# Patient Record
Sex: FEMALE | Race: BLACK OR AFRICAN AMERICAN | NOT HISPANIC OR LATINO | Employment: PART TIME | URBAN - METROPOLITAN AREA
[De-identification: names, ages, dates, MRNs, and addresses within clinical notes are randomized per-mention and may not be internally consistent; named-entity substitution may affect disease eponyms.]

---

## 2019-07-19 ENCOUNTER — TELEPHONE (OUTPATIENT)
Dept: TRANSPLANT | Facility: CLINIC | Age: 59
End: 2019-07-19

## 2019-07-22 ENCOUNTER — TELEPHONE (OUTPATIENT)
Dept: TRANSPLANT | Facility: CLINIC | Age: 59
End: 2019-07-22

## 2019-07-22 DIAGNOSIS — J84.9 ILD (INTERSTITIAL LUNG DISEASE): Primary | ICD-10-CM

## 2019-07-22 DIAGNOSIS — Z76.82 LUNG TRANSPLANT CANDIDATE: ICD-10-CM

## 2019-07-22 NOTE — TELEPHONE ENCOUNTER
----- Message from Byron Wolf MD sent at 7/22/2019  8:45 AM CDT -----  Consult  ----- Message -----  From: Nancy Walls RN  Sent: 7/19/2019  11:59 AM  To: Byron Wolf MD    Referral from Dr. Mewada at [facility].  58yo female with Lupus ILD, BMI 37.     PFT 11/2018:   FVC     1.56  FEV1    1.29  DLCO   14.38    6MWT 6/2019:  227m    Consult or defer due to weight?

## 2019-07-22 NOTE — TELEPHONE ENCOUNTER
Called patient to schedule the lung transplant consult appointments.  Received no answer but left a voice message asking her to call our office at her earliest convenience.

## 2019-07-23 NOTE — TELEPHONE ENCOUNTER
Returned call to patient but received no answer.  Left a message stating she can ask the  to put her through to me directly when she calls back so we can discuss her lung transplant consult appointment.

## 2019-07-23 NOTE — TELEPHONE ENCOUNTER
----- Message from Jonathan Bustillos sent at 7/23/2019 11:10 AM CDT -----  Contact: patient   Please call pt at [phone]    Patient would like to the status of her future lung appts    Thank you

## 2019-07-23 NOTE — TELEPHONE ENCOUNTER
Patient returned my call.  When asked, patient verbalized her awareness that Dr. Mewada referred her to [facility] for a lung transplant consult.  Explained that this initial visit with a transplant pulmonologist will be the time during which a provider determines if lung transplantation is an appropriate treatment option for her and gives her some education about the transplant process.  Added that PFTs and a 6 MWT will be done prior to the clinic visit with the provider.  Discussed available dates for these appointments and the patient asked to be scheduled on Tuesday, July 30, 2019 in the morning.  Informed patient that our  will call her back later today to update her about the exact times for her appointments on July 30.  Asked patient to call our office should she need to change these appointments.  She denied having any questions and verbalized her understanding of all information discussed.

## 2019-07-30 ENCOUNTER — HOSPITAL ENCOUNTER (OUTPATIENT)
Dept: PULMONOLOGY | Facility: CLINIC | Age: 59
Discharge: HOME | End: 2019-07-30
Payer: MEDICARE

## 2019-07-30 ENCOUNTER — INITIAL CONSULT (OUTPATIENT)
Dept: TRANSPLANT | Facility: CLINIC | Age: 59
End: 2019-07-30
Payer: MEDICARE

## 2019-07-30 VITALS
HEART RATE: 71 BPM | SYSTOLIC BLOOD PRESSURE: 136 MMHG | OXYGEN SATURATION: 98 % | DIASTOLIC BLOOD PRESSURE: 76 MMHG | HEIGHT: 69 IN | WEIGHT: 258.81 LBS | RESPIRATION RATE: 14 BRPM | BODY MASS INDEX: 38.33 KG/M2 | TEMPERATURE: 98 F

## 2019-07-30 VITALS — WEIGHT: 258.81 LBS | BODY MASS INDEX: 38.33 KG/M2 | HEIGHT: 69 IN

## 2019-07-30 DIAGNOSIS — Z76.82 LUNG TRANSPLANT CANDIDATE: ICD-10-CM

## 2019-07-30 DIAGNOSIS — J84.9 ILD (INTERSTITIAL LUNG DISEASE): ICD-10-CM

## 2019-07-30 DIAGNOSIS — M32.13 SYSTEMIC LUPUS ERYTHEMATOSUS WITH LUNG INVOLVEMENT, UNSPECIFIED SLE TYPE: ICD-10-CM

## 2019-07-30 DIAGNOSIS — Z76.82 LUNG TRANSPLANT CANDIDATE: Primary | ICD-10-CM

## 2019-07-30 DIAGNOSIS — E66.9 OBESITY (BMI 30-39.9): ICD-10-CM

## 2019-07-30 LAB
PRE FEV1 FVC: 78
PRE FEV1: 1.24
PRE FVC: 1.6
PREDICTED FEV1 FVC: 78
PREDICTED FEV1: 2.85
PREDICTED FVC: 3.6

## 2019-07-30 PROCEDURE — 99204 PR OFFICE/OUTPT VISIT, NEW, LEVL IV, 45-59 MIN: ICD-10-PCS | Mod: S$PBB,TXP,, | Performed by: INTERNAL MEDICINE

## 2019-07-30 PROCEDURE — 94618 PULMONARY STRESS TESTING: CPT | Mod: 26,S$PBB,TXP, | Performed by: INTERNAL MEDICINE

## 2019-07-30 PROCEDURE — 94010 BREATHING CAPACITY TEST: ICD-10-PCS | Mod: 26,S$PBB,TXP, | Performed by: INTERNAL MEDICINE

## 2019-07-30 PROCEDURE — 94010 BREATHING CAPACITY TEST: CPT | Mod: 26,S$PBB,TXP, | Performed by: INTERNAL MEDICINE

## 2019-07-30 PROCEDURE — 94727 PR PULM FUNCTION TEST BY GAS: ICD-10-PCS | Mod: 26,S$PBB,TXP, | Performed by: INTERNAL MEDICINE

## 2019-07-30 PROCEDURE — 94729 DIFFUSING CAPACITY: CPT | Mod: 26,S$PBB,TXP, | Performed by: INTERNAL MEDICINE

## 2019-07-30 PROCEDURE — 94010 BREATHING CAPACITY TEST: CPT | Mod: PBBFAC,TXP | Performed by: INTERNAL MEDICINE

## 2019-07-30 PROCEDURE — 94729 DIFFUSING CAPACITY: CPT | Mod: PBBFAC,TXP | Performed by: INTERNAL MEDICINE

## 2019-07-30 PROCEDURE — 94618 PULMONARY STRESS TESTING: ICD-10-PCS | Mod: 26,S$PBB,TXP, | Performed by: INTERNAL MEDICINE

## 2019-07-30 PROCEDURE — 99999 PR PBB SHADOW E&M-EST. PATIENT-LVL III: CPT | Mod: PBBFAC,TXP,, | Performed by: INTERNAL MEDICINE

## 2019-07-30 PROCEDURE — 99204 OFFICE O/P NEW MOD 45 MIN: CPT | Mod: S$PBB,TXP,, | Performed by: INTERNAL MEDICINE

## 2019-07-30 PROCEDURE — 94727 GAS DIL/WSHOT DETER LNG VOL: CPT | Mod: PBBFAC,TXP | Performed by: INTERNAL MEDICINE

## 2019-07-30 PROCEDURE — 99999 PR PBB SHADOW E&M-EST. PATIENT-LVL III: ICD-10-PCS | Mod: PBBFAC,TXP,, | Performed by: INTERNAL MEDICINE

## 2019-07-30 PROCEDURE — 94618 PULMONARY STRESS TESTING: CPT | Mod: PBBFAC,TXP | Performed by: INTERNAL MEDICINE

## 2019-07-30 PROCEDURE — 99213 OFFICE O/P EST LOW 20 MIN: CPT | Mod: PBBFAC,TXP,25 | Performed by: INTERNAL MEDICINE

## 2019-07-30 PROCEDURE — 94729 PR C02/MEMBANE DIFFUSE CAPACITY: ICD-10-PCS | Mod: 26,S$PBB,TXP, | Performed by: INTERNAL MEDICINE

## 2019-07-30 PROCEDURE — 94727 GAS DIL/WSHOT DETER LNG VOL: CPT | Mod: 26,S$PBB,TXP, | Performed by: INTERNAL MEDICINE

## 2019-07-30 RX ORDER — HYDROXYCHLOROQUINE SULFATE 200 MG/1
200 TABLET, FILM COATED ORAL 2 TIMES DAILY
COMMUNITY
Start: 2019-06-13

## 2019-07-30 RX ORDER — LISINOPRIL 20 MG/1
20 TABLET ORAL DAILY
COMMUNITY
End: 2023-07-07

## 2019-07-30 RX ORDER — MYCOPHENOLATE MOFETIL 500 MG/1
1500 TABLET ORAL 2 TIMES DAILY
COMMUNITY
Start: 2019-06-13 | End: 2023-07-07

## 2019-07-30 RX ORDER — GABAPENTIN 300 MG/1
600 CAPSULE ORAL 2 TIMES DAILY
COMMUNITY
Start: 2019-06-13

## 2019-07-30 RX ORDER — DICYCLOMINE HYDROCHLORIDE 20 MG/1
20 TABLET ORAL EVERY 6 HOURS PRN
COMMUNITY
Start: 2019-07-22 | End: 2019-08-21

## 2019-07-30 NOTE — PROCEDURES
Katy Vega is a 59 y.o.  female patient, who presents for a 6 minute walk test ordered by Braden Wolf MD.  The diagnosis is Pre Lung Transplant Evaluation; Interstitial Lung Disease.  The patient's BMI is 38.3 kg/m2.  Predicted distance (lower limit of normal) is 300.87 meters.      Test Results:    The test was completed without stopping.  The total time walked was 360 seconds.  During walking, the patient reported:  Lightheadedness, Dyspnea(back pain).  The patient used no assistive devices during testing.     07/30/2019---------Distance: 389.23 meters (1277 feet)     O2 Sat % Supplemental Oxygen Heart Rate Blood Pressure Tee Scale   Pre-exercise  (Resting) 99 % Room Air 81 bpm 144/67 mmHg 4   During Exercise 90 % Room Air 79 bpm 161/64 mmHg 7-8   Post-exercise  (Recovery) 98 % Room Air  68 bpm 150/67 mmHg      Recovery Time:  137 seconds    Performing nurse/tech:  John GUEVARA      PREVIOUS STUDY:   The patient has not had a previous study.      CLINICAL INTERPRETATION:  Six minute walk distance is 389.23 meters (1277 feet) with very heavy dyspnea.  During exercise, there was significant desaturation while breathing room air.  Both blood pressure and heart rate remained stable with walking.  The patient reported non-pulmonary symptoms during exercise.  No previous study performed.  Based upon age and body mass index, exercise capacity is normal.

## 2019-07-30 NOTE — PROGRESS NOTES
LUNG TRANSPLANT INITIAL EVALUATION                                                                                                                                           Reason for Visit:  Evaluation for lung transplant    Referring Physician: Mewada, Nishith M., MD    History of Present Illness: Katy Vega is a 59 y.o. female who is on 0L of oxygen.  She is on no assisted ventilation.  Her New York Heart Association Class is III and a Karnofsky score of 70% - Cares for self: Unable to carry on normal activity or active work. She is not diabetic.  She presents today for initial lung transplant evaluation for a diagnosis of ILD.    She states that she has noticed LEPE for years starting in 1602-7854.  At that time, she also noticed increasing fatigue with daily activities.  She was first evaluated at [facility] in 2006 when she evacuated after [place].  She states at that time she was diagnosed with COPD and ILD.  Further work-up showed positive autoimmune serologies and she was diagnosed with Lupus.  She was started on Plaquenil and Prednisone.  She then had intermittent follow-up until she moved back to [place] in 2012.  At that time, she established with Rheumatology at  and was again started on Plaquenil and Prednisone which she remained on until a few months ago.  She then began following with [facility] at [facility].  CT chest showed evidence of GGOs and fibrosis which have been stable on imaging.  They noticed a decrease in her FVC and referred her to [facility] Rheumatology who have since stopped the Prednisone, continued her on Plaquenil, and added MMF.  She has been unable to tolerate more than 1500mg per day due to GI side effects.  Since starting Prednisone years ago, she noticed an increased weight gain of approximately 80-90 lbs.  She has lost 15lbs since stopping.  Remains with LEPE but attempts to go to the gym and rides the stationary bike.  She does not require supplemental oxygen  and does not have evidence of PH.  She currently takes Symbicort and prn Albuterol for her COPD.  Does not have exacerbations or require outpatient steroids/abx.    Other medical history is significant for a ruptured aneurysm leading to a subdural hematoma and need for neurosurgical intervention.  Due to that, she feels like she has some difficulty with memory and balance.  She developed seizures following the CVA and takes Dilantin for seizure control.  She has not had a seizure in over 3 years.  She denies any known cardiac disease.  Has never had trauma to her chest, pneumothoraces, or chest surgeries.      She is currently on workers comp following a fall at work which led to a back injury requiring surgery and pain management.  She previously worked as a .  She is  with her  dying from lung cancer in .  Her 2 brothers and father also  of lung cancer and she has had 2 sisters die of breast cancer.  She is UTD on mammograms and had a right axillary LN biopsy last year which was negative.  She is a never smoker but has extensive second hand exposure.  She does not drink or use illicits.      Past Medical History:   Diagnosis Date    Aneurysm     Arthritis     COPD (chronic obstructive pulmonary disease)     Hypertension     Lupus     Raynaud disease     Seizures     Stroke 2012       Past Surgical History:   Procedure Laterality Date    NERVE SURGERY      PARTIAL HYSTERECTOMY         Allergies: Patient has no known allergies.    Current Outpatient Medications   Medication Sig    ALBUTEROL SULFATE (PROVENTIL HFA INHL) Inhale into the lungs.    amlodipine (NORVASC) 10 MG tablet Take 10 mg by mouth once daily.    budesonide-formoterol 160-4.5 mcg (SYMBICORT) 160-4.5 mcg/actuation HFAA Inhale 2 puffs into the lungs every 12 (twelve) hours.    dicyclomine (BENTYL) 20 mg tablet Take 20 mg by mouth every 6 (six) hours as needed.    duloxetine (CYMBALTA) 60 MG capsule  Take 60 mg by mouth every 7 days.     gabapentin (NEURONTIN) 300 MG capsule Take 600 mg by mouth 2 (two) times daily.    hydrocodone-acetaminophen 10-325mg (NORCO)  mg Tab Take by mouth.    hydroxychloroquine (PLAQUENIL) 200 mg tablet Take 200 mg by mouth 2 (two) times daily.    lisinopril (PRINIVIL,ZESTRIL) 20 MG tablet Take 20 mg by mouth once daily.    mometasone (NASONEX) 50 mcg/actuation nasal spray 2 sprays by Nasal route once daily.    mycophenolate (CELLCEPT) 500 mg Tab Take 1,500 mg by mouth 2 (two) times daily.    naproxen (NAPROSYN) 500 MG tablet Take 500 mg by mouth 2 (two) times daily.    phenytoin (DILANTIN) 100 MG ER capsule Take by mouth 3 (three) times daily.     No current facility-administered medications for this visit.          There is no immunization history on file for this patient.  Family History:    Family History   Problem Relation Age of Onset    Lupus Sister     Lung cancer Brother     Breast cancer Sister     Breast cancer Sister     Lung cancer Brother      Social History     Substance and Sexual Activity   Alcohol Use Yes    Comment: very occasional      Social History     Substance and Sexual Activity   Drug Use Never      Social History     Socioeconomic History    Marital status:      Spouse name: Not on file    Number of children: Not on file    Years of education: Not on file    Highest education level: Not on file   Occupational History    Not on file   Social Needs    Financial resource strain: Not on file    Food insecurity:     Worry: Not on file     Inability: Not on file    Transportation needs:     Medical: Not on file     Non-medical: Not on file   Tobacco Use    Smoking status: Never Smoker    Smokeless tobacco: Never Used   Substance and Sexual Activity    Alcohol use: Yes     Comment: very occasional    Drug use: Never    Sexual activity: Not on file   Lifestyle    Physical activity:     Days per week: Not on file     Minutes  per session: Not on file    Stress: Not on file   Relationships    Social connections:     Talks on phone: Not on file     Gets together: Not on file     Attends [employer] service: Not on file     Active member of club or organization: Not on file     Attends meetings of clubs or organizations: Not on file     Relationship status: Not on file   Other Topics Concern    Not on file   Social History Narrative    Not on file     Review of Systems   Constitutional: Positive for malaise/fatigue. Negative for chills, diaphoresis, fever and weight loss.   HENT: Negative for congestion, ear discharge, ear pain, hearing loss, nosebleeds, sinus pain, sore throat and tinnitus.    Eyes: Negative for blurred vision, double vision, photophobia, pain, discharge and redness.   Respiratory: Positive for shortness of breath. Negative for cough, hemoptysis, sputum production, wheezing and stridor.    Cardiovascular: Positive for leg swelling. Negative for chest pain, palpitations, orthopnea, claudication and PND.   Gastrointestinal: Negative for abdominal pain, blood in stool, constipation, diarrhea, heartburn, melena, nausea and vomiting.   Genitourinary: Negative for dysuria, flank pain, frequency, hematuria and urgency.   Musculoskeletal: Positive for back pain. Negative for falls, joint pain, myalgias and neck pain.   Skin: Negative for itching and rash.   Neurological: Positive for seizures. Negative for dizziness, tingling, tremors, sensory change, speech change, focal weakness, loss of consciousness, weakness and headaches.   Endo/Heme/Allergies: Negative for environmental allergies and polydipsia. Does not bruise/bleed easily.   Psychiatric/Behavioral: Positive for memory loss. Negative for depression, hallucinations, substance abuse and suicidal ideas. The patient is not nervous/anxious and does not have insomnia.      Vitals  /76 (BP Location: Right arm, Patient Position: Sitting, BP Method: Large (Automatic))    "Pulse 71   Temp 97.7 °F (36.5 °C) (Oral)   Resp 14   Ht 5' 9" (1.753 m)   Wt 117.4 kg (258 lb 13.1 oz)   LMP  (LMP Unknown)   SpO2 98% Comment: room air  BMI 38.22 kg/m²   Physical Exam   Constitutional: She is oriented to person, place, and time and well-developed, well-nourished, and in no distress. No distress.   HENT:   Head: Normocephalic and atraumatic.   Nose: Nose normal.   Mouth/Throat: Oropharynx is clear and moist. No oropharyngeal exudate.   Eyes: Pupils are equal, round, and reactive to light. Conjunctivae and EOM are normal. Right eye exhibits no discharge. Left eye exhibits no discharge. No scleral icterus.   Neck: Normal range of motion. Neck supple. No JVD present. No tracheal deviation present. No thyromegaly present.   Cardiovascular: Normal rate, regular rhythm, normal heart sounds and intact distal pulses. Exam reveals no gallop and no friction rub.   No murmur heard.  Pulmonary/Chest: Effort normal and breath sounds normal. No stridor. No respiratory distress. She has no wheezes. She has no rales. She exhibits no tenderness.   Abdominal: Bowel sounds are normal. She exhibits no distension. There is no tenderness. There is no guarding.   Musculoskeletal: Normal range of motion. She exhibits no edema, tenderness or deformity.   Lymphadenopathy:     She has no cervical adenopathy.   Neurological: She is oriented to person, place, and time. No cranial nerve deficit. Gait normal. Coordination normal. GCS score is 15.   Skin: Skin is dry. No rash noted. She is not diaphoretic. No erythema. No pallor.   Psychiatric: Mood and affect normal.       Labs:  Lab Visit on 07/30/2019   Component Date Value    Alcohol, Urine 07/30/2019 <10     Benzodiazepines 07/30/2019 Negative     Methadone metabolites 07/30/2019 Negative     Cocaine (Metab.) 07/30/2019 Negative     Opiate Scrn, Ur 07/30/2019 Negative     Barbiturate Screen, Ur 07/30/2019 Negative     Amphetamine Screen, Ur 07/30/2019 Negative  "    THC 07/30/2019 Negative     Phencyclidine 07/30/2019 Negative     Creatinine, Random Ur 07/30/2019 27.0     Toxicology Information 07/30/2019 SEE COMMENT        Pulmonary Function Tests 7/30/2019   FVC 1.6   FEV1 1.24   TLC (liters) 2.54   DLCO (ml/mmHg sec) 14.6   FVC% 45   FEV1% 44   TLC% 43   RV 1.03   RV% 46   DLCO% 71     6MW 7/30/2019   6MWT Status completed without stopping   Patient Reported Lightheadedness;Dyspnea   Was O2 used? No   6MW Distance walked (feet) 1277   Distance walked (meters) 389.23   Did patient stop? No   Oxygen Saturation 99   Supplemental Oxygen Room Air   Heart Rate 81   Blood Pressure 144/67   Tee Dyspnea Rating  somewhat heavy   Oxygen Saturation 90   Supplemental Oxygen Room Air   Heart Rate 79   Blood Pressure 161/64   Tee Dyspnea Rating  very heavy   Recovery Time (seconds) 137   Oxygen Saturation 98   Supplemental Oxygen Room Air   Heart Rate 68     Cardiodiagnostics:  TTE 3/26/2019 from [facility]: normal LVEF and RVEF.  ePAP normal.    Assessment:  1. Lung transplant candidate    2. ILD (interstitial lung disease)    3. Systemic lupus erythematosus with lung involvement, unspecified SLE type    4. Obesity (BMI 30-39.9)      Plan:   1. Currently followed by [facility] at [facility] for ILD and possible COPD.  No imaging available to review.  PFTs today show severe restriction with mild decrease in DLCO.  While radiology reports clearly document ILD from CT chest, her degree of restriction seems out of proportion to the decrease in DLCO suggesting that her obesity is playing a role.  Agree with referral to rheumatology and their current therapy with Plaquenil and MMF.  Trends in her FVC and DLCO will need to be followed closely at 6 month intervals and therapy escalated for continued decline.  Can continue with bronchodilators for reported history of COPD.  Strongly recommend referral to pulmonary rehab for strength and conditioning.      2. Currently followed by [facility] Rheumatology  for SLE.  Symptoms appear controlled.  Continue with Plaquenil and MMF.    3. Had a long discussion regarding her weight and importance of diet and exercise.  Recommend nutrition consult if available at [facility] and pulmonary rehab.  Patient very receptive and eager to start.      4. With regards to her lung transplant candidacy for a diagnosis of ILD, she currently does not meet disease specific indications for lung transplant but thank your for the early referral given the nature of her disease.  I had a long discussion with her about lung transplant and decisions about timing of work-up/listing.  Explained that ILD related to SLE can be very responsive to immunosuppression and progression can be slow with aggressive therapy.  Explained the importance of continued exercise for strength and conditioning.  I also suspect that she will have improvement in her symptoms with weight loss alone and she is very motivated to lose a significant amount of weight.  We discussed that should she need a transplant in the future, her current barrier is her obesity.  She will need to lose approximately 40lbs to drop her BMI below 34 before being considered.  She verbalized understanding and all questions were answered.      5. Follow-up in 1 year.    Thank you for the consult and allowing me to participate in the care of this patient.  Please feel free to contact me directly with any questions.        Rosa Maria Li D.O.  Lung Transplant  Pulmonary/Critical Care Medicine

## 2019-07-30 NOTE — LETTER
July 30, 2019        Nishith M. Mewada  [address]  [facility] [address]  Phone: [phone]  Fax: [phone]             [facility] - Lung Transplant  [address]  [facility] [address]  Phone: [phone]   Patient: Katy Vega   MR Number: [id number]   YOB: 1960   Date of Visit: 7/30/2019       Dear Dr. Nishith M. Mewada    Thank you for referring Katy Vega to me for evaluation. Attached you will find relevant portions of my assessment and plan of care.    If you have questions, please do not hesitate to call me. I look forward to following Katy Vega along with you.    Sincerely,    Rosa Maria Li, DO    Enclosure    If you would like to receive this communication electronically, please contact [email or web address] or [phone] to request [employer] access.    [employer] is a tool which provides read-only access to select patient information with whom you have a relationship. Its easy to use and provides real time access to review your patients record including encounter summaries, notes, results, and demographic information.    If you feel you have received this communication in error or would no longer like to receive these types of communications, please e-mail [email or web address]

## 2020-04-23 ENCOUNTER — OFFICE VISIT (OUTPATIENT)
Dept: URGENT CARE | Facility: CLINIC | Age: 60
End: 2020-04-23
Payer: MEDICARE

## 2020-04-23 VITALS
WEIGHT: 258 LBS | BODY MASS INDEX: 38.21 KG/M2 | HEART RATE: 68 BPM | TEMPERATURE: 97 F | OXYGEN SATURATION: 96 % | HEIGHT: 69 IN | RESPIRATION RATE: 16 BRPM

## 2020-04-23 DIAGNOSIS — M54.30 BACK PAIN WITH SCIATICA: ICD-10-CM

## 2020-04-23 DIAGNOSIS — M54.9 BACK PAIN, UNSPECIFIED BACK LOCATION, UNSPECIFIED BACK PAIN LATERALITY, UNSPECIFIED CHRONICITY: Primary | ICD-10-CM

## 2020-04-23 DIAGNOSIS — M54.9 BACK PAIN WITH SCIATICA: ICD-10-CM

## 2020-04-23 PROCEDURE — 72100 X-RAY EXAM L-S SPINE 2/3 VWS: CPT | Mod: FY,S$GLB,TXP, | Performed by: RADIOLOGY

## 2020-04-23 PROCEDURE — 72100 XR LUMBAR SPINE 2 OR 3 VIEWS: ICD-10-PCS | Mod: FY,S$GLB,TXP, | Performed by: RADIOLOGY

## 2020-04-23 PROCEDURE — 96372 THER/PROPH/DIAG INJ SC/IM: CPT | Mod: S$GLB,TXP,, | Performed by: INTERNAL MEDICINE

## 2020-04-23 PROCEDURE — 99214 OFFICE O/P EST MOD 30 MIN: CPT | Mod: S$GLB,TXP,, | Performed by: INTERNAL MEDICINE

## 2020-04-23 PROCEDURE — 96372 PR INJECTION,THERAP/PROPH/DIAG2ST, IM OR SUBCUT: ICD-10-PCS | Mod: S$GLB,TXP,, | Performed by: INTERNAL MEDICINE

## 2020-04-23 PROCEDURE — 99214 PR OFFICE/OUTPT VISIT, EST, LEVL IV, 30-39 MIN: ICD-10-PCS | Mod: S$GLB,TXP,, | Performed by: INTERNAL MEDICINE

## 2020-04-23 RX ORDER — KETOROLAC TROMETHAMINE 30 MG/ML
30 INJECTION, SOLUTION INTRAMUSCULAR; INTRAVENOUS
Status: DISCONTINUED | OUTPATIENT
Start: 2020-04-23 | End: 2020-04-23

## 2020-04-23 RX ORDER — KETOROLAC TROMETHAMINE 30 MG/ML
30 INJECTION, SOLUTION INTRAMUSCULAR; INTRAVENOUS
Status: COMPLETED | OUTPATIENT
Start: 2020-04-23 | End: 2020-04-23

## 2020-04-23 RX ADMIN — KETOROLAC TROMETHAMINE 30 MG: 30 INJECTION, SOLUTION INTRAMUSCULAR; INTRAVENOUS at 07:04

## 2020-04-23 NOTE — PATIENT INSTRUCTIONS
Sciatica    Sciatica is a condition that causes pain in the lower back that spreads down into the buttock, hip, and leg. Sometimes the leg pain can happen without any back pain. Sciatica happens when a spinal nerve is irritated or has pressure put on it as comes out of the spinal canal in the lower back. This most often happens when a bulge or rupture of a nearby spinal disk presses on the nerve. Sciatica can also be caused by a narrowing of the spinal canal (spinal stenosis) or spasm of the muscle in the buttocks that the sciatic nerve passes through (pyriform muscle). Sciatica is also called lumbar radiculopathy.  Sciatica may begin after a sudden twisting or bending force, such as in a car accident. Or it can happen after a simple awkward movement. In either case, muscle spasm often also happens. Muscle spasm makes the pain worse.  A healthcare provider makes a diagnosis of sciatica from your symptoms and a physical exam. Unless you had an injury from a car accident or fall, you usually wont have X-rays taken at this time. This is because the nerves and disks in your back cant be seen on an X-ray. If the provider sees signs of a compressed nerve, you will need to schedule an MRI scan as an outpatient. Signs of a compressed nerve include loss of strength in a leg.  Most sciatica gets better with medicine, exercise, and physical therapy. If your symptoms continue after at least 3 months of medical treatment, you may need surgery or injections to your lower back.  Home care  Follow these tips when caring for yourself at home:  · You may need to stay in bed the first few days. But as soon as possible, begin sitting up or walking. This will help you avoid problems that come from staying in bed for long periods.  · When in bed, try to find a position that is comfortable. A firm mattress is best. Try lying flat on your back with pillows under your knees. You can also try lying on your side with your knees bent up  toward your chest and a pillow between your knees.  · Avoid sitting for long periods. This puts more stress on your lower back than standing or walking.  · Use heat from a hot shower, hot bath, or heating pad to help ease pain. Massage can also help. You can also try using an ice pack. You can make your own ice pack by putting ice cubes in a plastic bag. Wrap the bag in a thin towel. Try both heat and cold to see which works best. Use the method that feels best for 20 minutes several times a day.  · You may use acetaminophen to ease pain, unless another pain medicine was prescribed. Note: If you have chronic liver or kidney disease, talk with your healthcare provider before taking these medicines. Also talk with your provider if youve had a stomach ulcer or gastrointestinal bleeding.  · Use safe lifting methods. Dont lift anything heavier than 15 pounds until all of the pain is gone.  Follow-up care  Follow up with your healthcare provider, or as advised. You may need physical therapy or additional tests.  If X-rays were taken, a radiologist will look at them. You will be told of any new findings that may affect your care.  When to seek medical advice  Call your healthcare provider right away if any of these occur:  · Pain gets worse even after taking prescribed medicine  · Weakness or numbness in 1 or both legs or hips  · Numbness in your groin or genital area  · You cant control your bowel or bladder  · Fever  · Redness or swelling over your back or spine   Date Last Reviewed: 8/1/2016  © 4764-7601 [employer]. [address] [place] [address], [place] [address]. All rights reserved. This information is not intended as a substitute for professional medical care. Always follow your healthcare professional's instructions.        Understanding Lumbar Radiculopathy    Lumbar radiculopathy is irritation or inflammation of a nerve root in the low back. It causes symptoms that spread out from the back down one or  both legs. To understand this condition, it helps to understand the parts of the spine:  · Vertebrae. These are bones that stack to form the spine. The lumbar spine contains the 5 bottom vertebrae.  · Disks. These are soft pads of tissue between the vertebrae. They act as shock absorbers for the spine.  · Spinal canal. This is a tunnel formed within the stacked vertebrae. In the lumbar spine, nerves run through this canal.  · Nerves. These branch off and leave the spinal canal, traveling out to parts of the body. As they leave the spinal canal, nerves pass through openings between the vertebrae. The nerve root is the part of the nerve that is closest to the spinal canal.  · Sciatic nerve. This is a large nerve formed from several nerve roots in the low back. This nerve extends down the back of the leg to the foot.  With lumbar radiculopathy, nerve roots in the low back become irritated. This leads to pain and symptoms. The sciatic nerve is commonly involved, so the condition is often called sciatica.  What causes lumbar radiculopathy?  Aging, injury, poor posture, extra body weight, and other issues can lead to problems in the low back. These problems may then irritate nerve roots. They include:  · Damage to a disk in the lumbar spine. The damaged disk may then press on nearby nerve roots.  · Degeneration from wear and tear, and aging. This can lead to narrowing (stenosis) of the openings between the vertebrae. The narrowed openings press on nerve roots as they leave the spinal canal.  · Unstable spine. This is when a vertebra slips forward. It can then press on a nerve root.  Other, less common things can put pressure on nerves in the low back. These include diabetes, infection, or a tumor.  Symptoms of lumbar radiculopathy  These include:  · Pain in the low back  · Pain, numbness, tingling, or weakness that travels into the buttocks, hip, groin, or leg  · Muscle spasms  Treatment for lumbar radiculopathy  In most  cases, your healthcare provider will first try treatments that help relieve symptoms. These may include:  · Prescription and over-the-counter pain medicines. These help relieve pain, swelling, and irritation.  · Limits on positions and activities that increase pain. But lying in bed or avoiding all movement is only recommended for a short period of time.  · Physical therapy, including exercises and stretches. This helps decrease pain and increase movement and function.  · Steroid shots into the lower back. This may help relieve symptoms for a time.  · Weight-loss program. If you are overweight, losing extra pounds may help relieve symptoms.  In some cases, you may need surgery to fix the underlying problem. This depends on the cause, the symptoms, and how long the pain has lasted.  Possible complications  Over time, an irritated and inflamed nerve may become damaged. This may lead to long-lasting (permanent) numbness or weakness in your legs and feet. If symptoms change suddenly or get worse, be sure to let your healthcare provider know.  When to call your healthcare provider  Call your healthcare provider right away if you have any of these:  · New pain or pain that gets worse  · New or increasing weakness, tingling, or numbness in your leg or foot  · Problems controlling your bladder or bowel   Date Last Reviewed: 3/10/2016  © 8022-6423 [employer]. [address] [place] [address], [place] [address]. All rights reserved. This information is not intended as a substitute for professional medical care. Always follow your healthcare professional's instructions.

## 2020-04-23 NOTE — PROGRESS NOTES
"Subjective:       Patient ID: Katy Vega is a 60 y.o. female.    Vitals:  height is 5' 9" (1.753 m) and weight is 117 kg (258 lb). Her tympanic temperature is 97.2 °F (36.2 °C). Her pulse is 68. Her respiration is 16 and oxygen saturation is 96%.     Chief Complaint: Leg Pain    Leg Pain    The incident occurred more than 1 week ago (x1 week). The incident occurred at home. There was no injury mechanism. The pain is present in the right leg. The quality of the pain is described as aching (weakness). The pain is at a severity of 9/10. The pain is moderate. The pain has been constant since onset. Associated symptoms include muscle weakness. She reports no foreign bodies present. The symptoms are aggravated by movement. Treatments tried: Rx pain meds on list. The treatment provided no relief.       Constitution: Negative for chills, fatigue and fever.   HENT: Negative for congestion and sore throat.    Neck: Negative for painful lymph nodes.   Cardiovascular: Negative for chest pain and leg swelling.   Eyes: Negative for double vision and blurred vision.   Respiratory: Negative for cough and shortness of breath.    Gastrointestinal: Negative for nausea, vomiting and diarrhea.   Genitourinary: Negative for dysuria, frequency, urgency and history of kidney stones.   Musculoskeletal: Positive for pain. Negative for joint pain, joint swelling, muscle cramps and muscle ache.        R LEG PAIN/WEAKNESS   Skin: Negative for color change, pale, rash and bruising.   Allergic/Immunologic: Negative for seasonal allergies.   Neurological: Negative for dizziness, history of vertigo, light-headedness, passing out and headaches.   Hematologic/Lymphatic: Negative for swollen lymph nodes.   Psychiatric/Behavioral: Negative for nervous/anxious, sleep disturbance and depression. The patient is not nervous/anxious.        Objective:      Physical Exam   Constitutional: She is oriented to person, place, and time. She appears " well-developed and well-nourished.   HENT:   Head: Normocephalic and atraumatic.   Eyes: Pupils are equal, round, and reactive to light. EOM are normal.   Cardiovascular: Normal rate and regular rhythm.   Pulmonary/Chest: Effort normal and breath sounds normal. No respiratory distress.   Musculoskeletal: She exhibits no edema.   Calf circumference 17cm bilaterally. No edema.    Neurological: She is alert and oriented to person, place, and time. She exhibits normal muscle tone. Coordination normal.   Sensation intact in bilateral lower extremities. Dorsiflexion of ankle intact bilaterally. Inversion and eversion of ankle normal. Gait with minor drop of right foot. Straight leg test of right reproduced pain in buttock with shooting pain down calf.    Skin: Capillary refill takes less than 2 seconds.         Assessment:       1. Back pain, unspecified back location, unspecified back pain laterality, unspecified chronicity    2. Back pain with sciatica        Plan:         Back pain, unspecified back location, unspecified back pain laterality, unspecified chronicity  -     X-Ray Lumbar Spine 2 Or 3 Views; Future; Expected date: 04/23/2020    Back pain with sciatica    Exam consistent with Lumbago with sciatica. Considered DVT in my differential given calf pain with active cancer, but leg circumference 17 cm bilaterally with no edema. Also no SOB or chest pain. Straight leg test positive. Slight foot drop noted with observing gait. Remainder of neuro exam reassuring. X ray with degenerative changes but no herniation. Conservative management with Toradol injection and continued use of gabapentin. Will message PCP to inform them of this visit and to take over mgmt including possible neurosurgery referral.

## 2020-04-24 ENCOUNTER — TELEPHONE (OUTPATIENT)
Dept: URGENT CARE | Facility: CLINIC | Age: 60
End: 2020-04-24

## 2020-04-24 NOTE — TELEPHONE ENCOUNTER
"Attempted to call and check in with patient. Phone rang once then stated "person unavailable". Unable to leave voicemail.   "

## 2020-04-30 ENCOUNTER — LAB VISIT (OUTPATIENT)
Dept: PRIMARY CARE CLINIC | Facility: CLINIC | Age: 60
End: 2020-04-30
Payer: MEDICARE

## 2020-04-30 DIAGNOSIS — R05.9 COUGH: Primary | ICD-10-CM

## 2020-04-30 PROCEDURE — U0002 COVID-19 LAB TEST NON-CDC: HCPCS | Mod: TXP

## 2020-05-01 ENCOUNTER — TELEPHONE (OUTPATIENT)
Dept: INTERNAL MEDICINE | Facility: CLINIC | Age: 60
End: 2020-05-01

## 2020-05-01 LAB — SARS-COV-2 RNA RESP QL NAA+PROBE: NOT DETECTED

## 2020-07-16 ENCOUNTER — TELEPHONE (OUTPATIENT)
Dept: TRANSPLANT | Facility: CLINIC | Age: 60
End: 2020-07-16

## 2020-07-16 DIAGNOSIS — Z01.812 PRE-PROCEDURE LAB EXAM: ICD-10-CM

## 2020-07-16 DIAGNOSIS — Z76.82 LUNG TRANSPLANT CANDIDATE: Primary | ICD-10-CM

## 2020-07-16 DIAGNOSIS — M32.13 SYSTEMIC LUPUS ERYTHEMATOSUS WITH LUNG INVOLVEMENT, UNSPECIFIED SLE TYPE: ICD-10-CM

## 2020-07-16 DIAGNOSIS — J84.9 ILD (INTERSTITIAL LUNG DISEASE): ICD-10-CM

## 2020-07-23 NOTE — TELEPHONE ENCOUNTER
Spoke with patient, verified she is undergoing treatment for breast cancer (will complete radiation post lumpectomy about 8/25). Patient is requesting to continue follow up with Dr. Li for pulmonary care. She is requesting to know how long will she need to wait to be considered for lung transplant. Patient is aware time varies depending on type of cancer and staging. Patient is requesting to be scheduled with Dr. Li once she completes radiation; she is requesting COVID testing at [facility]. Patient is requesting referral to bariatrics, she reports her weight is a problem and she wants to know if there is someone she can see. She is aware will ask Dr. Li if this is appropriate per transplant team.

## 2020-07-24 ENCOUNTER — TELEPHONE (OUTPATIENT)
Dept: TRANSPLANT | Facility: CLINIC | Age: 60
End: 2020-07-24

## 2020-07-24 DIAGNOSIS — E66.9 OBESITY (BMI 30-39.9): Primary | ICD-10-CM

## 2020-07-24 DIAGNOSIS — M32.13 SYSTEMIC LUPUS ERYTHEMATOSUS WITH LUNG INVOLVEMENT, UNSPECIFIED SLE TYPE: ICD-10-CM

## 2020-07-24 DIAGNOSIS — Z76.82 LUNG TRANSPLANT CANDIDATE: ICD-10-CM

## 2020-07-24 NOTE — TELEPHONE ENCOUNTER
----- Message from Rosa Maria Li DO sent at 7/24/2020  1:45 PM CDT -----  Regarding: RE: bariatric consult?  We can refer to bariatric medicine no problem.  Jbks  ----- Message -----  From: Bev Cedeño  Sent: 7/23/2020   3:45 PM CDT  To: Rosa Maria Li,   Subject: bariatric consult?                               This patient is requesting to continue follow up with you (as primary pulm) while she is unable to qualify for lung transplant. (Due to breast cancer / radiation - she is aware she will require oncology clearance and there will be an exclusion period).  She states she knows her weight is an issue as well, and that she would like to have a bariatrics consult if possible. Would you refer her or should I advise her to request this from another provider?     Thank you.

## 2020-07-30 ENCOUNTER — TELEPHONE (OUTPATIENT)
Dept: TRANSPLANT | Facility: CLINIC | Age: 60
End: 2020-07-30

## 2020-07-30 NOTE — TELEPHONE ENCOUNTER
Contacted patient about obtaining outside imaging from 2018- present (CT chest scans). She reports she will come by to sign a release of information next week when she comes to bariatric appt. Will leave at reception desk of transplant clinic. Patient verbalized understanding.

## 2020-08-07 ENCOUNTER — TELEPHONE (OUTPATIENT)
Dept: BARIATRICS | Facility: CLINIC | Age: 60
End: 2020-08-07

## 2020-08-07 ENCOUNTER — INITIAL CONSULT (OUTPATIENT)
Dept: BARIATRICS | Facility: CLINIC | Age: 60
End: 2020-08-07
Payer: MEDICARE

## 2020-08-07 VITALS
SYSTOLIC BLOOD PRESSURE: 122 MMHG | HEIGHT: 70 IN | HEART RATE: 85 BPM | DIASTOLIC BLOOD PRESSURE: 89 MMHG | OXYGEN SATURATION: 98 % | BODY MASS INDEX: 36.34 KG/M2 | WEIGHT: 253.81 LBS

## 2020-08-07 DIAGNOSIS — I10 HYPERTENSION, UNSPECIFIED TYPE: ICD-10-CM

## 2020-08-07 DIAGNOSIS — M32.13 SYSTEMIC LUPUS ERYTHEMATOSUS WITH LUNG INVOLVEMENT, UNSPECIFIED SLE TYPE: ICD-10-CM

## 2020-08-07 DIAGNOSIS — E66.01 CLASS 2 SEVERE OBESITY DUE TO EXCESS CALORIES WITH SERIOUS COMORBIDITY AND BODY MASS INDEX (BMI) OF 36.0 TO 36.9 IN ADULT: Primary | ICD-10-CM

## 2020-08-07 PROBLEM — E66.812 CLASS 2 SEVERE OBESITY DUE TO EXCESS CALORIES WITH SERIOUS COMORBIDITY AND BODY MASS INDEX (BMI) OF 36.0 TO 36.9 IN ADULT: Status: ACTIVE | Noted: 2020-08-07

## 2020-08-07 PROCEDURE — 99215 PR OFFICE/OUTPT VISIT, EST, LEVL V, 40-54 MIN: ICD-10-PCS | Mod: S$PBB,TXP,, | Performed by: STUDENT IN AN ORGANIZED HEALTH CARE EDUCATION/TRAINING PROGRAM

## 2020-08-07 PROCEDURE — 99999 PR PBB SHADOW E&M-EST. PATIENT-LVL V: ICD-10-PCS | Mod: PBBFAC,TXP,, | Performed by: STUDENT IN AN ORGANIZED HEALTH CARE EDUCATION/TRAINING PROGRAM

## 2020-08-07 PROCEDURE — 99215 OFFICE O/P EST HI 40 MIN: CPT | Mod: PBBFAC,NTX | Performed by: STUDENT IN AN ORGANIZED HEALTH CARE EDUCATION/TRAINING PROGRAM

## 2020-08-07 PROCEDURE — 99999 PR PBB SHADOW E&M-EST. PATIENT-LVL V: CPT | Mod: PBBFAC,TXP,, | Performed by: STUDENT IN AN ORGANIZED HEALTH CARE EDUCATION/TRAINING PROGRAM

## 2020-08-07 PROCEDURE — 99215 OFFICE O/P EST HI 40 MIN: CPT | Mod: S$PBB,TXP,, | Performed by: STUDENT IN AN ORGANIZED HEALTH CARE EDUCATION/TRAINING PROGRAM

## 2020-08-07 RX ORDER — SEMAGLUTIDE 1.34 MG/ML
0.25 INJECTION, SOLUTION SUBCUTANEOUS
Qty: 1.5 ML | Refills: 0 | Status: SHIPPED | OUTPATIENT
Start: 2020-08-07 | End: 2020-08-29

## 2020-08-07 NOTE — PATIENT INSTRUCTIONS
Ozempic 0.25 mg weekly injection.  Decrease portions as soon as you start Ozempic. Some nausea in the first 2 weeks is not unusual.   If you get pain across the upper abdomen and around to your back, please call the office.     Www.[email or web address] for savings card.    Obesity is a chronic disease that requires lifelong management.  While there are treatments for obesity, such as surgery, medication, and lifestyle changes, there is no cure for obesity.        Benefits seen with 5-10% weight loss:  - Reduction in risk of type 2 diabetes  - Reduction in cardiovascular mortality risk  - Improvements in cholesterol levels  - Improvements in blood pressure  - Improvements in severity of sleep apnea  - Improvemnts in quality of life      Weekly Weight:  Weigh yourself at least once a week to help keep track of your progress between visits. Tracking your weight will provide you with important feedback about the changes you are making. To measure your weight as accurately as possible, weigh yourself at the same time of day, wearing the same clothes, and using the same scale.       Food and Beverage Log:  Keep a log of all food and beverages that you consume.  Keeping track of calories will help you lose weight, because monitoring will help you become more aware of what you are eating and recognize your eating patterns.  Be mindful of your hunger level before eating and your satiety level after eating.  Just keeping records will help you make healthy changes in your diet and eat fewer calories.    Tips for keeping a calorie count:     ? Each day, aim for the daily calorie goal of 1200 calories.     ? Record your food intake immediately after eating. If possible, look up calories before or immediately after eating.     ? Download an justin like [employer] or Lose It! To keep track of your calories.    ? Measuring foods (using measuring cups or spoons) will help you be more accurate with counting calories.     ? Keep a running  calorie count throughout the day.     ? Count daily calories toward a weekly calorie total. If you eat too many calories one day, cut back slightly over the next few days to meet your weekly goal.      Meal Planning & Grocery Shopping    Meal planning builds the foundation for healthy eating. When you have structured ideas for healthy meals and foods available at home to prepare those meals, weight control becomes easier.  If only healthy foods are available at home, then you will be much more likely to eat healthy foods. And you will be less likely to go to a restaurant or  a fast food meal, which tend to be unhealthy and higher in calories than meals prepared at home.      Take 5-10 minutes each week to plan meals for the next 7 days.  Make a grocery list based on the meal plan.    Grocery Shopping Tips:  ? Shop on a full stomach.  ? Schedule your shopping for times when you are most motivated and able to be disciplined about your purchases. For example, after a stressful day at work it may be difficult to make the healthiest choices. Shopping at other times, such as early in the morning or after dinner, may be easier.  ? Focus your shopping on the outside aisles of the store, which tend to contain more fresh foods and lower calorie foods. The inside aisles tend to have more processed foods.  ? Stick to your list. Avoid buying unhealthy items just because they are on sale.   ? Compare nutrition labels to check the number of calories and percentage of fat.      What to buy:    Vegetables  - Fresh vegetables  - Frozen vegetables with no sauce or added salt  - Canned vegetables with no sauce or added salt    Protein  - Lean meats, such as chicken and turkey  - Limit red meats, such as beef to no more than 1x/week  - Limit processed meats, such as cold cuts, zavala, sausage, and hot dogs. Look for brands that have no nitrites and are minimally processed. Consider turkey sausage or turkey zavala.  - Fish and  Shellfish  - Eggs  - Dried beans  - Canned beans (reduced sodium)    Fat  - Use healthy oils, such as olive oil or canola oil, for cooking, salad dressings, etc.  - Unflavored nuts and seeds  - Nut butters (no added sugar)    Dairy  - Yogurt (no sugar added)  - Cheese  - Low-fat milk  - Unsweetened nondairy milks (almond milk, soy milk, etc)    Fruit  - Fresh Fruit  - Frozen fruit with no added sugar  - Canned fruit with no added sugar  - Dried fruit with no added sugar  - 100% fruit juice    Whole Grains  - Single ingredient grains, such as oats, quinoa, brown rice  - Whole-wheat pasta  - Sprouted whole-grain bread    What to avoid:  - Avoid fried foods  - Avoid foods with added sugar  - Avoid sugar-sweetened beverages  - Avoid ultra-processed foods          Copyright © 2011, [facility]. For more information about The Healthy Eating Plate, please see The Nutrition Source, Department of Nutrition, [employer], www.thenutritionsource.org, and [employer], www.health.[employer] [place].edu.      SEATED RESISTANCE BAND EXERCISES    If you do not have a resistance band, or do not feel comfortable using a resistance band, these exercises can also be done holding a light hand weight or water bottle.  If you are just starting to exercise, you may want to go through the motions without any weights or resistance till you become comfortable with the movements.    Do each of the movements shown 10 times (10 repetitions). You can repeat the exercises a second or  third time as well for greater benefit. The amount of tension on the resistance bands should be adjusted so  you can complete one set of 10 repetitions with effort. Increase the tension every few weeks. Do these  exercises 2 or 3 times a week.    * If an exercise hurts your back or joints, stop doing that particular exercise, but keep doing all the others *      CHEST EXERCISE        Start Position:   Sit tall, with feet  shoulder width apart and feet in front of knees.   Belly pulled in.   Place the band around your upper back, grasp band in each hand, knuckles (rings) facing front. Arms  should be bent so that knuckles are in front of elbows   Slide shoulder blades down your back and slightly together (as if making a V).   Relax your neck.    To Perform This Exercise:   Press hands forward to lengthen arms using chest muscles (not arms!).   Dont arch your back!)   Return to start position and repeat 10 times.   Breathe!        BACK EXERCISE        Start Position:   Sit tall, with feet shoulder width apart and feet in front of knees.   Belly pulled in.   Grasp band in each hand and raise overhead. Arms should be slightly wider than shoulder width and no  slack in the band.   Slide shoulder blades down your back and slightly together (as if making a V).   Relax your neck.    To Perform This Exercise:   Open arms pulling down towards chest using upper back muscles (not arms!).   Squeeze through shoulder blades at the bottom of the movement (dont arch your back!).   Return to start position and repeat 10 times.   Breathe!        SHOULDER EXERCISE        Start Position:   Sit tall, with feet shoulder width apart and feet in front of knees.   Belly pulled in.   Sit on band so that you can grasp band in one hand with tension on the band, but not so much tension that  you cannot straighten the arm. It may take a few tries to find the right amount of tension.   Slide shoulder blades down your back and slightly together (as if making a V).   Relax your neck.    To Perform This Exercise:   Press fist to the ceiling, slightly in front of the body.   SLOWLY return to start position and repeat 10 times.   Switch sides and repeat on the other side.   Breathe!        TRICEPS EXERCISE        Start Position:   Sit tall, with feet shoulder width apart and feet in front of knees.   Belly pulled in.   Sit on one end  of the band. Grasp other end of band in one hand.   Point elbow directly toward the ceiling (if this is difficult, you may support the upper arm with the opposite  hand)   Be sure there is no slack in the band in the starting position.   Slide shoulder blades down your back and slightly together (as if making a V).   Relax your neck.    To Perform This Exercise:   Extend your arm up to the ceiling, as shown.   Squeeze through shoulder blades at the bottom of the movement (dont arch your back!).   SLOWLY return to start position and repeat 10 times.   Repeat on the other side.   Breathe!        BICEP EXERCISE        Start Position:   Sit tall, with feet shoulder width apart and feet in front of knees.   Belly pulled in.   Place center of exercise band under one foot and step the end of the band under the other foot.   Grasp each end of the band with one hand. Make sure there is no slack between your foot and the hand  that holds the band.   Hold your elbows to your sides.   Pull abdominals in, lift chest, press shoulders down and back.    To Perform This Exercise:   As you curl up, keep your wrist from changing position in relation to your forearm and your arm stable  from the shoulder to the elbow.   Bend and straighten your elbow in a slow and controlled movement. Repeat this motion 10 times.   Repeat on the other side.   Breathe!

## 2020-08-07 NOTE — PROGRESS NOTES
Subjective:       Patient ID: Katy Vega is a 60 y.o. female.    Chief Complaint: weight gain    Patient presents for treatment of obesity.     Retired three years ago, and weight has increased since.    Co-morbidities:   HTN  ILD  COPD  Lupus  Seizure  Stroke    Weight History  Lowest adult weight: 180 lbs  Highest adult weight: 253.9 lbs     History of Weight Loss Efforts  No prior medication use for weight loss    Current Physical Activity  Walks about a block, limited due to equilibrium    Current Eating Habits  Breakfast - tea or coffee; cereal and milk; omelet; eggs, grits, and sausage; pancakes  Lunch - salad, tuna, hot dogs  Dinner - shrimp, jamabalaya  Snacks - cookies  Beverages - water, juice, flavored tea, lemonade    Alcohol: crown or wine about once a week    Tobacco: no    Sleep: 3-4 hours/night        Review of Systems   Constitutional: Negative for chills and fever.   HENT: Negative for sore throat and trouble swallowing.    Eyes: Negative for visual disturbance.        Last eye exam about 1 year ago; negative for glaucoma   Respiratory: Positive for shortness of breath. Negative for cough.    Cardiovascular: Negative for chest pain, palpitations and leg swelling.   Gastrointestinal: Negative for abdominal pain, constipation, diarrhea, nausea, vomiting and reflux.   Endocrine:        Negative for thyroid disease/cancer   Genitourinary: Negative for difficulty urinating.        Negative for kidney stones   Musculoskeletal: Positive for arthralgias and back pain.   Integumentary:  Negative for rash.   Allergic/Immunologic: Negative for immunocompromised state.   Neurological: Positive for seizures. Negative for dizziness, light-headedness and headaches.   Hematological: Does not bruise/bleed easily.   Psychiatric/Behavioral: Positive for sleep disturbance. The patient is not nervous/anxious.          Objective:        Ref. Range 6/8/2020 09:30   Cholesterol Latest Ref Range: <200 mg/dL 136  ((NONE))   HDL Latest Ref Range: 40 - 59 mg/dL 41 ((NONE))   Hdl/Cholesterol Ratio Latest Ref Range: 0.00 - 4.40  3.32 ((NONE))   LDL Calculated Latest Ref Range: <130 mg/dL 83 ((NONE))   Non-HDL Cholesterol Latest Ref Range: <160  95 ((NONE))   Triglycerides Latest Ref Range: <150 mg/dL 58 ((NONE))   Hemoglobin A1C External Latest Ref Range: 4.7 - 5.6 % 5.1 ((NONE))   Estimated Avg Glucose Latest Ref Range: <115 mg/dL 100 ((NONE))       Weight: 253.9 lbs  BMI 36.94  BFP 48%  SMM 73.6  BMR 1665 kcal    Vitals:    08/07/20 0902   BP: 122/89   Pulse: 85         Physical Exam  Vitals signs reviewed.   Constitutional:       General: She is not in acute distress.     Appearance: Normal appearance. She is obese. She is not ill-appearing, toxic-appearing or diaphoretic.   HENT:      Head: Normocephalic and atraumatic.   Eyes:      Extraocular Movements: Extraocular movements intact.   Neck:      Musculoskeletal: Normal range of motion and neck supple.   Cardiovascular:      Rate and Rhythm: Normal rate and regular rhythm.   Pulmonary:      Effort: Pulmonary effort is normal. No respiratory distress.      Breath sounds: Normal breath sounds.   Abdominal:      Palpations: Abdomen is soft.      Tenderness: There is no abdominal tenderness.   Musculoskeletal: Normal range of motion.      Right lower leg: No edema.      Left lower leg: No edema.   Skin:     General: Skin is warm and dry.   Neurological:      General: No focal deficit present.      Mental Status: She is alert and oriented to person, place, and time.      Gait: Gait normal.   Psychiatric:         Mood and Affect: Mood normal.         Behavior: Behavior normal.         Thought Content: Thought content normal.         Judgment: Judgment normal.         Assessment:       1. Class 2 severe obesity due to excess calories with serious comorbidity and body mass index (BMI) of 36.0 to 36.9 in adult    2. Systemic lupus erythematosus with lung involvement, unspecified SLE  type    3. Hypertension, unspecified type        Plan:   - Ozempic 0.25 mg weekly injection    - Log all food and beverage intake with a daily calorie goal of 1200 calories    - Given healthy eating plate, grocery shopping tips and meal plans in AVS    - Physical Activity: Given seated resistance band exercises to do 3x/week    - Return to clinic in 4 weeks

## 2020-08-07 NOTE — TELEPHONE ENCOUNTER
Called patient and advised her that Ozempic comes with needles, and a separate prescription is not necessary. Patient understands.

## 2020-08-07 NOTE — TELEPHONE ENCOUNTER
----- Message from Pipo Schmitz sent at 8/7/2020 10:49 AM CDT -----  Contact: pt @ [phone]  Pt asking the doctor send needle script, pt states it was not sent with the semaglutide (OZEMPIC) 0.25 mg or 0.5 mg(2 mg/1.5 mL) PnIj script and the box of needles are $45     [>50% of Time Spent on Counseling for ____] : Greater than 50% of the encounter time was spent on counseling for [unfilled] [Time Spent: ___ minutes] : I have spent [unfilled] minutes of face to face time with the patient

## 2020-08-28 ENCOUNTER — TELEPHONE (OUTPATIENT)
Dept: TRANSPLANT | Facility: CLINIC | Age: 60
End: 2020-08-28

## 2020-08-28 DIAGNOSIS — J84.9 ILD (INTERSTITIAL LUNG DISEASE): ICD-10-CM

## 2020-08-28 DIAGNOSIS — Z76.82 LUNG TRANSPLANT CANDIDATE: Primary | ICD-10-CM

## 2020-08-28 DIAGNOSIS — M32.13 SYSTEMIC LUPUS ERYTHEMATOSUS WITH LUNG INVOLVEMENT, UNSPECIFIED SLE TYPE: ICD-10-CM

## 2020-08-28 DIAGNOSIS — Z01.812 PRE-PROCEDURE LAB EXAM: ICD-10-CM

## 2020-08-28 NOTE — PROGRESS NOTES
Provider schedule change. Orders entered at provider's (I. Agnieszka's) request TORB, PFTs, 6MWT. Covid testing per protocol.

## 2020-09-08 ENCOUNTER — OFFICE VISIT (OUTPATIENT)
Dept: BARIATRICS | Facility: CLINIC | Age: 60
End: 2020-09-08
Payer: MEDICARE

## 2020-09-08 ENCOUNTER — TELEPHONE (OUTPATIENT)
Dept: BARIATRICS | Facility: CLINIC | Age: 60
End: 2020-09-08

## 2020-09-08 VITALS
BODY MASS INDEX: 35.17 KG/M2 | HEIGHT: 70 IN | SYSTOLIC BLOOD PRESSURE: 110 MMHG | WEIGHT: 245.63 LBS | DIASTOLIC BLOOD PRESSURE: 60 MMHG | HEART RATE: 64 BPM

## 2020-09-08 DIAGNOSIS — E66.01 CLASS 2 SEVERE OBESITY DUE TO EXCESS CALORIES WITH SERIOUS COMORBIDITY AND BODY MASS INDEX (BMI) OF 35.0 TO 35.9 IN ADULT: Primary | ICD-10-CM

## 2020-09-08 PROCEDURE — 99213 OFFICE O/P EST LOW 20 MIN: CPT | Mod: S$PBB,TXP,, | Performed by: STUDENT IN AN ORGANIZED HEALTH CARE EDUCATION/TRAINING PROGRAM

## 2020-09-08 PROCEDURE — 99999 PR PBB SHADOW E&M-EST. PATIENT-LVL IV: CPT | Mod: PBBFAC,TXP,, | Performed by: STUDENT IN AN ORGANIZED HEALTH CARE EDUCATION/TRAINING PROGRAM

## 2020-09-08 PROCEDURE — 99214 OFFICE O/P EST MOD 30 MIN: CPT | Mod: PBBFAC,TXP | Performed by: STUDENT IN AN ORGANIZED HEALTH CARE EDUCATION/TRAINING PROGRAM

## 2020-09-08 PROCEDURE — 99999 PR PBB SHADOW E&M-EST. PATIENT-LVL IV: ICD-10-PCS | Mod: PBBFAC,TXP,, | Performed by: STUDENT IN AN ORGANIZED HEALTH CARE EDUCATION/TRAINING PROGRAM

## 2020-09-08 PROCEDURE — 99213 PR OFFICE/OUTPT VISIT, EST, LEVL III, 20-29 MIN: ICD-10-PCS | Mod: S$PBB,TXP,, | Performed by: STUDENT IN AN ORGANIZED HEALTH CARE EDUCATION/TRAINING PROGRAM

## 2020-09-08 RX ORDER — SEMAGLUTIDE 1.34 MG/ML
0.5 INJECTION, SOLUTION SUBCUTANEOUS
Qty: 1.5 ML | Refills: 0 | Status: SHIPPED | OUTPATIENT
Start: 2020-09-08 | End: 2020-09-30

## 2020-09-08 RX ORDER — SILVER SULFADIAZINE 10 G/1000G
CREAM TOPICAL
COMMUNITY
Start: 2020-08-06 | End: 2021-08-06

## 2020-09-08 RX ORDER — LEVETIRACETAM 250 MG/1
250 TABLET ORAL DAILY
COMMUNITY
Start: 2020-06-08 | End: 2023-07-07

## 2020-09-08 RX ORDER — TRIAMCINOLONE ACETONIDE 1 MG/G
CREAM TOPICAL
COMMUNITY
Start: 2020-07-28 | End: 2023-07-07

## 2020-09-08 RX ORDER — SILVER SULFADIAZINE 10 G/1000G
CREAM TOPICAL
COMMUNITY
Start: 2020-08-06 | End: 2020-09-08 | Stop reason: ALTCHOICE

## 2020-09-08 NOTE — PROGRESS NOTES
Subjective:       Patient ID: Katy Vega is a 60 y.o. female.    Chief Complaint: weight gain, follow-up    Patient presents for follow-up. She was started on Ozempic last month. She denies any adverse side effects.    Diet Recall: salads, beans, BBQ shrimp, pasta, ramen noodles with mixed veegtables, jambalaya, grapes, bananas    Physical Activity: Walking around the park    Co-morbidities:   HTN  ILD  COPD  Lupus  Seizure  Stroke    Medical Weight Loss History  8/7/2020: 253.9 lbs, BMI 36.94, BFP 48%, SMM 73.6 lbs; Ozempic 0.25 mg weekly injections  9/8/2020: 245.6 lbs, BMI 35.8, BFP 47.4, SMM 72.1 lbs; Ozempic 0.5 mg weekly injections    Review of Systems   Constitutional: Negative for chills and fever.   HENT: Negative for sore throat and trouble swallowing.    Eyes: Negative for visual disturbance.   Respiratory: Negative for shortness of breath.    Cardiovascular: Negative for chest pain and palpitations.   Gastrointestinal: Negative for abdominal pain, nausea and vomiting.   Integumentary:  Negative for rash.   Neurological: Negative for dizziness, vertigo and light-headedness.   Psychiatric/Behavioral: The patient is not nervous/anxious.          Objective:        Ref. Range 6/8/2020 09:30   Hemoglobin A1C External Latest Ref Range: 4.7 - 5.6 % 5.1 ((NONE))   Estimated Avg Glucose Latest Ref Range: <115 mg/dL 100 ((NONE))     Weight: 245.6 lbs  BMI 35.75  BFP 47.4%  SMM 72.1 lbs  BMR 1635 kcal    Vitals:    09/08/20 1316   BP: 110/60   Pulse: 64       Physical Exam  Vitals signs reviewed.   Constitutional:       General: She is not in acute distress.     Appearance: Normal appearance. She is obese. She is not ill-appearing, toxic-appearing or diaphoretic.   HENT:      Head: Normocephalic and atraumatic.   Eyes:      Extraocular Movements: Extraocular movements intact.   Neck:      Musculoskeletal: Normal range of motion.   Cardiovascular:      Rate and Rhythm: Normal rate.   Pulmonary:      Effort:  Pulmonary effort is normal. No respiratory distress.   Musculoskeletal: Normal range of motion.      Right lower leg: No edema.      Left lower leg: No edema.   Skin:     General: Skin is warm and dry.   Neurological:      General: No focal deficit present.      Mental Status: She is alert and oriented to person, place, and time.      Gait: Gait normal.   Psychiatric:         Mood and Affect: Mood normal.         Behavior: Behavior normal.         Thought Content: Thought content normal.         Judgment: Judgment normal.         Assessment:       1. Class 2 severe obesity due to excess calories with serious comorbidity and body mass index (BMI) of 35.0 to 35.9 in adult        Plan:   - Ozempic 0.5 mg weekly injections    - Log all food and beverage intake with a daily calorie goal of 1200 calories    - Moderate intensity aerobic exercise for 15-30 minutes 3-4x/week    - Return to clinic in 4 weeks

## 2020-09-08 NOTE — PATIENT INSTRUCTIONS
Food and Beverage Log:  Keep a log of all food and beverages that you consume.  Keeping track of calories will help you lose weight, because monitoring will help you become more aware of what you are eating and recognize your eating patterns.  Be mindful of your hunger level before eating and your satiety level after eating.  Just keeping records will help you make healthy changes in your diet and eat fewer calories.    Tips for keeping a calorie count:     ? Each day, aim for the daily calorie goal.     ? Record your food intake immediately after eating. If possible, look up calories before or immediately after eating.     ? Download an justin like My Fitness Pal, Lose It!, or [employer] (Code: 43986) To keep track of your calories.    ? Measuring foods (using measuring cups or spoons) will help you be more accurate with counting calories.     ? Keep a running calorie count throughout the day.     ? Count daily calories toward a weekly calorie total. If you eat too many calories one day, cut back slightly over the next few days to meet your weekly goal.       Meal Planning & Grocery Shopping    Meal planning builds the foundation for healthy eating. When you have structured ideas for healthy meals and foods available at home to prepare those meals, weight control becomes easier.  If only healthy foods are available at home, then you will be much more likely to eat healthy foods. And you will be less likely to go to a restaurant or  a fast food meal, which tend to be unhealthy and higher in calories than meals prepared at home.      Take 5-10 minutes each week to plan meals for the next 7 days.  Make a grocery list based on the meal plan.    Grocery Shopping Tips:  ? Shop on a full stomach.  ? Schedule your shopping for times when you are most motivated and able to be disciplined about your purchases. For example, after a stressful day at work it may be difficult to make the healthiest choices. Shopping at other  times, such as early in the morning or after dinner, may be easier.  ? Focus your shopping on the outside aisles of the store, which tend to contain more fresh foods and lower calorie foods. The inside aisles tend to have more processed foods.  ? Stick to your list. Avoid buying unhealthy items just because they are on sale.   ? Compare nutrition labels to check the number of calories and percentage of fat.      What to buy:    Vegetables  - Fresh vegetables  - Frozen vegetables with no sauce or added salt  - Canned vegetables with no sauce or added salt    Protein  - Lean meats, such as chicken and turkey  - Limit red meats, such as beef to no more than 1x/week  - Limit processed meats, such as cold cuts, zavala, sausage, and hot dogs. Look for brands that have no nitrites and are minimally processed. Consider turkey sausage or turkey zavala.  - Fish and Shellfish  - Eggs  - Dried beans  - Canned beans (reduced sodium)    Fat  - Use healthy oils, such as olive oil or canola oil, for cooking, salad dressings, etc.  - Unflavored nuts and seeds  - Nut butters (no added sugar)    Dairy  - Yogurt (no sugar added)  - Cheese  - Low-fat milk  - Unsweetened nondairy milks (almond milk, soy milk, etc)    Fruit  - Fresh Fruit  - Frozen fruit with no added sugar  - Canned fruit with no added sugar  - Dried fruit with no added sugar  - 100% fruit juice    Whole Grains  - Single ingredient grains, such as oats, quinoa, brown rice  - Whole-wheat pasta  - Sprouted whole-grain bread    What to avoid:  - Avoid fried foods  - Avoid foods with added sugar  - Avoid sugar-sweetened beverages  - Avoid ultra-processed foods    Sample meal plan  80-120g protein; 6523-2345 calories  Protein drinks and bars: 0-4 grams sugar  Drink lots of water throughout the day and exercise!  MENU # 1  Breakfast: 2 eggs, 1 turkey sausage antonio  Lunch: 2-3 roll-ups (sliced turkey, low-fat slice cheese), baby carrots dipped in 1 Tbsp natural peanut  butter  Mid-Day Snack: ¼ cup unsalted almonds, ½ cup fruit  Supper: 1 chicken thigh simmered in tomato sauce + 2 Tbsp mozzarella cheese, ½ cup black beans, 1/2 cup steamed veggies  Evening Snack: 1/2 cup grapes with 4 cubes low-fat cheddar cheese     MENU # 2  Breakfast: protein drink  Mid-morning snack : ¼ cup unsalted nuts  Lunch: 1 cup tuna or chicken salad made with light hernandez, over salad.   Supper: hamburger antonio, slice of cheese, 1 cup steamed veggies.   Snack: light yogurt    Menu #3  Breakfast: 6oz plain Greek yogurt + fruit (½ banana, ½ cup fruit - pineapple, blueberries, strawberries, peach), may add Splenda to sabas.  Lunch: ½  chicken breast w/ slice pepper abner cheese, 1/2 cup steamed veggies and small salad with Salad Spritzer.   Mid-Day snack: protein drink   Supper: 4oz Grilled fish, grilled veggie kabob ( mushrooms, onion, bell pepper, yellow squash, zucchini, cherry tomatoes)  Evening Snack: fudgsicle no-sugar-added    Menu # 4  Breakfast: vanilla iced coffee: 1 scoop vanilla protein powder + 4oz skim milk + 4oz coffee   Snack: protein bar  Lunch: 2 Lettuce tacos: ¼ cup seasoned ground turkey wrapped in a Fernando lettuce leaf with 1 Tbsp shredded cheese and dollop low-fat sour cream  Dinner: Shrimp omelet: 2 eggs, ½ cup shrimp, green onions, and shredded cheese    Menu #5  Breakfast: 1 cup low-fat cottage cheese, ½ cup peaches (no sugar added)  Lunch: 2 oz baked pork chop, 1 cup okra and tomato stew  Snack: 1 cup black beans + salsa + dollop sour cream  Dinner: Caprese chicken salad: 2 oz chicken, 1oz fresh mozzarella, sliced tomato, 1 Tbsp olive oil, basil  Snack: sugar-free pudding cup    Menu #6  Breakfast: ½ cup part-skim ricotta cheese, 2 Tbsp sugar-free strawberry preserves, ¼ cup slivered almonds  Lunch: 2 oz canned chicken, 1oz shredded cheddar cheese, ½ cup black beans, 2 Tbsp salsa  Snack: Protein drink  Dinner: 4 oz grilled salmon steak, over mixed green salad with light  "dressing      Exercises for Diastasis Recti     1. Belly Breathing  Lie flat on your back on a mat. Take a deep inhale, allowing your belly and then your chest to fully inflate. Then, slowly and forcefully exhale, drawing your abdominal wall in as if you had a corset on as you do so.     2. Drawing In Exercise  Drawing in is NOT sucking in (which uses the outer muscles). Drawing in is done by allowing those inner muscles to pull your abdomen deep inside toward your spine. Draw in from the lower region of your abs. Drawing in is something you can do in sets for as long as you can hold (work up to 60 seconds at a time), or its something you can simply do as you walk around your house. Believe it or not, your posture will improve big time with this! This can be done standing, sitting, lying downyou name it.      3. "Vacuuming"  Stand up straight, inhale deeply, followed by an exhale. Once all of the air is deflated from your lungs, draw your belly button in and up toward your spine. HOLD (as tough as it is) for as long as you can. I aim for 30 seconds, but if holding your breath that long isnt doable, simply keep the clock running and keep your belly button in and tight as you slowly breathe for the remainder of the 30 seconds. Try for 5-10 reps a day.     4. "Candles"  Can be done seated, standing, lying or even on hands and knees. Breathe in and on the exhale blow or hiss all the air out like you are blowing out 90 candles on a cake.  Keep the hissing/blowing slow and steady. Dont cheat by forcing all the air out in the exhale at the beginning. Feel your abs coming in and engaging as you exhale all of the way to the end. Each time you inhale, gently release the ab contraction. The abs should not pull in hard or forcefully, they should react / respond to the slow, long exhale by moving in and tightening together with the exhale.     5. Tabletop/reverse marching  Begin by lying on your back with knees bent and feet " flat on the floor. Bolster your head if necessary to keep your ribcage down. Likewise, make sure your pelvis is neutral - the common cheat here is to tuck the pelvis under and round the lower back to the floor. Inhale, then on your next exhale, blow as if youre blowing the seeds off a dandelion or blowing out candles. As a result of the exhale, you will feel your deep core engage and move in. While you continue exhaling, slowly bring one knee in toward your body. Keep the knee bent. Your thigh will be perpendicular to the floor at the top of the move, as in tabletop. On your next exhale, bring the other leg up parallel to the first leg.     6. Heel Slide  Lie on your back with both knees bent, feet flat. Exhale as you engage your core and pelvic floor, and slide one heel slowly along the ground until your leg is straight. Inhale and relax, then engage your core, exhale, and draw that heel back. Do 5 reps on each leg.     7. Quadruped Abdominal  Get on all fours (hands under your shoulders and knees under hips) and pull your shoulders wide and away from your ears to form a flat back. From here, take a slow, deep inhale, allowing your abdominal wall to relax and expand toward the floor. Then exhale, drawing the muscles up and in while maintaining a flat back.

## 2020-09-11 ENCOUNTER — TELEPHONE (OUTPATIENT)
Dept: TRANSPLANT | Facility: CLINIC | Age: 60
End: 2020-09-11

## 2020-09-14 ENCOUNTER — LAB VISIT (OUTPATIENT)
Dept: PRIMARY CARE CLINIC | Facility: CLINIC | Age: 60
End: 2020-09-14
Payer: MEDICARE

## 2020-09-14 DIAGNOSIS — Z76.82 LUNG TRANSPLANT CANDIDATE: ICD-10-CM

## 2020-09-14 DIAGNOSIS — Z01.812 PRE-PROCEDURE LAB EXAM: ICD-10-CM

## 2020-09-14 LAB — SARS-COV-2 RNA RESP QL NAA+PROBE: NOT DETECTED

## 2020-09-14 PROCEDURE — U0003 INFECTIOUS AGENT DETECTION BY NUCLEIC ACID (DNA OR RNA); SEVERE ACUTE RESPIRATORY SYNDROME CORONAVIRUS 2 (SARS-COV-2) (CORONAVIRUS DISEASE [COVID-19]), AMPLIFIED PROBE TECHNIQUE, MAKING USE OF HIGH THROUGHPUT TECHNOLOGIES AS DESCRIBED BY CMS-2020-01-R: HCPCS | Mod: TXP

## 2020-09-17 ENCOUNTER — HOSPITAL ENCOUNTER (OUTPATIENT)
Dept: PULMONOLOGY | Facility: CLINIC | Age: 60
Discharge: HOME | End: 2020-09-17
Payer: MEDICARE

## 2020-09-17 ENCOUNTER — OFFICE VISIT (OUTPATIENT)
Dept: TRANSPLANT | Facility: CLINIC | Age: 60
End: 2020-09-17
Payer: MEDICARE

## 2020-09-17 VITALS
HEIGHT: 69 IN | DIASTOLIC BLOOD PRESSURE: 60 MMHG | TEMPERATURE: 98 F | HEART RATE: 72 BPM | RESPIRATION RATE: 18 BRPM | BODY MASS INDEX: 36.14 KG/M2 | SYSTOLIC BLOOD PRESSURE: 129 MMHG | WEIGHT: 244 LBS

## 2020-09-17 VITALS — WEIGHT: 246 LBS | HEIGHT: 69 IN | BODY MASS INDEX: 36.43 KG/M2

## 2020-09-17 DIAGNOSIS — Z76.82 LUNG TRANSPLANT CANDIDATE: ICD-10-CM

## 2020-09-17 DIAGNOSIS — Z76.82 LUNG TRANSPLANT CANDIDATE: Primary | ICD-10-CM

## 2020-09-17 DIAGNOSIS — J84.9 ILD (INTERSTITIAL LUNG DISEASE): ICD-10-CM

## 2020-09-17 DIAGNOSIS — M32.13 SYSTEMIC LUPUS ERYTHEMATOSUS WITH LUNG INVOLVEMENT, UNSPECIFIED SLE TYPE: ICD-10-CM

## 2020-09-17 DIAGNOSIS — E66.9 OBESITY (BMI 30-39.9): ICD-10-CM

## 2020-09-17 DIAGNOSIS — C50.919 MALIGNANT NEOPLASM OF FEMALE BREAST, UNSPECIFIED ESTROGEN RECEPTOR STATUS, UNSPECIFIED LATERALITY, UNSPECIFIED SITE OF BREAST: ICD-10-CM

## 2020-09-17 LAB
DLCO ADJ PRE: 18.77 ML/(MIN*MMHG) (ref 20.2–31.66)
DLCO SINGLE BREATH LLN: 20.2
DLCO SINGLE BREATH PRE REF: 72.4 %
DLCO SINGLE BREATH REF: 25.93
DLCOC SBVA LLN: 3.24
DLCOC SBVA PRE REF: 122.3 %
DLCOC SBVA REF: 4.5
DLCOC SINGLE BREATH LLN: 20.2
DLCOC SINGLE BREATH PRE REF: 72.4 %
DLCOC SINGLE BREATH REF: 25.93
DLCOCSBVAULN: 5.76
DLCOCSINGLEBREATHULN: 31.66
DLCOSINGLEBREATHULN: 31.66
DLCOVA LLN: 3.24
DLCOVA PRE REF: 122.3 %
DLCOVA PRE: 5.51 ML/(MIN*MMHG*L) (ref 3.24–5.76)
DLCOVA REF: 4.5
DLCOVAULN: 5.76
DLVAADJ PRE: 5.51 ML/(MIN*MMHG*L) (ref 3.24–5.76)
FEF 25 75 LLN: 0.98
FEF 25 75 PRE REF: 58.6 %
FEF 25 75 REF: 2.25
FEV05 LLN: 1.24
FEV05 REF: 2.09
FEV1 FVC LLN: 68
FEV1 FVC PRE REF: 103.1 %
FEV1 FVC REF: 79
FEV1 LLN: 1.85
FEV1 PRE REF: 49.2 %
FEV1 REF: 2.53
FVC LLN: 2.38
FVC PRE REF: 47.4 %
FVC REF: 3.22
IVC PRE: 1.68 L (ref 2.38–4.06)
IVC SINGLE BREATH LLN: 2.38
IVC SINGLE BREATH PRE REF: 52 %
IVC SINGLE BREATH REF: 3.22
IVCSINGLEBREATHULN: 4.06
PEF LLN: 4.18
PEF PRE REF: 32.6 %
PEF REF: 6.5
PHYSICIAN COMMENT: ABNORMAL
PRE DLCO: 18.77 ML/(MIN*MMHG) (ref 20.2–31.66)
PRE FEF 25 75: 1.32 L/S (ref 0.98–3.52)
PRE FET 100: 6.1 SEC
PRE FEV05 REF: 41.8 %
PRE FEV1 FVC: 81.66 % (ref 67.68–90.74)
PRE FEV1: 1.25 L (ref 1.85–3.21)
PRE FEV5: 0.88 L (ref 1.24–2.95)
PRE FVC: 1.53 L (ref 2.38–4.06)
PRE PEF: 2.12 L/S (ref 4.18–8.82)
VA PRE: 3.41 L (ref 5.61–5.61)
VA SINGLE BREATH LLN: 5.61
VA SINGLE BREATH PRE REF: 60.8 %
VA SINGLE BREATH REF: 5.61
VASINGLEBREATHULN: 5.61

## 2020-09-17 PROCEDURE — 94729 PR C02/MEMBANE DIFFUSE CAPACITY: ICD-10-PCS | Mod: 26,S$PBB,NTX, | Performed by: INTERNAL MEDICINE

## 2020-09-17 PROCEDURE — 94618 PULMONARY STRESS TESTING: CPT | Mod: PBBFAC,NTX | Performed by: INTERNAL MEDICINE

## 2020-09-17 PROCEDURE — 99214 PR OFFICE/OUTPT VISIT, EST, LEVL IV, 30-39 MIN: ICD-10-PCS | Mod: 25,S$PBB,NTX, | Performed by: INTERNAL MEDICINE

## 2020-09-17 PROCEDURE — 99214 OFFICE O/P EST MOD 30 MIN: CPT | Mod: 25,S$PBB,NTX, | Performed by: INTERNAL MEDICINE

## 2020-09-17 PROCEDURE — 99999 PR PBB SHADOW E&M-EST. PATIENT-LVL IV: CPT | Mod: PBBFAC,TXP,, | Performed by: INTERNAL MEDICINE

## 2020-09-17 PROCEDURE — 94618 PULMONARY STRESS TESTING: CPT | Mod: 26,S$PBB,NTX, | Performed by: INTERNAL MEDICINE

## 2020-09-17 PROCEDURE — 94010 BREATHING CAPACITY TEST: CPT | Mod: PBBFAC,NTX | Performed by: INTERNAL MEDICINE

## 2020-09-17 PROCEDURE — 94729 DIFFUSING CAPACITY: CPT | Mod: PBBFAC,NTX | Performed by: INTERNAL MEDICINE

## 2020-09-17 PROCEDURE — 99214 OFFICE O/P EST MOD 30 MIN: CPT | Mod: PBBFAC,TXP | Performed by: INTERNAL MEDICINE

## 2020-09-17 PROCEDURE — 94729 DIFFUSING CAPACITY: CPT | Mod: 26,S$PBB,NTX, | Performed by: INTERNAL MEDICINE

## 2020-09-17 PROCEDURE — 94010 BREATHING CAPACITY TEST: ICD-10-PCS | Mod: 26,59,S$PBB,NTX | Performed by: INTERNAL MEDICINE

## 2020-09-17 PROCEDURE — 99999 PR PBB SHADOW E&M-EST. PATIENT-LVL IV: ICD-10-PCS | Mod: PBBFAC,TXP,, | Performed by: INTERNAL MEDICINE

## 2020-09-17 PROCEDURE — 94618 PULMONARY STRESS TESTING: ICD-10-PCS | Mod: 26,S$PBB,NTX, | Performed by: INTERNAL MEDICINE

## 2020-09-17 PROCEDURE — 94010 BREATHING CAPACITY TEST: CPT | Mod: 26,59,S$PBB,NTX | Performed by: INTERNAL MEDICINE

## 2020-09-17 RX ORDER — AMLODIPINE BESYLATE 5 MG/1
1 TABLET ORAL DAILY
COMMUNITY
Start: 2020-09-09 | End: 2021-10-14 | Stop reason: DRUGHIGH

## 2020-09-17 NOTE — LETTER
September 18, 2020        Nishith M. Mewada  [address]  [facility] [address]  Phone: [phone]  Fax: [phone]             [facility] 1st [facility]  [address]  [facility] [address]  Phone: [phone]   Patient: Katy Vega   MR Number: [id number]   YOB: 1960   Date of Visit: 9/17/2020       Dear Dr. Nishith M. Mewada    Thank you for referring Katy Vega to me for evaluation. Attached you will find relevant portions of my assessment and plan of care.    If you have questions, please do not hesitate to call me. I look forward to following Katy Vega along with you.    Sincerely,    Evelin Aaron MD    Enclosure    If you would like to receive this communication electronically, please contact [email or web address] or [phone] to request [employer] access.    [employer] is a tool which provides read-only access to select patient information with whom you have a relationship. Its easy to use and provides real time access to review your patients record including encounter summaries, notes, results, and demographic information.    If you feel you have received this communication in error or would no longer like to receive these types of communications, please e-mail [email or web address]

## 2020-09-17 NOTE — PROGRESS NOTES
LUNG TRANSPLANT PRE FOLLOW-UP    Referring Physician: Nishith M. Mewada    Reason for Visit:  Pre-lung transplant follow-up.         Date of Initial Evaluation:                                                                                              History of Present Illness: Katy Vega is a 60 y.o. 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 She is on no assisted ventilation.  Her [place] Heart Association Class is II and a Karnofsky score of 80% - Normal activity with effort: some symptoms of disease. She is not diabetic.     Patient presents today for a routine follow-up.  Since her last visit, she was diagnosed with breast cancer in 03/2020 s/p lumpectomy (L) with radiation.  She denies any other hospitalizations related respiratory disease or exacerbations.  She feels otherwise well and denies any new respiratory concerns or limitations.      Brief history   Patient was diagnosed in [facility] in 2006 with ILD in the setting of SLE serologies being positive.  She follows up with rheumatology and pulmonary at [facility].      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   HENT: Negative for congestion, ear discharge, ear pain, hearing loss, nosebleeds, sinus pain, sore throat and tinnitus.    Eyes: Negative for blurred vision, double vision, photophobia, pain, discharge and redness.   Respiratory: Positive for shortness of breath. Negative for cough, hemoptysis, sputum production, wheezing and stridor.    Cardiovascular: Negative for chest pain, palpitations, orthopnea, claudication, leg swelling and PND.   Gastrointestinal: Negative for  "abdominal pain, blood in stool, constipation, diarrhea, heartburn, melena, nausea and vomiting.   Genitourinary: Negative for dysuria, flank pain, frequency, hematuria and urgency.   Musculoskeletal: Positive for back pain. Negative for falls, joint pain, myalgias and neck pain.   Skin: Negative for itching and rash.   Neurological: Negative for dizziness, tingling, tremors, sensory change, speech change, focal weakness, seizures, loss of consciousness, weakness and headaches.   Endo/Heme/Allergies: Negative for environmental allergies and polydipsia. Does not bruise/bleed easily.   Psychiatric/Behavioral: Positive for memory loss. Negative for depression, hallucinations, substance abuse and suicidal ideas. The patient is not nervous/anxious and does not have insomnia.      Objective:   /60 (BP Location: Right arm, Patient Position: Sitting, BP Method: Large (Automatic))   Pulse 72   Temp 97.7 °F (36.5 °C) (Oral)   Resp 18   Ht 5' 9" (1.753 m)   Wt 110.7 kg (244 lb)   LMP  (LMP Unknown)   BMI 36.03 kg/m²   Physical Exam  Constitutional:       Appearance: Normal appearance. She is well-developed.   HENT:      Head: Normocephalic and atraumatic.   Eyes:      Conjunctiva/sclera: Conjunctivae normal.      Pupils: Pupils are equal, round, and reactive to light.   Neck:      Musculoskeletal: Normal range of motion and neck supple.   Cardiovascular:      Rate and Rhythm: Normal rate and regular rhythm.      Heart sounds: Normal heart sounds.   Pulmonary:      Effort: Pulmonary effort is normal.      Breath sounds: Normal breath sounds.   Abdominal:      General: Bowel sounds are normal.      Palpations: Abdomen is soft.   Musculoskeletal: Normal range of motion.   Skin:     General: Skin is warm and dry.   Neurological:      Mental Status: She is alert and oriented to person, place, and time.       Labs:    Lab Visit on 09/14/2020   Component Date Value    SARS-CoV2 (COVID-19) [place]* 09/14/2020 Not Detected  "       Pulmonary Function Tests 9/17/2020 7/30/2019   FVC 1.53 1.6   FEV1 1.25 1.24   TLC (liters) - 2.54   DLCO (ml/mmHg sec) 18.8 14.6   FVC% 47 45   FEV1% 49 44   TLC% - 43   RV - 1.03   RV% - 46   DLCO% 72 71     6MW 9/17/2020 7/30/2019   6MWT Status completed without stopping completed without stopping   Patient Reported Dyspnea;Dizziness Lightheadedness;Dyspnea   Was O2 used? No No   6MW Distance walked (feet) 1200 1277   Distance walked (meters) 365.76 389.23   Did patient stop? No No   Oxygen Saturation 96 99   Supplemental Oxygen Room Air Room Air   Heart Rate 74 81   Blood Pressure 128/66 144/67   Tee Dyspnea Rating  somewhat heavy somewhat heavy   Oxygen Saturation 98 90   Supplemental Oxygen Room Air Room Air   Heart Rate 94 79   Blood Pressure 129/59 161/64   Tee Dyspnea Rating  very heavy very heavy   Recovery Time (seconds) 74 137   Oxygen Saturation 97 98   Supplemental Oxygen Room Air Room Air   Heart Rate 69 68       Assessment:-  1. Lung transplant candidate    2. ILD (interstitial lung disease)    3. Obesity (BMI 30-39.9)    4. Malignant neoplasm of female breast, unspecified estrogen receptor status, unspecified laterality, unspecified site of breast      Plan:   - Patient appears to be clinically and physiologically early for the consideration of lung transplant.  Moreover, she has the following barriers that pose as potential contraindication to proceed with lung transplant: 1.  Her recent Breast cancer and 2.  Her BMI of 36.  - A 5-year disease-free interval for breast cancer is prudent for our consideration of lung transplant.   Referring physician may consider discontinuation of Mycophenate given the recent malignancy.  Moreover, we will need follow-up surveillance proofs with progress notes and images to ensure continued disease-free interval during our follow-up period.    -Continue scheduled follow-up with primary pulmonologist and rheumatologist.    -Dietary/lifestyle modifications  suggested with a target BMI goal of </=30 recommended.  Consider referral to dietary to aid with weight loss measures.      RTC in 1 year with spirometry     Evelin Aaron MD  Pulmonary/Critical Care Medicine/Transplant Pulmonology  [facility] [place]  9/18/2020

## 2020-09-19 NOTE — PROCEDURES
Katy Vega is a 60 y.o.  female patient, who presents for a 6 minute walk test ordered by DO Jen.  The diagnosis is Interstitial Lung Disease.  The patient's BMI is 36.3 kg/m2.  Predicted distance (lower limit of normal) is 307.52 meters.      Test Results:    The test was completed without stopping.  The total time walked was 360 seconds.  During walking, the patient reported:  Dyspnea, Dizziness(back pain).  The patient used no assistive devices during testing.     09/17/2020---------Distance: 365.76 meters (1200 feet)     O2 Sat % Supplemental Oxygen Heart Rate Blood Pressure Tee Scale   Pre-exercise  (Resting) 96 % Room Air 74 bpm 128/66 mmHg 4   During Exercise 98 % Room Air 94 bpm 129/59 mmHg 7-8   Post-exercise  (Recovery) 97 % Room Air  69 bpm       Recovery Time: 74 seconds    Performing nurse/tech: John GUEVARA      PREVIOUS STUDY:   07/30/2019---------Distance: 389.23 meters (1277 feet)       O2 Sat % Supplemental Oxygen Heart Rate Blood Pressure Tee Scale   Pre-exercise  (Resting) 99 % Room Air 81 bpm 144/67 mmHg 4   During Exercise   90 % Room Air 79 bpm 161/64 mmHg 7-8   Post-exercise  (Recovery) 98 % Room Air  68 bpm 150/67 mmHg         CLINICAL INTERPRETATION:  Six minute walk distance is 365.76 meters (1200 feet) with very heavy dyspnea.  During exercise, there was no desaturation while breathing room air.  Blood pressure remained stable and Heart rate increased significantly with walking.  The patient reported non-pulmonary symptoms during exercise.  Since the previous study in July 2019, exercise capacity is unchanged.  Based upon age and body mass index, exercise capacity is normal.

## 2020-09-21 ENCOUNTER — TELEPHONE (OUTPATIENT)
Dept: TRANSPLANT | Facility: CLINIC | Age: 60
End: 2020-09-21

## 2020-09-21 NOTE — TELEPHONE ENCOUNTER
Faxed clinic note, PFTs, 6MWT to [facility]. Note patient has scheduled follow up on 9/22.

## 2020-10-08 ENCOUNTER — OFFICE VISIT (OUTPATIENT)
Dept: BARIATRICS | Facility: CLINIC | Age: 60
End: 2020-10-08
Payer: MEDICARE

## 2020-10-08 VITALS
DIASTOLIC BLOOD PRESSURE: 52 MMHG | HEART RATE: 70 BPM | SYSTOLIC BLOOD PRESSURE: 82 MMHG | BODY MASS INDEX: 35.6 KG/M2 | OXYGEN SATURATION: 98 % | WEIGHT: 240.38 LBS | HEIGHT: 69 IN

## 2020-10-08 DIAGNOSIS — I10 HYPERTENSION, UNSPECIFIED TYPE: ICD-10-CM

## 2020-10-08 DIAGNOSIS — E66.01 CLASS 2 SEVERE OBESITY DUE TO EXCESS CALORIES WITH SERIOUS COMORBIDITY AND BODY MASS INDEX (BMI) OF 35.0 TO 35.9 IN ADULT: Primary | ICD-10-CM

## 2020-10-08 PROCEDURE — 99213 OFFICE O/P EST LOW 20 MIN: CPT | Mod: S$PBB,TXP,, | Performed by: STUDENT IN AN ORGANIZED HEALTH CARE EDUCATION/TRAINING PROGRAM

## 2020-10-08 PROCEDURE — 99999 PR PBB SHADOW E&M-EST. PATIENT-LVL IV: ICD-10-PCS | Mod: PBBFAC,TXP,, | Performed by: STUDENT IN AN ORGANIZED HEALTH CARE EDUCATION/TRAINING PROGRAM

## 2020-10-08 PROCEDURE — 99214 OFFICE O/P EST MOD 30 MIN: CPT | Mod: PBBFAC,NTX | Performed by: STUDENT IN AN ORGANIZED HEALTH CARE EDUCATION/TRAINING PROGRAM

## 2020-10-08 PROCEDURE — 99999 PR PBB SHADOW E&M-EST. PATIENT-LVL IV: CPT | Mod: PBBFAC,TXP,, | Performed by: STUDENT IN AN ORGANIZED HEALTH CARE EDUCATION/TRAINING PROGRAM

## 2020-10-08 PROCEDURE — 99213 PR OFFICE/OUTPT VISIT, EST, LEVL III, 20-29 MIN: ICD-10-PCS | Mod: S$PBB,TXP,, | Performed by: STUDENT IN AN ORGANIZED HEALTH CARE EDUCATION/TRAINING PROGRAM

## 2020-10-08 RX ORDER — SEMAGLUTIDE 1.34 MG/ML
0.25 INJECTION, SOLUTION SUBCUTANEOUS
COMMUNITY
End: 2020-10-08 | Stop reason: SDUPTHER

## 2020-10-08 RX ORDER — SEMAGLUTIDE 1.34 MG/ML
0.5 INJECTION, SOLUTION SUBCUTANEOUS
Qty: 1.5 ML | Refills: 0 | Status: SHIPPED | OUTPATIENT
Start: 2020-10-08 | End: 2020-10-30

## 2020-10-08 NOTE — PATIENT INSTRUCTIONS
"Walk Away the Pounds videos on You Tube      Exercises for Diastasis Recti     1. Belly Breathing  Lie flat on your back on a mat. Take a deep inhale, allowing your belly and then your chest to fully inflate. Then, slowly and forcefully exhale, drawing your abdominal wall in as if you had a corset on as you do so.     2. Drawing In Exercise  Drawing in is NOT sucking in (which uses the outer muscles). Drawing in is done by allowing those inner muscles to pull your abdomen deep inside toward your spine. Draw in from the lower region of your abs. Drawing in is something you can do in sets for as long as you can hold (work up to 60 seconds at a time), or its something you can simply do as you walk around your house. Believe it or not, your posture will improve big time with this! This can be done standing, sitting, lying downyou name it.      3. "Vacuuming"  Stand up straight, inhale deeply, followed by an exhale. Once all of the air is deflated from your lungs, draw your belly button in and up toward your spine. HOLD (as tough as it is) for as long as you can. I aim for 30 seconds, but if holding your breath that long isnt doable, simply keep the clock running and keep your belly button in and tight as you slowly breathe for the remainder of the 30 seconds. Try for 5-10 reps a day.     4. "Candles"  Can be done seated, standing, lying or even on hands and knees. Breathe in and on the exhale blow or hiss all the air out like you are blowing out 90 candles on a cake.  Keep the hissing/blowing slow and steady. Dont cheat by forcing all the air out in the exhale at the beginning. Feel your abs coming in and engaging as you exhale all of the way to the end. Each time you inhale, gently release the ab contraction. The abs should not pull in hard or forcefully, they should react / respond to the slow, long exhale by moving in and tightening together with the exhale.     5. Tabletop/reverse marching  Begin by lying on " your back with knees bent and feet flat on the floor. Bolster your head if necessary to keep your ribcage down. Likewise, make sure your pelvis is neutral - the common cheat here is to tuck the pelvis under and round the lower back to the floor. Inhale, then on your next exhale, blow as if youre blowing the seeds off a dandelion or blowing out candles. As a result of the exhale, you will feel your deep core engage and move in. While you continue exhaling, slowly bring one knee in toward your body. Keep the knee bent. Your thigh will be perpendicular to the floor at the top of the move, as in tabletop. On your next exhale, bring the other leg up parallel to the first leg.     6. Heel Slide  Lie on your back with both knees bent, feet flat. Exhale as you engage your core and pelvic floor, and slide one heel slowly along the ground until your leg is straight. Inhale and relax, then engage your core, exhale, and draw that heel back. Do 5 reps on each leg.     7. Quadruped Abdominal  Get on all fours (hands under your shoulders and knees under hips) and pull your shoulders wide and away from your ears to form a flat back. From here, take a slow, deep inhale, allowing your abdominal wall to relax and expand toward the floor. Then exhale, drawing the muscles up and in while maintaining a flat back.       SEATED RESISTANCE BAND EXERCISES    If you do not have a resistance band, or do not feel comfortable using a resistance band, these exercises can also be done holding a light hand weight or water bottle.  If you are just starting to exercise, you may want to go through the motions without any weights or resistance till you become comfortable with the movements.    Do each of the movements shown 10 times (10 repetitions). You can repeat the exercises a second or  third time as well for greater benefit. The amount of tension on the resistance bands should be adjusted so  you can complete one set of 10 repetitions with  effort. Increase the tension every few weeks. Do these  exercises 2 or 3 times a week.    * If an exercise hurts your back or joints, stop doing that particular exercise, but keep doing all the others *      CHEST EXERCISE        Start Position:   Sit tall, with feet shoulder width apart and feet in front of knees.   Belly pulled in.   Place the band around your upper back, grasp band in each hand, knuckles (rings) facing front. Arms  should be bent so that knuckles are in front of elbows   Slide shoulder blades down your back and slightly together (as if making a V).   Relax your neck.    To Perform This Exercise:   Press hands forward to lengthen arms using chest muscles (not arms!).   Dont arch your back!)   Return to start position and repeat 10 times.   Breathe!        BACK EXERCISE        Start Position:   Sit tall, with feet shoulder width apart and feet in front of knees.   Belly pulled in.   Grasp band in each hand and raise overhead. Arms should be slightly wider than shoulder width and no  slack in the band.   Slide shoulder blades down your back and slightly together (as if making a V).   Relax your neck.    To Perform This Exercise:   Open arms pulling down towards chest using upper back muscles (not arms!).   Squeeze through shoulder blades at the bottom of the movement (dont arch your back!).   Return to start position and repeat 10 times.   Breathe!        SHOULDER EXERCISE        Start Position:   Sit tall, with feet shoulder width apart and feet in front of knees.   Belly pulled in.   Sit on band so that you can grasp band in one hand with tension on the band, but not so much tension that  you cannot straighten the arm. It may take a few tries to find the right amount of tension.   Slide shoulder blades down your back and slightly together (as if making a V).   Relax your neck.    To Perform This Exercise:   Press fist to the ceiling, slightly in front of the  body.   SLOWLY return to start position and repeat 10 times.   Switch sides and repeat on the other side.   Breathe!        TRICEPS EXERCISE        Start Position:   Sit tall, with feet shoulder width apart and feet in front of knees.   Belly pulled in.   Sit on one end of the band. Grasp other end of band in one hand.   Point elbow directly toward the ceiling (if this is difficult, you may support the upper arm with the opposite  hand)   Be sure there is no slack in the band in the starting position.   Slide shoulder blades down your back and slightly together (as if making a V).   Relax your neck.    To Perform This Exercise:   Extend your arm up to the ceiling, as shown.   Squeeze through shoulder blades at the bottom of the movement (dont arch your back!).   SLOWLY return to start position and repeat 10 times.   Repeat on the other side.   Breathe!        BICEP EXERCISE        Start Position:   Sit tall, with feet shoulder width apart and feet in front of knees.   Belly pulled in.   Place center of exercise band under one foot and step the end of the band under the other foot.   Grasp each end of the band with one hand. Make sure there is no slack between your foot and the hand  that holds the band.   Hold your elbows to your sides.   Pull abdominals in, lift chest, press shoulders down and back.    To Perform This Exercise:   As you curl up, keep your wrist from changing position in relation to your forearm and your arm stable  from the shoulder to the elbow.   Bend and straighten your elbow in a slow and controlled movement. Repeat this motion 10 times.   Repeat on the other side.   Breathe!

## 2020-10-08 NOTE — PROGRESS NOTES
Subjective:       Patient ID: Katy Vega is a 60 y.o. female.    Chief Complaint: Follow-up    Blood pressure medicine recently reduced    Diet Recall: fruits, nuts, chicken salad, boiled eggs    Physical Activity: limited, patient gets SOB very easily    Co-morbidities:   HTN  ILD  COPD  Lupus  Seizure  Stroke     Medical Weight Loss History  8/7/2020: 253.9 lbs, BMI 36.94, BFP 48%, SMM 73.6 lbs; Ozempic 0.25 mg weekly injections  9/8/2020: 245.6 lbs, BMI 35.8, BFP 47.4, SMM 72.1 lbs; Ozempic 0.5 mg weekly injections  10/8/2020: 240.4 lbs, BMI 35, BFP 45.9%, SMM 72.8 lbs; Ozempic 0.5 mg weekly injections      Review of Systems   Constitutional: Negative for chills and fever.   HENT: Negative for sore throat and trouble swallowing.    Eyes: Negative for visual disturbance.   Respiratory: Negative for shortness of breath.    Cardiovascular: Negative for chest pain and palpitations.   Gastrointestinal: Negative for abdominal pain, nausea and vomiting.   Integumentary:  Negative for rash.   Neurological: Negative for dizziness and light-headedness.   Psychiatric/Behavioral: The patient is not nervous/anxious.          Objective:       Weight 240.4 lbs  BMI 35  BFP 45.9%  SMM 72.8 lbs  BMR 1643 kcal    Vitals:    10/08/20 1047   BP: (!) 82/52   Pulse: 70       Physical Exam  Vitals signs reviewed.   Constitutional:       General: She is not in acute distress.     Appearance: Normal appearance. She is obese. She is not ill-appearing, toxic-appearing or diaphoretic.   HENT:      Head: Normocephalic and atraumatic.   Eyes:      Extraocular Movements: Extraocular movements intact.   Neck:      Musculoskeletal: Normal range of motion.   Cardiovascular:      Rate and Rhythm: Normal rate.   Pulmonary:      Effort: Pulmonary effort is normal. No respiratory distress.   Musculoskeletal: Normal range of motion.      Right lower leg: No edema.      Left lower leg: No edema.   Skin:     General: Skin is warm and dry.    Neurological:      General: No focal deficit present.      Mental Status: She is alert and oriented to person, place, and time.      Gait: Gait normal.   Psychiatric:         Mood and Affect: Mood normal.         Behavior: Behavior normal.         Thought Content: Thought content normal.         Judgment: Judgment normal.         Assessment:       1. Class 2 severe obesity due to excess calories with serious comorbidity and body mass index (BMI) of 35.0 to 35.9 in adult    2. Hypertension, unspecified type        Plan:   - Ozempic 0.5 mg weekly injections     - Log all food and beverage intake with a daily calorie goal of 1200 calories     - Seated resistance band exercises, diastasis recti exercises  given in AVS; recommended walk away the pounds exercise videos that she can do from home    - Return to clinic in 4 weeks

## 2020-11-09 ENCOUNTER — OFFICE VISIT (OUTPATIENT)
Dept: BARIATRICS | Facility: CLINIC | Age: 60
End: 2020-11-09
Payer: MEDICARE

## 2020-11-09 VITALS
BODY MASS INDEX: 35.84 KG/M2 | DIASTOLIC BLOOD PRESSURE: 82 MMHG | SYSTOLIC BLOOD PRESSURE: 124 MMHG | HEART RATE: 87 BPM | OXYGEN SATURATION: 98 % | WEIGHT: 242.69 LBS

## 2020-11-09 DIAGNOSIS — E66.01 CLASS 2 SEVERE OBESITY DUE TO EXCESS CALORIES WITH SERIOUS COMORBIDITY AND BODY MASS INDEX (BMI) OF 35.0 TO 35.9 IN ADULT: Primary | ICD-10-CM

## 2020-11-09 DIAGNOSIS — I10 HYPERTENSION, UNSPECIFIED TYPE: ICD-10-CM

## 2020-11-09 PROCEDURE — 99213 OFFICE O/P EST LOW 20 MIN: CPT | Mod: S$PBB,TXP,, | Performed by: STUDENT IN AN ORGANIZED HEALTH CARE EDUCATION/TRAINING PROGRAM

## 2020-11-09 PROCEDURE — 99214 OFFICE O/P EST MOD 30 MIN: CPT | Mod: PBBFAC,NTX | Performed by: STUDENT IN AN ORGANIZED HEALTH CARE EDUCATION/TRAINING PROGRAM

## 2020-11-09 PROCEDURE — 99999 PR PBB SHADOW E&M-EST. PATIENT-LVL IV: ICD-10-PCS | Mod: PBBFAC,TXP,, | Performed by: STUDENT IN AN ORGANIZED HEALTH CARE EDUCATION/TRAINING PROGRAM

## 2020-11-09 PROCEDURE — 99999 PR PBB SHADOW E&M-EST. PATIENT-LVL IV: CPT | Mod: PBBFAC,TXP,, | Performed by: STUDENT IN AN ORGANIZED HEALTH CARE EDUCATION/TRAINING PROGRAM

## 2020-11-09 PROCEDURE — 99213 PR OFFICE/OUTPT VISIT, EST, LEVL III, 20-29 MIN: ICD-10-PCS | Mod: S$PBB,TXP,, | Performed by: STUDENT IN AN ORGANIZED HEALTH CARE EDUCATION/TRAINING PROGRAM

## 2020-11-09 RX ORDER — SEMAGLUTIDE 1.34 MG/ML
0.5 INJECTION, SOLUTION SUBCUTANEOUS
Qty: 3 SYRINGE | Refills: 0 | Status: SHIPPED | OUTPATIENT
Start: 2020-11-09 | End: 2020-12-01

## 2020-11-09 NOTE — PATIENT INSTRUCTIONS
Meal Planning & Grocery Shopping    Meal planning builds the foundation for healthy eating. When you have structured ideas for healthy meals and foods available at home to prepare those meals, weight control becomes easier.  If only healthy foods are available at home, then you will be much more likely to eat healthy foods. And you will be less likely to go to a restaurant or  a fast food meal, which tend to be unhealthy and higher in calories than meals prepared at home.      Take 5-10 minutes each week to plan meals for the next 7 days.  Make a grocery list based on the meal plan.    Grocery Shopping Tips:  ? Shop on a full stomach.  ? Schedule your shopping for times when you are most motivated and able to be disciplined about your purchases. For example, after a stressful day at work it may be difficult to make the healthiest choices. Shopping at other times, such as early in the morning or after dinner, may be easier.  ? Focus your shopping on the outside aisles of the store, which tend to contain more fresh foods and lower calorie foods. The inside aisles tend to have more processed foods.  ? Stick to your list. Avoid buying unhealthy items just because they are on sale.   ? Compare nutrition labels to check the number of calories and percentage of fat.      What to buy:    Vegetables  - Fresh vegetables  - Frozen vegetables with no sauce or added salt  - Canned vegetables with no sauce or added salt    Protein  - Lean meats, such as chicken and turkey  - Limit red meats, such as beef to no more than 1x/week  - Limit processed meats, such as cold cuts, zavala, sausage, and hot dogs. Look for brands that have no nitrites and are minimally processed. Consider turkey sausage or turkey zavala.  - Fish and Shellfish  - Eggs  - Dried beans  - Canned beans (reduced sodium)    Fat  - Use healthy oils, such as olive oil or canola oil, for cooking, salad dressings, etc.  - Unflavored nuts and seeds  - Nut butters  (no added sugar)    Dairy  - Yogurt (no sugar added)  - Cheese  - Low-fat milk  - Unsweetened nondairy milks (almond milk, soy milk, etc)    Fruit  - Fresh Fruit  - Frozen fruit with no added sugar  - Canned fruit with no added sugar  - Dried fruit with no added sugar  - 100% fruit juice    Whole Grains  - Single ingredient grains, such as oats, quinoa, brown rice  - Whole-wheat pasta  - Sprouted whole-grain bread    What to avoid:  - Avoid fried foods  - Avoid foods with added sugar  - Avoid sugar-sweetened beverages  - Avoid ultra-processed foods
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
## 2020-12-07 ENCOUNTER — OFFICE VISIT (OUTPATIENT)
Dept: BARIATRICS | Facility: CLINIC | Age: 60
End: 2020-12-07
Payer: MEDICARE

## 2020-12-07 VITALS — WEIGHT: 237.69 LBS | DIASTOLIC BLOOD PRESSURE: 68 MMHG | SYSTOLIC BLOOD PRESSURE: 132 MMHG | BODY MASS INDEX: 35.1 KG/M2

## 2020-12-07 DIAGNOSIS — I10 HYPERTENSION, UNSPECIFIED TYPE: ICD-10-CM

## 2020-12-07 DIAGNOSIS — E66.01 CLASS 2 SEVERE OBESITY DUE TO EXCESS CALORIES WITH SERIOUS COMORBIDITY AND BODY MASS INDEX (BMI) OF 35.0 TO 35.9 IN ADULT: Primary | ICD-10-CM

## 2020-12-07 PROCEDURE — 99999 PR PBB SHADOW E&M-EST. PATIENT-LVL III: ICD-10-PCS | Mod: PBBFAC,TXP,, | Performed by: STUDENT IN AN ORGANIZED HEALTH CARE EDUCATION/TRAINING PROGRAM

## 2020-12-07 PROCEDURE — 99213 OFFICE O/P EST LOW 20 MIN: CPT | Mod: S$PBB,TXP,, | Performed by: STUDENT IN AN ORGANIZED HEALTH CARE EDUCATION/TRAINING PROGRAM

## 2020-12-07 PROCEDURE — 99999 PR PBB SHADOW E&M-EST. PATIENT-LVL III: CPT | Mod: PBBFAC,TXP,, | Performed by: STUDENT IN AN ORGANIZED HEALTH CARE EDUCATION/TRAINING PROGRAM

## 2020-12-07 PROCEDURE — 99213 PR OFFICE/OUTPT VISIT, EST, LEVL III, 20-29 MIN: ICD-10-PCS | Mod: S$PBB,TXP,, | Performed by: STUDENT IN AN ORGANIZED HEALTH CARE EDUCATION/TRAINING PROGRAM

## 2020-12-07 PROCEDURE — 99213 OFFICE O/P EST LOW 20 MIN: CPT | Mod: PBBFAC,TXP | Performed by: STUDENT IN AN ORGANIZED HEALTH CARE EDUCATION/TRAINING PROGRAM

## 2020-12-07 RX ORDER — SEMAGLUTIDE 1.34 MG/ML
0.5 INJECTION, SOLUTION SUBCUTANEOUS
Qty: 1.5 ML | Refills: 0 | Status: SHIPPED | OUTPATIENT
Start: 2020-12-07 | End: 2020-12-29

## 2020-12-07 NOTE — PROGRESS NOTES
Subjective:       Patient ID: Katy Vega is a 60 y.o. female.    Chief Complaint: Follow-up    Follow-up, appointment #5     Diet Recall: Gumbo, stuffed bell peppers, ice cream, smoothie, chicken noodle soup, broccoli and cheese soup, grilled cheese sandwich, crackers    Physical Activity: limited, patient gets SOB very easily     Co-morbidities:   HTN  ILD  COPD  Lupus  Seizure  Stroke     Medical Weight Loss History  8/7/2020: 253.9 lbs, BMI 36.94, BFP 48%, SMM 73.6 lbs; Ozempic 0.25 mg weekly injections  9/8/2020: 245.6 lbs, BMI 35.8, BFP 47.4, SMM 72.1 lbs; Ozempic 0.5 mg weekly injections  10/8/2020: 240.4 lbs, BMI 35, BFP 45.9%, SMM 72.8 lbs; Ozempic 0.5 mg weekly injections  11/9/2020: 242.7 lbs, BMI 35.3, BFP 44.8%, SMM 74.1 lbs; Ozempic 0.5 mg weekly injections   12/7/2020: 237.7 lbs, BMI 34.6, BFP 41.6 %, SMM 77.6 lbs; Ozempic 0.5 mg weekly injections      Review of Systems   Constitutional: Negative for chills and fever.   HENT: Negative for sore throat and trouble swallowing.    Eyes: Negative for visual disturbance.   Respiratory: Negative for shortness of breath.    Cardiovascular: Negative for chest pain and palpitations.   Gastrointestinal: Negative for abdominal pain, nausea and vomiting.   Integumentary:  Negative for rash.   Neurological: Negative for dizziness and light-headedness.   Psychiatric/Behavioral: The patient is not nervous/anxious.          Objective:       Weight 237.7 lbs  BMI 34.6  BFP 41.6 %  SMM 77.6 lbs  BMR 1731 kcal    Vitals:    12/07/20 1059   BP: 132/68       Physical Exam  Vitals signs reviewed.   Constitutional:       General: She is not in acute distress.     Appearance: Normal appearance. She is obese. She is not ill-appearing, toxic-appearing or diaphoretic.   HENT:      Head: Normocephalic and atraumatic.   Eyes:      Extraocular Movements: Extraocular movements intact.   Neck:      Musculoskeletal: Normal range of motion.   Cardiovascular:      Rate and  Rhythm: Normal rate.   Pulmonary:      Effort: Pulmonary effort is normal. No respiratory distress.   Musculoskeletal: Normal range of motion.      Right lower leg: No edema.      Left lower leg: No edema.   Skin:     General: Skin is warm and dry.   Neurological:      General: No focal deficit present.      Mental Status: She is alert and oriented to person, place, and time.      Gait: Gait normal.   Psychiatric:         Mood and Affect: Mood normal.         Behavior: Behavior normal.         Thought Content: Thought content normal.         Judgment: Judgment normal.         Assessment:       1. Class 2 severe obesity due to excess calories with serious comorbidity and body mass index (BMI) of 35.0 to 35.9 in adult    2. Hypertension, unspecified type        Plan:   - Ozempic 0.5 mg weekly injections     - Log all food and beverage intake with a daily calorie goal of 1200 calories     - Minimal activity this month, continue to encourage patient to move more     - Return to clinic in 4 weeks

## 2021-01-07 ENCOUNTER — OFFICE VISIT (OUTPATIENT)
Dept: BARIATRICS | Facility: CLINIC | Age: 61
End: 2021-01-07
Payer: MEDICARE

## 2021-01-07 VITALS
WEIGHT: 234.88 LBS | DIASTOLIC BLOOD PRESSURE: 78 MMHG | SYSTOLIC BLOOD PRESSURE: 142 MMHG | BODY MASS INDEX: 34.69 KG/M2

## 2021-01-07 DIAGNOSIS — I10 HYPERTENSION, UNSPECIFIED TYPE: ICD-10-CM

## 2021-01-07 DIAGNOSIS — E66.01 CLASS 2 SEVERE OBESITY DUE TO EXCESS CALORIES WITH SERIOUS COMORBIDITY AND BODY MASS INDEX (BMI) OF 35.0 TO 35.9 IN ADULT: Primary | ICD-10-CM

## 2021-01-07 PROCEDURE — 99999 PR PBB SHADOW E&M-EST. PATIENT-LVL III: ICD-10-PCS | Mod: PBBFAC,TXP,, | Performed by: STUDENT IN AN ORGANIZED HEALTH CARE EDUCATION/TRAINING PROGRAM

## 2021-01-07 PROCEDURE — 99213 PR OFFICE/OUTPT VISIT, EST, LEVL III, 20-29 MIN: ICD-10-PCS | Mod: S$PBB,TXP,, | Performed by: STUDENT IN AN ORGANIZED HEALTH CARE EDUCATION/TRAINING PROGRAM

## 2021-01-07 PROCEDURE — 99999 PR PBB SHADOW E&M-EST. PATIENT-LVL III: CPT | Mod: PBBFAC,TXP,, | Performed by: STUDENT IN AN ORGANIZED HEALTH CARE EDUCATION/TRAINING PROGRAM

## 2021-01-07 PROCEDURE — 99213 OFFICE O/P EST LOW 20 MIN: CPT | Mod: S$PBB,TXP,, | Performed by: STUDENT IN AN ORGANIZED HEALTH CARE EDUCATION/TRAINING PROGRAM

## 2021-01-07 PROCEDURE — 99213 OFFICE O/P EST LOW 20 MIN: CPT | Mod: PBBFAC,TXP | Performed by: STUDENT IN AN ORGANIZED HEALTH CARE EDUCATION/TRAINING PROGRAM

## 2021-01-07 RX ORDER — SEMAGLUTIDE 1.34 MG/ML
0.5 INJECTION, SOLUTION SUBCUTANEOUS
Qty: 1.5 ML | Refills: 0 | Status: SHIPPED | OUTPATIENT
Start: 2021-01-07 | End: 2021-01-29

## 2021-03-03 ENCOUNTER — OFFICE VISIT (OUTPATIENT)
Dept: BARIATRICS | Facility: CLINIC | Age: 61
End: 2021-03-03
Payer: MEDICARE

## 2021-03-03 VITALS
HEIGHT: 69 IN | DIASTOLIC BLOOD PRESSURE: 70 MMHG | SYSTOLIC BLOOD PRESSURE: 122 MMHG | WEIGHT: 233.88 LBS | BODY MASS INDEX: 34.64 KG/M2 | HEART RATE: 71 BPM | OXYGEN SATURATION: 99 %

## 2021-03-03 DIAGNOSIS — E66.01 CLASS 2 SEVERE OBESITY DUE TO EXCESS CALORIES WITH SERIOUS COMORBIDITY AND BODY MASS INDEX (BMI) OF 35.0 TO 35.9 IN ADULT: Primary | ICD-10-CM

## 2021-03-03 DIAGNOSIS — I10 HYPERTENSION, UNSPECIFIED TYPE: ICD-10-CM

## 2021-03-03 PROCEDURE — 99999 PR PBB SHADOW E&M-EST. PATIENT-LVL IV: ICD-10-PCS | Mod: PBBFAC,TXP,, | Performed by: STUDENT IN AN ORGANIZED HEALTH CARE EDUCATION/TRAINING PROGRAM

## 2021-03-03 PROCEDURE — 99214 OFFICE O/P EST MOD 30 MIN: CPT | Mod: PBBFAC,NTX | Performed by: STUDENT IN AN ORGANIZED HEALTH CARE EDUCATION/TRAINING PROGRAM

## 2021-03-03 PROCEDURE — 99213 PR OFFICE/OUTPT VISIT, EST, LEVL III, 20-29 MIN: ICD-10-PCS | Mod: S$PBB,TXP,, | Performed by: STUDENT IN AN ORGANIZED HEALTH CARE EDUCATION/TRAINING PROGRAM

## 2021-03-03 PROCEDURE — 99999 PR PBB SHADOW E&M-EST. PATIENT-LVL IV: CPT | Mod: PBBFAC,TXP,, | Performed by: STUDENT IN AN ORGANIZED HEALTH CARE EDUCATION/TRAINING PROGRAM

## 2021-03-03 PROCEDURE — 99213 OFFICE O/P EST LOW 20 MIN: CPT | Mod: S$PBB,TXP,, | Performed by: STUDENT IN AN ORGANIZED HEALTH CARE EDUCATION/TRAINING PROGRAM

## 2021-03-03 RX ORDER — METRONIDAZOLE 500 MG/1
TABLET ORAL
COMMUNITY
Start: 2020-12-02 | End: 2023-07-07

## 2021-03-03 RX ORDER — SEMAGLUTIDE 1.34 MG/ML
0.5 INJECTION, SOLUTION SUBCUTANEOUS
COMMUNITY
Start: 2020-09-17 | End: 2021-03-03 | Stop reason: DRUGHIGH

## 2021-03-03 RX ORDER — SEMAGLUTIDE 1.34 MG/ML
0.75 INJECTION, SOLUTION SUBCUTANEOUS
Qty: 2 SYRINGE | Refills: 2 | Status: SHIPPED | OUTPATIENT
Start: 2021-03-03 | End: 2021-08-16

## 2021-04-01 ENCOUNTER — OFFICE VISIT (OUTPATIENT)
Dept: BARIATRICS | Facility: CLINIC | Age: 61
End: 2021-04-01
Payer: MEDICARE

## 2021-04-01 VITALS
SYSTOLIC BLOOD PRESSURE: 122 MMHG | BODY MASS INDEX: 33.83 KG/M2 | WEIGHT: 228.38 LBS | HEART RATE: 69 BPM | HEIGHT: 69 IN | DIASTOLIC BLOOD PRESSURE: 68 MMHG | OXYGEN SATURATION: 98 %

## 2021-04-01 DIAGNOSIS — E66.9 CLASS 1 OBESITY WITH SERIOUS COMORBIDITY AND BODY MASS INDEX (BMI) OF 33.0 TO 33.9 IN ADULT, UNSPECIFIED OBESITY TYPE: Primary | ICD-10-CM

## 2021-04-01 DIAGNOSIS — I10 HYPERTENSION, UNSPECIFIED TYPE: ICD-10-CM

## 2021-04-01 PROBLEM — E66.811 CLASS 1 OBESITY WITH BODY MASS INDEX (BMI) OF 33.0 TO 33.9 IN ADULT: Status: ACTIVE | Noted: 2020-08-07

## 2021-04-01 PROCEDURE — 99999 PR PBB SHADOW E&M-EST. PATIENT-LVL IV: ICD-10-PCS | Mod: PBBFAC,TXP,, | Performed by: STUDENT IN AN ORGANIZED HEALTH CARE EDUCATION/TRAINING PROGRAM

## 2021-04-01 PROCEDURE — 99214 OFFICE O/P EST MOD 30 MIN: CPT | Mod: PBBFAC,TXP | Performed by: STUDENT IN AN ORGANIZED HEALTH CARE EDUCATION/TRAINING PROGRAM

## 2021-04-01 PROCEDURE — 99213 OFFICE O/P EST LOW 20 MIN: CPT | Mod: S$PBB,TXP,, | Performed by: STUDENT IN AN ORGANIZED HEALTH CARE EDUCATION/TRAINING PROGRAM

## 2021-04-01 PROCEDURE — 99213 PR OFFICE/OUTPT VISIT, EST, LEVL III, 20-29 MIN: ICD-10-PCS | Mod: S$PBB,TXP,, | Performed by: STUDENT IN AN ORGANIZED HEALTH CARE EDUCATION/TRAINING PROGRAM

## 2021-04-01 PROCEDURE — 99999 PR PBB SHADOW E&M-EST. PATIENT-LVL IV: CPT | Mod: PBBFAC,TXP,, | Performed by: STUDENT IN AN ORGANIZED HEALTH CARE EDUCATION/TRAINING PROGRAM

## 2021-04-01 RX ORDER — HYDROXYCHLOROQUINE SULFATE 200 MG/1
200 TABLET, FILM COATED ORAL
COMMUNITY
Start: 2021-03-29 | End: 2023-06-06

## 2021-04-01 RX ORDER — GABAPENTIN 300 MG/1
300 CAPSULE ORAL
COMMUNITY
Start: 2021-03-29 | End: 2021-10-14 | Stop reason: DRUGHIGH

## 2021-04-01 RX ORDER — DICLOFENAC SODIUM 10 MG/G
2 GEL TOPICAL
COMMUNITY
Start: 2021-03-29

## 2021-05-03 ENCOUNTER — OFFICE VISIT (OUTPATIENT)
Dept: BARIATRICS | Facility: CLINIC | Age: 61
End: 2021-05-03
Payer: MEDICARE

## 2021-05-03 VITALS
WEIGHT: 225.81 LBS | DIASTOLIC BLOOD PRESSURE: 60 MMHG | SYSTOLIC BLOOD PRESSURE: 128 MMHG | BODY MASS INDEX: 33.34 KG/M2 | HEART RATE: 71 BPM

## 2021-05-03 DIAGNOSIS — I10 HYPERTENSION, UNSPECIFIED TYPE: ICD-10-CM

## 2021-05-03 DIAGNOSIS — E66.9 CLASS 1 OBESITY WITH SERIOUS COMORBIDITY AND BODY MASS INDEX (BMI) OF 33.0 TO 33.9 IN ADULT, UNSPECIFIED OBESITY TYPE: Primary | ICD-10-CM

## 2021-05-03 PROCEDURE — 99999 PR PBB SHADOW E&M-EST. PATIENT-LVL III: ICD-10-PCS | Mod: PBBFAC,TXP,, | Performed by: STUDENT IN AN ORGANIZED HEALTH CARE EDUCATION/TRAINING PROGRAM

## 2021-05-03 PROCEDURE — 99213 OFFICE O/P EST LOW 20 MIN: CPT | Mod: S$PBB,TXP,, | Performed by: STUDENT IN AN ORGANIZED HEALTH CARE EDUCATION/TRAINING PROGRAM

## 2021-05-03 PROCEDURE — 99999 PR PBB SHADOW E&M-EST. PATIENT-LVL III: CPT | Mod: PBBFAC,TXP,, | Performed by: STUDENT IN AN ORGANIZED HEALTH CARE EDUCATION/TRAINING PROGRAM

## 2021-05-03 PROCEDURE — 99213 PR OFFICE/OUTPT VISIT, EST, LEVL III, 20-29 MIN: ICD-10-PCS | Mod: S$PBB,TXP,, | Performed by: STUDENT IN AN ORGANIZED HEALTH CARE EDUCATION/TRAINING PROGRAM

## 2021-05-03 PROCEDURE — 99213 OFFICE O/P EST LOW 20 MIN: CPT | Mod: PBBFAC,TXP | Performed by: STUDENT IN AN ORGANIZED HEALTH CARE EDUCATION/TRAINING PROGRAM

## 2021-06-07 ENCOUNTER — OFFICE VISIT (OUTPATIENT)
Dept: BARIATRICS | Facility: CLINIC | Age: 61
End: 2021-06-07
Payer: MEDICARE

## 2021-06-07 VITALS
OXYGEN SATURATION: 96 % | SYSTOLIC BLOOD PRESSURE: 102 MMHG | WEIGHT: 218.69 LBS | HEART RATE: 72 BPM | DIASTOLIC BLOOD PRESSURE: 62 MMHG | BODY MASS INDEX: 32.3 KG/M2

## 2021-06-07 DIAGNOSIS — E66.9 CLASS 1 OBESITY WITH SERIOUS COMORBIDITY AND BODY MASS INDEX (BMI) OF 32.0 TO 32.9 IN ADULT, UNSPECIFIED OBESITY TYPE: Primary | ICD-10-CM

## 2021-06-07 DIAGNOSIS — I10 HYPERTENSION, UNSPECIFIED TYPE: ICD-10-CM

## 2021-06-07 PROCEDURE — 99999 PR PBB SHADOW E&M-EST. PATIENT-LVL IV: CPT | Mod: PBBFAC,TXP,, | Performed by: STUDENT IN AN ORGANIZED HEALTH CARE EDUCATION/TRAINING PROGRAM

## 2021-06-07 PROCEDURE — 99214 OFFICE O/P EST MOD 30 MIN: CPT | Mod: PBBFAC,NTX | Performed by: STUDENT IN AN ORGANIZED HEALTH CARE EDUCATION/TRAINING PROGRAM

## 2021-06-07 PROCEDURE — 99213 PR OFFICE/OUTPT VISIT, EST, LEVL III, 20-29 MIN: ICD-10-PCS | Mod: S$PBB,TXP,, | Performed by: STUDENT IN AN ORGANIZED HEALTH CARE EDUCATION/TRAINING PROGRAM

## 2021-06-07 PROCEDURE — 99999 PR PBB SHADOW E&M-EST. PATIENT-LVL IV: ICD-10-PCS | Mod: PBBFAC,TXP,, | Performed by: STUDENT IN AN ORGANIZED HEALTH CARE EDUCATION/TRAINING PROGRAM

## 2021-06-07 PROCEDURE — 99213 OFFICE O/P EST LOW 20 MIN: CPT | Mod: S$PBB,TXP,, | Performed by: STUDENT IN AN ORGANIZED HEALTH CARE EDUCATION/TRAINING PROGRAM

## 2021-06-21 ENCOUNTER — TELEPHONE (OUTPATIENT)
Dept: BARIATRICS | Facility: CLINIC | Age: 61
End: 2021-06-21

## 2021-07-27 ENCOUNTER — TELEPHONE (OUTPATIENT)
Dept: TRANSPLANT | Facility: CLINIC | Age: 61
End: 2021-07-27

## 2021-07-27 DIAGNOSIS — J84.9 ILD (INTERSTITIAL LUNG DISEASE): Primary | ICD-10-CM

## 2021-07-27 DIAGNOSIS — M32.13 SYSTEMIC LUPUS ERYTHEMATOSUS WITH LUNG INVOLVEMENT, UNSPECIFIED SLE TYPE: ICD-10-CM

## 2021-08-16 ENCOUNTER — OFFICE VISIT (OUTPATIENT)
Dept: BARIATRICS | Facility: CLINIC | Age: 61
End: 2021-08-16
Payer: MEDICARE

## 2021-08-16 VITALS
WEIGHT: 220.88 LBS | HEART RATE: 64 BPM | SYSTOLIC BLOOD PRESSURE: 131 MMHG | OXYGEN SATURATION: 99 % | BODY MASS INDEX: 32.62 KG/M2 | DIASTOLIC BLOOD PRESSURE: 64 MMHG

## 2021-08-16 DIAGNOSIS — E66.09 CLASS 1 OBESITY DUE TO EXCESS CALORIES WITH SERIOUS COMORBIDITY AND BODY MASS INDEX (BMI) OF 32.0 TO 32.9 IN ADULT: Primary | ICD-10-CM

## 2021-08-16 DIAGNOSIS — I10 HYPERTENSION, UNSPECIFIED TYPE: ICD-10-CM

## 2021-08-16 PROCEDURE — 99999 PR PBB SHADOW E&M-EST. PATIENT-LVL IV: ICD-10-PCS | Mod: PBBFAC,TXP,, | Performed by: STUDENT IN AN ORGANIZED HEALTH CARE EDUCATION/TRAINING PROGRAM

## 2021-08-16 PROCEDURE — 99214 OFFICE O/P EST MOD 30 MIN: CPT | Mod: PBBFAC,TXP | Performed by: STUDENT IN AN ORGANIZED HEALTH CARE EDUCATION/TRAINING PROGRAM

## 2021-08-16 PROCEDURE — 99213 OFFICE O/P EST LOW 20 MIN: CPT | Mod: S$PBB,TXP,, | Performed by: STUDENT IN AN ORGANIZED HEALTH CARE EDUCATION/TRAINING PROGRAM

## 2021-08-16 PROCEDURE — 99999 PR PBB SHADOW E&M-EST. PATIENT-LVL IV: CPT | Mod: PBBFAC,TXP,, | Performed by: STUDENT IN AN ORGANIZED HEALTH CARE EDUCATION/TRAINING PROGRAM

## 2021-08-16 PROCEDURE — 99213 PR OFFICE/OUTPT VISIT, EST, LEVL III, 20-29 MIN: ICD-10-PCS | Mod: S$PBB,TXP,, | Performed by: STUDENT IN AN ORGANIZED HEALTH CARE EDUCATION/TRAINING PROGRAM

## 2021-08-16 RX ORDER — SEMAGLUTIDE 1.34 MG/ML
1 INJECTION, SOLUTION SUBCUTANEOUS
Qty: 1 PEN | Refills: 2 | Status: SHIPPED | OUTPATIENT
Start: 2021-08-16 | End: 2021-09-15 | Stop reason: SDUPTHER

## 2021-09-01 ENCOUNTER — TELEPHONE (OUTPATIENT)
Dept: TRANSPLANT | Facility: CLINIC | Age: 61
End: 2021-09-01

## 2021-09-15 ENCOUNTER — TELEPHONE (OUTPATIENT)
Dept: BARIATRICS | Facility: CLINIC | Age: 61
End: 2021-09-15

## 2021-09-15 RX ORDER — SEMAGLUTIDE 1.34 MG/ML
1 INJECTION, SOLUTION SUBCUTANEOUS
Qty: 1 PEN | Refills: 2 | Status: SHIPPED | OUTPATIENT
Start: 2021-09-15 | End: 2021-10-18 | Stop reason: SDUPTHER

## 2021-09-16 ENCOUNTER — TELEPHONE (OUTPATIENT)
Dept: TRANSPLANT | Facility: CLINIC | Age: 61
End: 2021-09-16

## 2021-09-16 DIAGNOSIS — M32.13 SYSTEMIC LUPUS ERYTHEMATOSUS WITH LUNG INVOLVEMENT, UNSPECIFIED SLE TYPE: ICD-10-CM

## 2021-09-16 DIAGNOSIS — Z11.52 ENCOUNTER FOR SCREENING LABORATORY TESTING FOR COVID-19 VIRUS IN ASYMPTOMATIC PATIENT: Primary | ICD-10-CM

## 2021-09-16 DIAGNOSIS — Z01.812 ENCOUNTER FOR SCREENING LABORATORY TESTING FOR COVID-19 VIRUS IN ASYMPTOMATIC PATIENT: Primary | ICD-10-CM

## 2021-09-16 DIAGNOSIS — J84.9 ILD (INTERSTITIAL LUNG DISEASE): ICD-10-CM

## 2021-10-14 ENCOUNTER — HOSPITAL ENCOUNTER (OUTPATIENT)
Dept: PULMONOLOGY | Facility: CLINIC | Age: 61
Discharge: HOME | End: 2021-10-14
Payer: MEDICARE

## 2021-10-14 ENCOUNTER — OFFICE VISIT (OUTPATIENT)
Dept: TRANSPLANT | Facility: CLINIC | Age: 61
End: 2021-10-14
Payer: MEDICARE

## 2021-10-14 VITALS
HEIGHT: 69 IN | HEART RATE: 78 BPM | OXYGEN SATURATION: 99 % | BODY MASS INDEX: 33.31 KG/M2 | TEMPERATURE: 98 F | RESPIRATION RATE: 20 BRPM | WEIGHT: 224.88 LBS | DIASTOLIC BLOOD PRESSURE: 77 MMHG | SYSTOLIC BLOOD PRESSURE: 165 MMHG

## 2021-10-14 VITALS — HEIGHT: 69 IN | WEIGHT: 224.63 LBS | BODY MASS INDEX: 33.27 KG/M2

## 2021-10-14 DIAGNOSIS — J84.9 ILD (INTERSTITIAL LUNG DISEASE): Primary | ICD-10-CM

## 2021-10-14 DIAGNOSIS — M32.13 SYSTEMIC LUPUS ERYTHEMATOSUS WITH LUNG INVOLVEMENT, UNSPECIFIED SLE TYPE: ICD-10-CM

## 2021-10-14 DIAGNOSIS — J84.9 ILD (INTERSTITIAL LUNG DISEASE): ICD-10-CM

## 2021-10-14 DIAGNOSIS — E66.9 OBESITY (BMI 30-39.9): ICD-10-CM

## 2021-10-14 DIAGNOSIS — C50.919 MALIGNANT NEOPLASM OF FEMALE BREAST, UNSPECIFIED ESTROGEN RECEPTOR STATUS, UNSPECIFIED LATERALITY, UNSPECIFIED SITE OF BREAST: ICD-10-CM

## 2021-10-14 DIAGNOSIS — Z13.9 SCREENING PROCEDURE: Primary | ICD-10-CM

## 2021-10-14 LAB
FEF 25 75 LLN: 0.95
FEF 25 75 PRE REF: 63.7 %
FEF 25 75 REF: 2.21
FEV05 LLN: 1.23
FEV05 REF: 2.08
FEV1 FVC LLN: 67
FEV1 FVC PRE REF: 99.9 %
FEV1 FVC REF: 79
FEV1 LLN: 1.83
FEV1 PRE REF: 56.2 %
FEV1 REF: 2.51
FVC LLN: 2.36
FVC PRE REF: 55.8 %
FVC REF: 3.2
PEF LLN: 4.12
PEF PRE REF: 53.2 %
PEF REF: 6.45
PHYSICIAN COMMENT: ABNORMAL
PRE FEF 25 75: 1.41 L/S (ref 0.95–4.01)
PRE FET 100: 6.81 SEC
PRE FEV05 REF: 54.7 %
PRE FEV1 FVC: 78.97 % (ref 67.43–89.05)
PRE FEV1: 1.41 L (ref 1.83–3.17)
PRE FEV5: 1.14 L (ref 1.23–2.94)
PRE FVC: 1.79 L (ref 2.36–4.08)
PRE PEF: 3.43 L/S (ref 4.12–8.78)
SARS-COV-2 RDRP RESP QL NAA+PROBE: NEGATIVE

## 2021-10-14 PROCEDURE — 94010 BREATHING CAPACITY TEST: CPT | Mod: 26,S$PBB,NTX, | Performed by: INTERNAL MEDICINE

## 2021-10-14 PROCEDURE — U0002 COVID-19 LAB TEST NON-CDC: HCPCS | Mod: TXP | Performed by: INTERNAL MEDICINE

## 2021-10-14 PROCEDURE — 99999 PR PBB SHADOW E&M-EST. PATIENT-LVL IV: ICD-10-PCS | Mod: PBBFAC,TXP,, | Performed by: PHYSICIAN ASSISTANT

## 2021-10-14 PROCEDURE — 94010 BREATHING CAPACITY TEST: CPT | Mod: PBBFAC,NTX | Performed by: INTERNAL MEDICINE

## 2021-10-14 PROCEDURE — 94618 PULMONARY STRESS TESTING: ICD-10-PCS | Mod: 26,S$PBB,NTX, | Performed by: INTERNAL MEDICINE

## 2021-10-14 PROCEDURE — 99214 OFFICE O/P EST MOD 30 MIN: CPT | Mod: PBBFAC,TXP | Performed by: PHYSICIAN ASSISTANT

## 2021-10-14 PROCEDURE — 99215 PR OFFICE/OUTPT VISIT, EST, LEVL V, 40-54 MIN: ICD-10-PCS | Mod: 25,S$PBB,NTX, | Performed by: PHYSICIAN ASSISTANT

## 2021-10-14 PROCEDURE — 94618 PULMONARY STRESS TESTING: CPT | Mod: PBBFAC,NTX | Performed by: INTERNAL MEDICINE

## 2021-10-14 PROCEDURE — 94618 PULMONARY STRESS TESTING: CPT | Mod: 26,S$PBB,NTX, | Performed by: INTERNAL MEDICINE

## 2021-10-14 PROCEDURE — 99999 PR PBB SHADOW E&M-EST. PATIENT-LVL IV: CPT | Mod: PBBFAC,TXP,, | Performed by: PHYSICIAN ASSISTANT

## 2021-10-14 PROCEDURE — 99215 OFFICE O/P EST HI 40 MIN: CPT | Mod: 25,S$PBB,NTX, | Performed by: PHYSICIAN ASSISTANT

## 2021-10-14 PROCEDURE — 94010 BREATHING CAPACITY TEST: ICD-10-PCS | Mod: 26,S$PBB,NTX, | Performed by: INTERNAL MEDICINE

## 2021-10-14 RX ORDER — SILVER SULFADIAZINE 10 G/1000G
1 CREAM TOPICAL
COMMUNITY

## 2021-10-18 RX ORDER — SEMAGLUTIDE 1.34 MG/ML
1 INJECTION, SOLUTION SUBCUTANEOUS
Qty: 1 PEN | Refills: 2 | Status: SHIPPED | OUTPATIENT
Start: 2021-10-18 | End: 2021-11-18 | Stop reason: SDUPTHER

## 2021-11-18 ENCOUNTER — OFFICE VISIT (OUTPATIENT)
Dept: BARIATRICS | Facility: CLINIC | Age: 61
End: 2021-11-18
Payer: MEDICARE

## 2021-11-18 VITALS
DIASTOLIC BLOOD PRESSURE: 64 MMHG | WEIGHT: 213.19 LBS | OXYGEN SATURATION: 96 % | BODY MASS INDEX: 31.48 KG/M2 | SYSTOLIC BLOOD PRESSURE: 128 MMHG | HEART RATE: 71 BPM

## 2021-11-18 DIAGNOSIS — E66.09 CLASS 1 OBESITY DUE TO EXCESS CALORIES WITH SERIOUS COMORBIDITY AND BODY MASS INDEX (BMI) OF 31.0 TO 31.9 IN ADULT: Primary | ICD-10-CM

## 2021-11-18 DIAGNOSIS — Z71.3 ENCOUNTER FOR WEIGHT LOSS COUNSELING: ICD-10-CM

## 2021-11-18 DIAGNOSIS — I10 PRIMARY HYPERTENSION: ICD-10-CM

## 2021-11-18 PROCEDURE — 99999 PR PBB SHADOW E&M-EST. PATIENT-LVL III: CPT | Mod: PBBFAC,TXP,, | Performed by: STUDENT IN AN ORGANIZED HEALTH CARE EDUCATION/TRAINING PROGRAM

## 2021-11-18 PROCEDURE — 99213 OFFICE O/P EST LOW 20 MIN: CPT | Mod: PBBFAC,NTX | Performed by: STUDENT IN AN ORGANIZED HEALTH CARE EDUCATION/TRAINING PROGRAM

## 2021-11-18 PROCEDURE — 99999 PR PBB SHADOW E&M-EST. PATIENT-LVL III: ICD-10-PCS | Mod: PBBFAC,TXP,, | Performed by: STUDENT IN AN ORGANIZED HEALTH CARE EDUCATION/TRAINING PROGRAM

## 2021-11-18 PROCEDURE — 99213 PR OFFICE/OUTPT VISIT, EST, LEVL III, 20-29 MIN: ICD-10-PCS | Mod: S$PBB,TXP,, | Performed by: STUDENT IN AN ORGANIZED HEALTH CARE EDUCATION/TRAINING PROGRAM

## 2021-11-18 PROCEDURE — 99213 OFFICE O/P EST LOW 20 MIN: CPT | Mod: S$PBB,TXP,, | Performed by: STUDENT IN AN ORGANIZED HEALTH CARE EDUCATION/TRAINING PROGRAM

## 2021-11-18 RX ORDER — SEMAGLUTIDE 1.34 MG/ML
1 INJECTION, SOLUTION SUBCUTANEOUS
Qty: 3 PEN | Refills: 0 | Status: SHIPPED | OUTPATIENT
Start: 2021-11-18 | End: 2022-02-04

## 2022-01-03 ENCOUNTER — OFFICE VISIT (OUTPATIENT)
Dept: BARIATRICS | Facility: CLINIC | Age: 62
End: 2022-01-03
Payer: MEDICARE

## 2022-01-03 VITALS
OXYGEN SATURATION: 98 % | HEART RATE: 78 BPM | DIASTOLIC BLOOD PRESSURE: 80 MMHG | BODY MASS INDEX: 31.61 KG/M2 | WEIGHT: 214.06 LBS | SYSTOLIC BLOOD PRESSURE: 136 MMHG

## 2022-01-03 DIAGNOSIS — Z71.3 ENCOUNTER FOR WEIGHT LOSS COUNSELING: ICD-10-CM

## 2022-01-03 DIAGNOSIS — I10 PRIMARY HYPERTENSION: ICD-10-CM

## 2022-01-03 DIAGNOSIS — E66.09 CLASS 1 OBESITY DUE TO EXCESS CALORIES WITH SERIOUS COMORBIDITY AND BODY MASS INDEX (BMI) OF 31.0 TO 31.9 IN ADULT: Primary | ICD-10-CM

## 2022-01-03 PROCEDURE — 99213 OFFICE O/P EST LOW 20 MIN: CPT | Mod: S$PBB,TXP,, | Performed by: STUDENT IN AN ORGANIZED HEALTH CARE EDUCATION/TRAINING PROGRAM

## 2022-01-03 PROCEDURE — 99214 OFFICE O/P EST MOD 30 MIN: CPT | Mod: PBBFAC,NTX | Performed by: STUDENT IN AN ORGANIZED HEALTH CARE EDUCATION/TRAINING PROGRAM

## 2022-01-03 PROCEDURE — 99999 PR PBB SHADOW E&M-EST. PATIENT-LVL IV: CPT | Mod: PBBFAC,TXP,, | Performed by: STUDENT IN AN ORGANIZED HEALTH CARE EDUCATION/TRAINING PROGRAM

## 2022-01-03 PROCEDURE — 99999 PR PBB SHADOW E&M-EST. PATIENT-LVL IV: ICD-10-PCS | Mod: PBBFAC,TXP,, | Performed by: STUDENT IN AN ORGANIZED HEALTH CARE EDUCATION/TRAINING PROGRAM

## 2022-01-03 PROCEDURE — 99213 PR OFFICE/OUTPT VISIT, EST, LEVL III, 20-29 MIN: ICD-10-PCS | Mod: S$PBB,TXP,, | Performed by: STUDENT IN AN ORGANIZED HEALTH CARE EDUCATION/TRAINING PROGRAM

## 2022-01-03 NOTE — PROGRESS NOTES
Subjective:       Patient ID: Katy Vega is a 61 y.o. female.    Chief Complaint: No chief complaint on file.    Patient presents for treatment of obesity.  Follow-up, appointment #13    Co-morbidities:   HTN  ILD  COPD  Lupus  Seizure  Stroke    Diet Recall:    [place], salads, rotisserie chicken, asparagus, boiled crabs, fruit  Overeating during the holidays - Eggnog, pie   Eating out - ribs, boiled shrimp, soup    Physical Activity:   2 hours 2x/week at [employer] (treadmill, exercise bike, weights)  Has not been going regularly due to lymphedema, booster shot pain       Medical Weight Loss History  8/7/2020: 253.9 lbs, BMI 36.94, BFP 48%, SMM 73.6 lbs; Ozempic 0.25 mg weekly injections  9/8/2020: 245.6 lbs, BMI 35.8, BFP 47.4, SMM 72.1 lbs; Ozempic 0.5 mg weekly injections  10/8/2020: 240.4 lbs, BMI 35, BFP 45.9%, SMM 72.8 lbs; Ozempic 0.5 mg weekly injections  11/9/2020: 242.7 lbs, BMI 35.3, BFP 44.8%, SMM 74.1 lbs; Ozempic 0.5 mg weekly injections   12/7/2020: 237.7 lbs, BMI 34.6, BFP 41.6 %, SMM 77.6 lbs; Ozempic 0.5 mg weekly injections  1/7/2021: 234.9 lbs, BMI 34.2, BFP 47.6%, SMM 68.1 lbs; Ozempic 0.5 mg weekly injections  3/3/2021: 233.9 lbs, BMI 34.1, BFP 45.3%, SMM 69.7 lbs; Ozempic 1.0 mg weekly injections  4/1/2021: 228.3 lbs, BMI 33.2, BFP 42.1%, SMM 73 lbs; Ozempic 1.0 mg weekly injections  5/3/2021: 225.8 lbs, BMI 32.9, BFP 43.9%, SMM 69.9 lbs; Ozempic 1.0 mg weekly injections  6/7/2021: 218.7 lbs, BMI 31.8, BFP 42.7%, SMM 69.2 lbs; Ozempic 1.0 mg weekly injections  8/16/2021: 220.9 lbs, BMI 32.2, BFP 43.1%, SMM 68.6 lbs; Ozempic 1.0 mg weekly injections  11/18/2021: 213.2 lbs, BMI 31, BFP 42.5%, SMM 67.5 lbs; Ozempic 1.0 mg weekly injections  1/3/2022 ([employer] out for repair): 214 lbs 1.1 oz, BMI 31.61; Ozempic    Follow-up  Pertinent negatives include no abdominal pain, chest pain, chills, fever, nausea, rash, sore throat or vomiting.     Review of Systems   Constitutional: Negative for  chills and fever.   HENT: Negative for sore throat and trouble swallowing.    Eyes: Negative for visual disturbance.   Respiratory: Negative for shortness of breath.    Cardiovascular: Negative for chest pain and palpitations.   Gastrointestinal: Negative for abdominal pain, nausea and vomiting.   Integumentary:  Negative for rash.   Neurological: Negative for dizziness and light-headedness.   Psychiatric/Behavioral: The patient is not nervous/anxious.          Objective:         Vitals:    01/03/22 1126   BP: 136/80   Pulse: 78       Physical Exam  Vitals reviewed.   Constitutional:       General: She is not in acute distress.     Appearance: Normal appearance. She is obese. She is not ill-appearing, toxic-appearing or diaphoretic.   HENT:      Head: Normocephalic and atraumatic.   Eyes:      Extraocular Movements: Extraocular movements intact.   Cardiovascular:      Rate and Rhythm: Normal rate.   Pulmonary:      Effort: Pulmonary effort is normal. No respiratory distress.   Musculoskeletal:         General: Normal range of motion.      Cervical back: Normal range of motion.      Right lower leg: No edema.      Left lower leg: No edema.   Skin:     General: Skin is warm and dry.   Neurological:      General: No focal deficit present.      Mental Status: She is alert and oriented to person, place, and time.      Gait: Gait normal.   Psychiatric:         Mood and Affect: Mood normal.         Behavior: Behavior normal.         Thought Content: Thought content normal.         Judgment: Judgment normal.         Assessment:       1. Class 1 obesity due to excess calories with serious comorbidity and body mass index (BMI) of 31.0 to 31.9 in adult    2. Primary hypertension    3. Encounter for weight loss counseling        Plan:   - Ozempic 1.0 mg weekly injections     - Log all food and beverage intake with a daily calorie goal of 1200 calories     - [facility] 2x/week     - Return to clinic in 4  weeks

## 2022-02-04 ENCOUNTER — TELEPHONE (OUTPATIENT)
Dept: BARIATRICS | Facility: CLINIC | Age: 62
End: 2022-02-04
Payer: MEDICARE

## 2022-02-04 NOTE — TELEPHONE ENCOUNTER
----- Message from Jaylene Landon sent at 2/4/2022  8:07 AM CST -----  Regarding: WEATHER - RESCHEUDLE  Contact: Self  Pt stated due to the weather and not feeling well she need to reschedule. Pt is requesting for 02/17/2022 if possible however can come any day except 02/11 and 02/15/2022 due to having another appointment please Next available appointment 3/2/2022 Pt stated if she cannot get an appointment anytime this month that she will need a refill on her medication Pt ask for a call      Contact info  [phone] (home)

## 2022-02-16 ENCOUNTER — OFFICE VISIT (OUTPATIENT)
Dept: BARIATRICS | Facility: CLINIC | Age: 62
End: 2022-02-16
Payer: MEDICARE

## 2022-02-16 VITALS
DIASTOLIC BLOOD PRESSURE: 66 MMHG | BODY MASS INDEX: 32.02 KG/M2 | WEIGHT: 216.19 LBS | HEIGHT: 69 IN | SYSTOLIC BLOOD PRESSURE: 102 MMHG | HEART RATE: 61 BPM | OXYGEN SATURATION: 99 %

## 2022-02-16 DIAGNOSIS — I10 PRIMARY HYPERTENSION: ICD-10-CM

## 2022-02-16 DIAGNOSIS — Z71.3 ENCOUNTER FOR WEIGHT LOSS COUNSELING: ICD-10-CM

## 2022-02-16 DIAGNOSIS — E66.09 CLASS 1 OBESITY DUE TO EXCESS CALORIES WITH SERIOUS COMORBIDITY AND BODY MASS INDEX (BMI) OF 31.0 TO 31.9 IN ADULT: Primary | ICD-10-CM

## 2022-02-16 PROCEDURE — 99999 PR PBB SHADOW E&M-EST. PATIENT-LVL IV: CPT | Mod: PBBFAC,TXP,, | Performed by: STUDENT IN AN ORGANIZED HEALTH CARE EDUCATION/TRAINING PROGRAM

## 2022-02-16 PROCEDURE — 99214 OFFICE O/P EST MOD 30 MIN: CPT | Mod: PBBFAC,TXP | Performed by: STUDENT IN AN ORGANIZED HEALTH CARE EDUCATION/TRAINING PROGRAM

## 2022-02-16 PROCEDURE — 99999 PR PBB SHADOW E&M-EST. PATIENT-LVL IV: ICD-10-PCS | Mod: PBBFAC,TXP,, | Performed by: STUDENT IN AN ORGANIZED HEALTH CARE EDUCATION/TRAINING PROGRAM

## 2022-02-16 PROCEDURE — 99213 PR OFFICE/OUTPT VISIT, EST, LEVL III, 20-29 MIN: ICD-10-PCS | Mod: S$PBB,TXP,, | Performed by: STUDENT IN AN ORGANIZED HEALTH CARE EDUCATION/TRAINING PROGRAM

## 2022-02-16 PROCEDURE — 99213 OFFICE O/P EST LOW 20 MIN: CPT | Mod: S$PBB,TXP,, | Performed by: STUDENT IN AN ORGANIZED HEALTH CARE EDUCATION/TRAINING PROGRAM

## 2022-02-16 RX ORDER — SEMAGLUTIDE 1.34 MG/ML
1 INJECTION, SOLUTION SUBCUTANEOUS
Qty: 1 PEN | Refills: 2 | Status: SHIPPED | OUTPATIENT
Start: 2022-02-16 | End: 2022-03-23 | Stop reason: SDUPTHER

## 2022-02-16 NOTE — PROGRESS NOTES
Subjective:       Patient ID: Katy Veag is a 61 y.o. female.    Chief Complaint: Follow-up, Obesity, and Weight Check    Patient presents for treatment of obesity.  Follow-up, appointment #14    Co-morbidities:   HTN  ILD  COPD  Lupus  Seizure  Stroke      Physical Activity:   2 hours 2x/week at [employer] (treadmill, exercise bike, weights)  Has not been going regularly due to lymphedema, booster shot pain  Going to lymphedema therapy    Diet Recall:    [place], salads, rotisserie chicken, asparagus, boiled crabs, fruit   Eating out often - ribs, boiled shrimp, soup  Smoothies  Fast food - 3 days over past 2 weeks  Peanut butter sandwiches  Mini bagels  Raisin bread    Medical Weight Loss History  8/7/2020: 253.9 lbs, BMI 36.94, BFP 48%, SMM 73.6 lbs; Ozempic 0.25 mg weekly injections  9/8/2020: 245.6 lbs, BMI 35.8, BFP 47.4, SMM 72.1 lbs; Ozempic 0.5 mg weekly injections  10/8/2020: 240.4 lbs, BMI 35, BFP 45.9%, SMM 72.8 lbs; Ozempic 0.5 mg weekly injections  11/9/2020: 242.7 lbs, BMI 35.3, BFP 44.8%, SMM 74.1 lbs; Ozempic 0.5 mg weekly injections   12/7/2020: 237.7 lbs, BMI 34.6, BFP 41.6 %, SMM 77.6 lbs; Ozempic 0.5 mg weekly injections  1/7/2021: 234.9 lbs, BMI 34.2, BFP 47.6%, SMM 68.1 lbs; Ozempic 0.5 mg weekly injections  3/3/2021: 233.9 lbs, BMI 34.1, BFP 45.3%, SMM 69.7 lbs; Ozempic 1.0 mg weekly injections  4/1/2021: 228.3 lbs, BMI 33.2, BFP 42.1%, SMM 73 lbs; Ozempic 1.0 mg weekly injections  5/3/2021: 225.8 lbs, BMI 32.9, BFP 43.9%, SMM 69.9 lbs; Ozempic 1.0 mg weekly injections  6/7/2021: 218.7 lbs, BMI 31.8, BFP 42.7%, SMM 69.2 lbs; Ozempic 1.0 mg weekly injections  8/16/2021: 220.9 lbs, BMI 32.2, BFP 43.1%, SMM 68.6 lbs; Ozempic 1.0 mg weekly injections  11/18/2021: 213.2 lbs, BMI 31, BFP 42.5%, SMM 67.5 lbs; Ozempic 1.0 mg weekly injections  1/3/2022 ([employer] out for repair): 214 lbs 1.1 oz, BMI 31.61; Ozempic  2/16/2022: 216.2 lbs, BMI 31.5, BFP 42.2%, BFM 91.2, SMM 68.6 lbs, BMR 1595 kcal;  Ozempic. Has been out of medicine for 2 weeks      Follow-up  Pertinent negatives include no abdominal pain, chest pain, chills, fever, nausea, rash, sore throat or vomiting.     Review of Systems   Constitutional: Negative for chills and fever.   HENT: Negative for sore throat and trouble swallowing.    Eyes: Negative for visual disturbance.   Respiratory: Negative for shortness of breath.    Cardiovascular: Negative for chest pain and palpitations.   Gastrointestinal: Negative for abdominal pain, nausea and vomiting.   Integumentary:  Negative for rash.   Neurological: Negative for dizziness and light-headedness.   Psychiatric/Behavioral: The patient is not nervous/anxious.          Objective:         Vitals:    02/16/22 0826   BP: 102/66   Pulse: 61       Physical Exam  Vitals reviewed.   Constitutional:       General: She is not in acute distress.     Appearance: Normal appearance. She is obese. She is not ill-appearing, toxic-appearing or diaphoretic.   HENT:      Head: Normocephalic and atraumatic.   Eyes:      Extraocular Movements: Extraocular movements intact.   Cardiovascular:      Rate and Rhythm: Normal rate.   Pulmonary:      Effort: Pulmonary effort is normal. No respiratory distress.   Musculoskeletal:         General: Normal range of motion.      Cervical back: Normal range of motion.      Right lower leg: No edema.      Left lower leg: No edema.   Skin:     General: Skin is warm and dry.   Neurological:      General: No focal deficit present.      Mental Status: She is alert and oriented to person, place, and time.      Gait: Gait normal.   Psychiatric:         Mood and Affect: Mood normal.         Behavior: Behavior normal.         Thought Content: Thought content normal.         Judgment: Judgment normal.         Assessment:       1. Class 1 obesity due to excess calories with serious comorbidity and body mass index (BMI) of 31.0 to 31.9 in adult    2. Primary hypertension    3. Encounter for  weight loss counseling        Plan:   - Ozempic 1.0 mg weekly injections     - Log all food and beverage intake with a daily calorie goal of 1200 calories     - [facility] 2x/week     - Return to clinic in 4 weeks

## 2022-03-23 ENCOUNTER — OFFICE VISIT (OUTPATIENT)
Dept: BARIATRICS | Facility: CLINIC | Age: 62
End: 2022-03-23
Payer: MEDICARE

## 2022-03-23 VITALS
DIASTOLIC BLOOD PRESSURE: 74 MMHG | SYSTOLIC BLOOD PRESSURE: 122 MMHG | OXYGEN SATURATION: 99 % | HEART RATE: 87 BPM | WEIGHT: 214.13 LBS | BODY MASS INDEX: 31.62 KG/M2

## 2022-03-23 DIAGNOSIS — E66.09 CLASS 1 OBESITY DUE TO EXCESS CALORIES WITH SERIOUS COMORBIDITY AND BODY MASS INDEX (BMI) OF 31.0 TO 31.9 IN ADULT: Primary | ICD-10-CM

## 2022-03-23 DIAGNOSIS — I10 PRIMARY HYPERTENSION: ICD-10-CM

## 2022-03-23 DIAGNOSIS — Z71.3 ENCOUNTER FOR WEIGHT LOSS COUNSELING: ICD-10-CM

## 2022-03-23 PROCEDURE — 99214 OFFICE O/P EST MOD 30 MIN: CPT | Mod: PBBFAC,TXP | Performed by: STUDENT IN AN ORGANIZED HEALTH CARE EDUCATION/TRAINING PROGRAM

## 2022-03-23 PROCEDURE — 99999 PR PBB SHADOW E&M-EST. PATIENT-LVL IV: ICD-10-PCS | Mod: PBBFAC,TXP,, | Performed by: STUDENT IN AN ORGANIZED HEALTH CARE EDUCATION/TRAINING PROGRAM

## 2022-03-23 PROCEDURE — 99213 OFFICE O/P EST LOW 20 MIN: CPT | Mod: S$PBB,TXP,, | Performed by: STUDENT IN AN ORGANIZED HEALTH CARE EDUCATION/TRAINING PROGRAM

## 2022-03-23 PROCEDURE — 99999 PR PBB SHADOW E&M-EST. PATIENT-LVL IV: CPT | Mod: PBBFAC,TXP,, | Performed by: STUDENT IN AN ORGANIZED HEALTH CARE EDUCATION/TRAINING PROGRAM

## 2022-03-23 PROCEDURE — 99213 PR OFFICE/OUTPT VISIT, EST, LEVL III, 20-29 MIN: ICD-10-PCS | Mod: S$PBB,TXP,, | Performed by: STUDENT IN AN ORGANIZED HEALTH CARE EDUCATION/TRAINING PROGRAM

## 2022-03-23 RX ORDER — SEMAGLUTIDE 1.34 MG/ML
1 INJECTION, SOLUTION SUBCUTANEOUS
Qty: 1 PEN | Refills: 2 | Status: SHIPPED | OUTPATIENT
Start: 2022-03-23 | End: 2022-06-09

## 2022-03-23 NOTE — PROGRESS NOTES
Subjective:       Patient ID: Katy Vega is a 61 y.o. female.    Chief Complaint: Follow-up, Obesity, and Weight Check    Patient presents for treatment of obesity.  Follow-up, appointment #15    Co-morbidities:   HTN  ILD  COPD  Lupus  Seizure  Stroke      Physical Activity:   2 hours 2x/week at [employer] (treadmill, exercise bike, weights)  Has not been going regularly due to lymphedema, booster shot pain  Going to lymphedema therapy    Diet Recall:    [place] sandwich on wheat bread  Baked beans  Granola bars  Boiled egg  Salad  Hot tea    Avoiding buffets  Avoiding cakes and pies    Having surgery to remove scar tissue in breast     Medical Weight Loss History  8/7/2020: 253.9 lbs, BMI 36.94, BFP 48%, SMM 73.6 lbs; Ozempic 0.25 mg weekly injections  9/8/2020: 245.6 lbs, BMI 35.8, BFP 47.4, SMM 72.1 lbs; Ozempic 0.5 mg weekly injections  10/8/2020: 240.4 lbs, BMI 35, BFP 45.9%, SMM 72.8 lbs; Ozempic 0.5 mg weekly injections  11/9/2020: 242.7 lbs, BMI 35.3, BFP 44.8%, SMM 74.1 lbs; Ozempic 0.5 mg weekly injections   12/7/2020: 237.7 lbs, BMI 34.6, BFP 41.6 %, SMM 77.6 lbs; Ozempic 0.5 mg weekly injections  1/7/2021: 234.9 lbs, BMI 34.2, BFP 47.6%, SMM 68.1 lbs; Ozempic 0.5 mg weekly injections  3/3/2021: 233.9 lbs, BMI 34.1, BFP 45.3%, SMM 69.7 lbs; Ozempic 1.0 mg weekly injections  4/1/2021: 228.3 lbs, BMI 33.2, BFP 42.1%, SMM 73 lbs; Ozempic 1.0 mg weekly injections  5/3/2021: 225.8 lbs, BMI 32.9, BFP 43.9%, SMM 69.9 lbs; Ozempic 1.0 mg weekly injections  6/7/2021: 218.7 lbs, BMI 31.8, BFP 42.7%, SMM 69.2 lbs; Ozempic 1.0 mg weekly injections  8/16/2021: 220.9 lbs, BMI 32.2, BFP 43.1%, SMM 68.6 lbs; Ozempic 1.0 mg weekly injections  11/18/2021: 213.2 lbs, BMI 31, BFP 42.5%, SMM 67.5 lbs; Ozempic 1.0 mg weekly injections  1/3/2022 ([employer] out for repair): 214 lbs 1.1 oz, BMI 31.61; Ozempic  2/16/2022: 216.2 lbs, BMI 31.5, BFP 42.2%, BFM 91.2, SMM 68.6 lbs, BMR 1595 kcal; Ozempic. Has been out of medicine for  2 weeks  3/23/2022: 214.1 lbs, BMI 31.2, BFP 39.8%, BFM 85.1 lbs, SMM 71.2 lbs, BMR 1634 kcal; Ozempic      Follow-up  Pertinent negatives include no abdominal pain, chest pain, chills, fever, nausea, rash, sore throat or vomiting.     Review of Systems   Constitutional: Negative for chills and fever.   HENT: Negative for sore throat and trouble swallowing.    Eyes: Negative for visual disturbance.   Respiratory: Negative for shortness of breath.    Cardiovascular: Negative for chest pain and palpitations.   Gastrointestinal: Negative for abdominal pain, nausea and vomiting.   Integumentary:  Negative for rash.   Neurological: Negative for dizziness and light-headedness.   Psychiatric/Behavioral: The patient is not nervous/anxious.          Objective:        Latest Reference Range & Units 06/08/20 09:30   Cholesterol <200 mg/dL 136 (E)   HDL 40 - 59 mg/dL 41 (E)   HDL/Cholesterol Ratio 0.00 - 4.40  3.32 (E)   LDL Calculated <130 mg/dL 83 (E)   Non-HDL Cholesterol <160  95 (E)   Triglycerides <150 mg/dL 58 (E)   Hemoglobin A1C External 4.7 - 5.6 % 5.1 (E)   Estimated Avg Glucose <115 mg/dL 100 (E)   (E): External lab result    Vitals:    03/23/22 1019   BP: 122/74   Pulse: 87       Physical Exam  Vitals reviewed.   Constitutional:       General: She is not in acute distress.     Appearance: Normal appearance. She is obese. She is not ill-appearing, toxic-appearing or diaphoretic.   HENT:      Head: Normocephalic and atraumatic.   Eyes:      Extraocular Movements: Extraocular movements intact.   Cardiovascular:      Rate and Rhythm: Normal rate.   Pulmonary:      Effort: Pulmonary effort is normal. No respiratory distress.   Musculoskeletal:         General: Normal range of motion.      Cervical back: Normal range of motion.      Right lower leg: No edema.      Left lower leg: No edema.   Skin:     General: Skin is warm and dry.   Neurological:      General: No focal deficit present.      Mental Status: She is alert  and oriented to person, place, and time.      Gait: Gait normal.   Psychiatric:         Mood and Affect: Mood normal.         Behavior: Behavior normal.         Thought Content: Thought content normal.         Judgment: Judgment normal.         Assessment:       1. Class 1 obesity due to excess calories with serious comorbidity and body mass index (BMI) of 31.0 to 31.9 in adult    2. Primary hypertension    3. Encounter for weight loss counseling        Plan:   - Ozempic 1.0 mg weekly injections     - Log all food and beverage intake with a daily calorie goal of 1200 calories     - [facility] 2x/week     - Return to clinic in 4 weeks

## 2022-05-11 DIAGNOSIS — J84.9 ILD (INTERSTITIAL LUNG DISEASE): Primary | ICD-10-CM

## 2022-06-24 ENCOUNTER — TELEPHONE (OUTPATIENT)
Dept: TRANSPLANT | Facility: CLINIC | Age: 62
End: 2022-06-24
Payer: MEDICARE

## 2022-06-28 ENCOUNTER — HOSPITAL ENCOUNTER (OUTPATIENT)
Dept: PULMONOLOGY | Facility: CLINIC | Age: 62
Discharge: HOME | End: 2022-06-28
Payer: MEDICARE

## 2022-06-28 ENCOUNTER — OFFICE VISIT (OUTPATIENT)
Dept: TRANSPLANT | Facility: CLINIC | Age: 62
End: 2022-06-28
Payer: MEDICARE

## 2022-06-28 VITALS
RESPIRATION RATE: 20 BRPM | HEART RATE: 60 BPM | TEMPERATURE: 98 F | OXYGEN SATURATION: 100 % | SYSTOLIC BLOOD PRESSURE: 145 MMHG | DIASTOLIC BLOOD PRESSURE: 65 MMHG | WEIGHT: 220.44 LBS | BODY MASS INDEX: 32.65 KG/M2 | HEIGHT: 69 IN

## 2022-06-28 VITALS — HEIGHT: 70 IN | WEIGHT: 215 LBS | BODY MASS INDEX: 30.78 KG/M2

## 2022-06-28 DIAGNOSIS — Z76.82 LUNG TRANSPLANT CANDIDATE: ICD-10-CM

## 2022-06-28 DIAGNOSIS — J84.9 ILD (INTERSTITIAL LUNG DISEASE): ICD-10-CM

## 2022-06-28 DIAGNOSIS — M35.1 MIXED CONNECTIVE TISSUE DISEASE: ICD-10-CM

## 2022-06-28 DIAGNOSIS — E66.9 OBESITY (BMI 30-39.9): ICD-10-CM

## 2022-06-28 DIAGNOSIS — C50.919 MALIGNANT NEOPLASM OF FEMALE BREAST, UNSPECIFIED ESTROGEN RECEPTOR STATUS, UNSPECIFIED LATERALITY, UNSPECIFIED SITE OF BREAST: ICD-10-CM

## 2022-06-28 PROCEDURE — 99999 PR PBB SHADOW E&M-EST. PATIENT-LVL IV: ICD-10-PCS | Mod: PBBFAC,TXP,, | Performed by: INTERNAL MEDICINE

## 2022-06-28 PROCEDURE — 94618 PULMONARY STRESS TESTING: ICD-10-PCS | Mod: 26,S$PBB,NTX, | Performed by: INTERNAL MEDICINE

## 2022-06-28 PROCEDURE — 94729 DIFFUSING CAPACITY: CPT | Mod: 26,S$PBB,NTX, | Performed by: INTERNAL MEDICINE

## 2022-06-28 PROCEDURE — 94010 BREATHING CAPACITY TEST: ICD-10-PCS | Mod: 26,S$PBB,NTX, | Performed by: INTERNAL MEDICINE

## 2022-06-28 PROCEDURE — 94010 BREATHING CAPACITY TEST: CPT | Mod: PBBFAC,NTX | Performed by: INTERNAL MEDICINE

## 2022-06-28 PROCEDURE — 94618 PULMONARY STRESS TESTING: CPT | Mod: PBBFAC,NTX | Performed by: INTERNAL MEDICINE

## 2022-06-28 PROCEDURE — 94729 PR C02/MEMBANE DIFFUSE CAPACITY: ICD-10-PCS | Mod: 26,S$PBB,NTX, | Performed by: INTERNAL MEDICINE

## 2022-06-28 PROCEDURE — 99214 OFFICE O/P EST MOD 30 MIN: CPT | Mod: 25,S$PBB,TXP, | Performed by: INTERNAL MEDICINE

## 2022-06-28 PROCEDURE — 94010 BREATHING CAPACITY TEST: CPT | Mod: 26,S$PBB,NTX, | Performed by: INTERNAL MEDICINE

## 2022-06-28 PROCEDURE — 94729 DIFFUSING CAPACITY: CPT | Mod: PBBFAC,NTX | Performed by: INTERNAL MEDICINE

## 2022-06-28 PROCEDURE — 99214 OFFICE O/P EST MOD 30 MIN: CPT | Mod: PBBFAC,NTX | Performed by: INTERNAL MEDICINE

## 2022-06-28 PROCEDURE — 99999 PR PBB SHADOW E&M-EST. PATIENT-LVL IV: CPT | Mod: PBBFAC,TXP,, | Performed by: INTERNAL MEDICINE

## 2022-06-28 PROCEDURE — 94727 PR PULM FUNCTION TEST BY GAS: ICD-10-PCS | Mod: 26,S$PBB,NTX, | Performed by: INTERNAL MEDICINE

## 2022-06-28 PROCEDURE — 94727 GAS DIL/WSHOT DETER LNG VOL: CPT | Mod: 26,S$PBB,NTX, | Performed by: INTERNAL MEDICINE

## 2022-06-28 PROCEDURE — 94727 GAS DIL/WSHOT DETER LNG VOL: CPT | Mod: PBBFAC,NTX | Performed by: INTERNAL MEDICINE

## 2022-06-28 PROCEDURE — 99214 PR OFFICE/OUTPT VISIT, EST, LEVL IV, 30-39 MIN: ICD-10-PCS | Mod: 25,S$PBB,TXP, | Performed by: INTERNAL MEDICINE

## 2022-06-28 PROCEDURE — 94618 PULMONARY STRESS TESTING: CPT | Mod: 26,S$PBB,NTX, | Performed by: INTERNAL MEDICINE

## 2022-06-28 RX ORDER — SEMAGLUTIDE 1.34 MG/ML
INJECTION, SOLUTION SUBCUTANEOUS
COMMUNITY
End: 2022-10-04

## 2022-06-28 NOTE — LETTER
June 28, 2022        Nishith M. Mewada  [address]  [facility] [address]  Phone: [phone]  Fax: [phone]             [facility] Trans[facility] 1st Fl  [address]  [facility] [address]  Phone: [phone]   Patient: Katy Vega   MR Number: [id number]   YOB: 1960   Date of Visit: 6/28/2022       Dear Dr. Nishith M. Mewada    Thank you for referring Katy Vega to me for evaluation. Attached you will find relevant portions of my assessment and plan of care.    If you have questions, please do not hesitate to call me. I look forward to following Katy Vega along with you.    Sincerely,    Evelin Aaron MD    Enclosure    If you would like to receive this communication electronically, please contact [email or web address] or [phone] to request [facility] access.    [facility] is a tool which provides read-only access to select patient information with whom you have a relationship. Its easy to use and provides real time access to review your patients record including encounter summaries, notes, results, and demographic information.    If you feel you have received this communication in error or would no longer like to receive these types of communications, please e-mail [email or web address]

## 2022-06-28 NOTE — PROGRESS NOTES
LUNG TRANSPLANT PRE FOLLOW-UP    Referring Physician: Nishith M. 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 She is on no assisted ventilation.  Her [place] Heart Association Class is II and a Karnofsky score of 80% - Normal activity with effort: some symptoms of disease. She is not diabetic.     Patient presents today for routine follow up for MCTD-ILD.  Since her last visit, she has been doing well. She is disappointed that she is unable to loose weight.  She is fairly active and tries to workout most days except for recently stopping it due to lymphedema from her breast cancer's ramifications.  She is following up with bariatric surgery.  She will be following up with for a breast reconstruction at [facility].  Denies any other concerns.  She is tolerating her medications well.      Brief history referring to HPI from July 2019  Patient reports LEPE for years starting in [id number].  At that time, she also noticed increasing fatigue with daily activities.  She was first evaluated at [facility] in 2006 when she evacuated after [place].  She states at that time she was diagnosed with COPD and ILD.  Further work-up showed positive autoimmune serologies and she was diagnosed with Lupus.  She was started on Plaquenil and Prednisone.  She then had intermittent follow-up until she moved back to [place] in 2012.  At that time, she established with Rheumatology at  and was again started on Plaquenil and Prednisone which she remained on until a few months ago.  She then began following with [facility] at [facility].  CT  chest showed evidence of GGOs and fibrosis which have been stable on imaging.  They noticed a decrease in her FVC and referred her to [facility] who have since stopped the Prednisone, continued her on Plaquenil, and added MMF.  She has been unable to tolerate more than 1500mg per day due to GI side effects.  Since starting Prednisone years ago, she noticed an increased weight gain of approximately 80-90 lbs.  She has lost 15lbs since stopping.  Remains with LEPE but attempts to go to the gym and rides the stationary bike.  She does not require supplemental oxygen and does not have evidence of PH.  She currently takes Symbicort and prn Albuterol for her COPD.  Does not have exacerbations or require outpatient steroids/abx.     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  She feels she has negligible mild cognitive impairment and gait abnormality.  She has been seizure free for >7 years. She weaned off anti-epileptics on her own.    - Breast cancer about 3 years ago s/p lympectomy (L) with partial masectomy .       She is currently on workers comp following a fall at work which led to a back injury requiring surgery and pain management.  She previously worked as a .  She is  with her  dying from lung cancer in .  Her 2 brothers and father also  of lung cancer and she has had 2 sisters die of breast cancer.  She is UTD on mammograms and had a right axillary LN biopsy last year which was negative.  She is a never smoker but has extensive second hand exposure.  She does not drink or use illicits.     Review of Systems   Constitutional: Negative for chills, diaphoresis, fever, malaise/fatigue and weight loss.   HENT: Negative for congestion, ear discharge, ear pain, hearing loss, nosebleeds, sinus pain, sore throat and tinnitus.    Eyes: Negative for blurred vision, double vision, photophobia, pain,  "discharge and redness.   Respiratory: Positive for shortness of breath. Negative for cough, hemoptysis, sputum production, wheezing and stridor.    Cardiovascular: Negative for chest pain, palpitations, orthopnea, claudication, leg swelling and PND.   Gastrointestinal: Negative for abdominal pain, blood in stool, constipation, diarrhea, heartburn, melena, nausea and vomiting.   Genitourinary: Negative for dysuria, flank pain, frequency, hematuria and urgency.   Musculoskeletal: Negative for back pain, falls, joint pain, myalgias and neck pain.   Skin: Negative for itching and rash.   Neurological: Negative for dizziness, tingling, tremors, sensory change, speech change, focal weakness, seizures, loss of consciousness, weakness and headaches.   Endo/Heme/Allergies: Negative for environmental allergies and polydipsia. Does not bruise/bleed easily.   Psychiatric/Behavioral: Positive for memory loss. Negative for depression, hallucinations, substance abuse and suicidal ideas. The patient is not nervous/anxious and does not have insomnia.      Objective:   BP (!) 145/65   Pulse 60   Temp 97.9 °F (36.6 °C) (Oral)   Resp 20   Ht 5' 9" (1.753 m)   Wt 100 kg (220 lb 7.4 oz)   LMP  (LMP Unknown)   SpO2 100% Comment: room air  BMI 32.56 kg/m²   Physical Exam  Constitutional:       Appearance: Normal appearance. She is well-developed.   HENT:      Head: Normocephalic and atraumatic.   Eyes:      Conjunctiva/sclera: Conjunctivae normal.      Pupils: Pupils are equal, round, and reactive to light.   Cardiovascular:      Rate and Rhythm: Normal rate and regular rhythm.      Heart sounds: Normal heart sounds.   Pulmonary:      Effort: Pulmonary effort is normal.      Breath sounds: Normal breath sounds.   Abdominal:      General: Bowel sounds are normal.      Palpations: Abdomen is soft.   Musculoskeletal:         General: Normal range of motion.      Cervical back: Normal range of motion and neck supple.   Skin:     " General: Skin is warm and dry.   Neurological:      Mental Status: She is alert and oriented to person, place, and time.       Labs:    Lab Visit on 09/14/2020   Component Date Value    SARS-CoV2 (COVID-19) [place]* 09/14/2020 Not Detected        Pulmonary Function Tests 6/28/2022 10/14/2021 9/17/2020 7/30/2019   FVC 1.87 1.79 1.53 1.6   FEV1 1.46 1.41 1.25 1.24   TLC (liters) 2.95 - - 2.54   DLCO (ml/mmHg sec) 12.68 - 18.8 14.6   FVC% 59 55.8 47 45   FEV1% 59 56.2 49 44   FEF 25-75 1.31 1.41 - -   FEF 25-75% 60 63.7 - -   TLC% 51 - - 43   RV - - - 1.03   RV% - - - 46   DLCO% 49 - 72 71     6MW 6/28/2022 10/14/2021 9/17/2020 7/30/2019   6MWT Status completed with stops completed without stopping completed without stopping completed without stopping   Patient Reported Lightheadedness;Leg pain;Dyspnea Dyspnea;Leg pain;Lightheadedness Dyspnea;Dizziness Lightheadedness;Dyspnea   Was O2 used? No No No No   6MW Distance walked (feet) 1100 1300 1200 1277   Distance walked (meters) 335.28 396.24 365.76 389.23   Did patient stop? Yes No No No   Oxygen Saturation 99 99 96 99   Supplemental Oxygen Room Air Room Air Room Air Room Air   Heart Rate 61 62 74 81   Blood Pressure 131/60 138/66 128/66 144/67   Tee Dyspnea Rating  somewhat heavy moderate somewhat heavy somewhat heavy   Oxygen Saturation 100 96 98 90   Supplemental Oxygen Room Air Room Air Room Air Room Air   Heart Rate 66 85 94 79   Blood Pressure 144/65 158/72 129/59 161/64   Tee Dyspnea Rating  heavy heavy very heavy very heavy   Recovery Time (seconds) 120 125 74 137   Oxygen Saturation 100 99 97 98   Supplemental Oxygen Room Air Room Air Room Air Room Air   Heart Rate 63 72 69 68     TTE 04/18/2022        Assessment:-  1. Lung transplant candidate    2. ILD (interstitial lung disease)    3. Mixed connective tissue disease    4. Malignant neoplasm of female breast, unspecified estrogen receptor status, unspecified laterality, unspecified site of breast    5. Obesity  (BMI 30-39.9)      Plan:   - Lung reserve appears to be stable to improved compared to prior studies.  Comparative DLCO show a decline which is likely due to effort vs. Advanced lung disease.  Good walk distance without desaturation.  Overall clinically doing well and robust respiratory status.  She remains early for consideration for lung transplant workup.  Current barrier for lung transplant is her < 5 year disease free period from breast cancer free interval.        - Doing well on plaquenil and MMF at 1500 mg BID with rituxan every 6 months at [facility].  Follow up with primary pulmonologist and rheumatologist as previously scheduled.     - Breast cancer ( L Breast DCIS grade III, ER -/MI-) s/p L breast segmental mastectomy and lymphectomy.  Patient will need to be 5 years cancer free prior to consideration for lung transplant workup.     - Follows up with neurologist.  And they are award of her stopping her anti-epileptics      RTC in 18 months.    Evelin Thomas MD  Lung transplant and Advanced lung disease  Pulmonary/Critical Care Medicine  [facility]  6/28/2022

## 2022-06-28 NOTE — PROCEDURES
Katy Vega is a 62 y.o.  female patient, who presents for a 6 minute walk test ordered by MD Agnieszka.  The diagnosis is Shortness of Breath.  The patient's BMI is 30.8 kg/m2.  Predicted distance (lower limit of normal) is 330.18 meters.      Test Results:    The test was completed with stops.  The patient stopped 1 times for a total of 33 seconds.  The total time walked was 327 seconds.  During walking, the patient reported:  Lightheadedness, Leg pain, Dyspnea. The patient used no assistive devices  during testing.     06/28/2022---------Distance: 335.28 meters (1100 feet)     O2 Sat % Supplemental Oxygen Heart Rate Blood Pressure Tee Scale   Pre-exercise  (Resting) 99 % Room Air 61 bpm 131/60 mmHg 4   During Exercise 100 % Room Air 66 bpm 144/65 mmHg 5-6   Post-exercise  (Recovery) 100 % Room Air  63 bpm   mmHg       Recovery Time: 120 seconds    Performing nurse/tech: Rex GUEVARA      PREVIOUS STUDY:   The patient had a previous study.     10/14/2021---------Distance: 396.24 meters (1300 feet)       O2 Sat % Supplemental Oxygen Heart Rate Blood Pressure Tee Scale   Pre-exercise  (Resting) 99 % Room Air 62 bpm 138/66 mmHg 3   During Exercise 96 % Room Air 85 bpm 158/72 mmHg 5-6   Post-exercise  (Recovery) 99 % Room Air  72 bpm              CLINICAL INTERPRETATION:  Six minute walk distance is 335.28 meters (1100 feet) with heavy dyspnea.  During exercise, there was no significant desaturation while breathing room air.  Both blood pressure and heart rate remained stable with walking.  The patient reported non-pulmonary symptoms during exercise.  The patient did complete the study, walking 327 seconds of the 360 second test.  Since the previous study in October 2021, exercise capacity is unchanged.  Based upon age and body mass index, exercise capacity is normal.

## 2022-09-28 ENCOUNTER — TELEPHONE (OUTPATIENT)
Dept: BARIATRICS | Facility: CLINIC | Age: 62
End: 2022-09-28
Payer: MEDICARE

## 2022-09-28 NOTE — TELEPHONE ENCOUNTER
----- Message from Ling Olievra sent at 9/28/2022 11:24 AM CDT -----  Regarding: schedule  Contact: [phone]  Request Appt      Appt Type: F/u  Call Back Number:[phone]  Additional Information:

## 2022-10-04 ENCOUNTER — OFFICE VISIT (OUTPATIENT)
Dept: BARIATRICS | Facility: CLINIC | Age: 62
End: 2022-10-04
Payer: MEDICARE

## 2022-10-04 VITALS
SYSTOLIC BLOOD PRESSURE: 142 MMHG | BODY MASS INDEX: 32.15 KG/M2 | OXYGEN SATURATION: 100 % | DIASTOLIC BLOOD PRESSURE: 65 MMHG | HEART RATE: 64 BPM | WEIGHT: 217.69 LBS

## 2022-10-04 DIAGNOSIS — I10 PRIMARY HYPERTENSION: ICD-10-CM

## 2022-10-04 DIAGNOSIS — E66.09 CLASS 1 OBESITY DUE TO EXCESS CALORIES WITH SERIOUS COMORBIDITY AND BODY MASS INDEX (BMI) OF 32.0 TO 32.9 IN ADULT: Primary | ICD-10-CM

## 2022-10-04 DIAGNOSIS — Z71.3 ENCOUNTER FOR WEIGHT LOSS COUNSELING: ICD-10-CM

## 2022-10-04 PROCEDURE — 99999 PR PBB SHADOW E&M-EST. PATIENT-LVL III: ICD-10-PCS | Mod: PBBFAC,TXP,, | Performed by: STUDENT IN AN ORGANIZED HEALTH CARE EDUCATION/TRAINING PROGRAM

## 2022-10-04 PROCEDURE — 99999 PR PBB SHADOW E&M-EST. PATIENT-LVL III: CPT | Mod: PBBFAC,TXP,, | Performed by: STUDENT IN AN ORGANIZED HEALTH CARE EDUCATION/TRAINING PROGRAM

## 2022-10-04 PROCEDURE — 99213 OFFICE O/P EST LOW 20 MIN: CPT | Mod: S$PBB,TXP,, | Performed by: STUDENT IN AN ORGANIZED HEALTH CARE EDUCATION/TRAINING PROGRAM

## 2022-10-04 PROCEDURE — 99213 PR OFFICE/OUTPT VISIT, EST, LEVL III, 20-29 MIN: ICD-10-PCS | Mod: S$PBB,TXP,, | Performed by: STUDENT IN AN ORGANIZED HEALTH CARE EDUCATION/TRAINING PROGRAM

## 2022-10-04 PROCEDURE — 99213 OFFICE O/P EST LOW 20 MIN: CPT | Mod: PBBFAC,NTX | Performed by: STUDENT IN AN ORGANIZED HEALTH CARE EDUCATION/TRAINING PROGRAM

## 2022-10-04 RX ORDER — SEMAGLUTIDE 1.34 MG/ML
0.5 INJECTION, SOLUTION SUBCUTANEOUS
Qty: 1 PEN | Refills: 0 | Status: SHIPPED | OUTPATIENT
Start: 2022-10-04 | End: 2022-10-26

## 2022-10-04 NOTE — PROGRESS NOTES
Subjective:       Patient ID: Katy Vega is a 62 y.o. female.    Chief Complaint: Follow-up, Obesity, and Weight Check    Patient presents for treatment of obesity.      Co-morbidities:   HTN  ILD  COPD  Lupus  Seizure  Stroke      Physical Activity:   2 hours 2x/week at [employer] (treadmill, exercise bike, weights)  Has not been going regularly due to lymphedema, booster shot pain  Going to lymphedema therapy    Diet Recall:    Hamburgers, steaks, pork chops, chicken  Salads    Has been eating anything for past couple months since breast surgery    Medical Weight Loss History  8/7/2020: 253.9 lbs, BMI 36.94, BFP 48%, SMM 73.6 lbs; Ozempic 0.25 mg weekly injections  9/8/2020: 245.6 lbs, BMI 35.8, BFP 47.4, SMM 72.1 lbs; Ozempic 0.5 mg weekly injections  10/8/2020: 240.4 lbs, BMI 35, BFP 45.9%, SMM 72.8 lbs; Ozempic 0.5 mg weekly injections  11/9/2020: 242.7 lbs, BMI 35.3, BFP 44.8%, SMM 74.1 lbs; Ozempic 0.5 mg weekly injections   12/7/2020: 237.7 lbs, BMI 34.6, BFP 41.6 %, SMM 77.6 lbs; Ozempic 0.5 mg weekly injections  1/7/2021: 234.9 lbs, BMI 34.2, BFP 47.6%, SMM 68.1 lbs; Ozempic 0.5 mg weekly injections  3/3/2021: 233.9 lbs, BMI 34.1, BFP 45.3%, SMM 69.7 lbs; Ozempic 1.0 mg weekly injections  4/1/2021: 228.3 lbs, BMI 33.2, BFP 42.1%, SMM 73 lbs; Ozempic 1.0 mg weekly injections  5/3/2021: 225.8 lbs, BMI 32.9, BFP 43.9%, SMM 69.9 lbs; Ozempic 1.0 mg weekly injections  6/7/2021: 218.7 lbs, BMI 31.8, BFP 42.7%, SMM 69.2 lbs; Ozempic 1.0 mg weekly injections  8/16/2021: 220.9 lbs, BMI 32.2, BFP 43.1%, SMM 68.6 lbs; Ozempic 1.0 mg weekly injections  11/18/2021: 213.2 lbs, BMI 31, BFP 42.5%, SMM 67.5 lbs; Ozempic 1.0 mg weekly injections  1/3/2022 ([employer] out for repair): 214 lbs 1.1 oz, BMI 31.61; Ozempic  2/16/2022: 216.2 lbs, BMI 31.5, BFP 42.2%, BFM 91.2, SMM 68.6 lbs, BMR 1595 kcal; Ozempic. Has been out of medicine for 2 weeks  3/23/2022: 214.1 lbs, BMI 31.2, BFP 39.8%, BFM 85.1 lbs, SMM 71.2 lbs, BMR  1634 kcal; Ozempic  10/4/2022: 217.7 lbs, BMI 31.7, BFP 40.7%, BFM 88.7 lbs, SMM 70.8 lbs, BMR 1634 kcal      Follow-up  Pertinent negatives include no abdominal pain, chest pain, chills, fever, nausea, rash, sore throat or vomiting.   Review of Systems   Constitutional:  Negative for chills and fever.   HENT:  Negative for sore throat and trouble swallowing.    Eyes:  Negative for visual disturbance.   Respiratory:  Negative for shortness of breath.    Cardiovascular:  Negative for chest pain and palpitations.   Gastrointestinal:  Negative for abdominal pain, nausea and vomiting.   Integumentary:  Negative for rash.   Neurological:  Negative for dizziness and light-headedness.   Psychiatric/Behavioral:  The patient is not nervous/anxious.        Objective:        Latest Reference Range & Units 06/08/20 09:30   Cholesterol <200 mg/dL 136 (E)   HDL 40 - 59 mg/dL 41 (E)   HDL/Cholesterol Ratio 0.00 - 4.40  3.32 (E)   LDL Calculated <130 mg/dL 83 (E)   Non-HDL Cholesterol <160  95 (E)   Triglycerides <150 mg/dL 58 (E)   Hemoglobin A1C External 4.7 - 5.6 % 5.1 (E)   Estimated Avg Glucose <115 mg/dL 100 (E)   (E): External lab result    Vitals:    10/04/22 1433   BP: (!) 142/65   Pulse: 64         Physical Exam  Vitals reviewed.   Constitutional:       General: She is not in acute distress.     Appearance: Normal appearance. She is obese. She is not ill-appearing, toxic-appearing or diaphoretic.   HENT:      Head: Normocephalic and atraumatic.   Eyes:      Extraocular Movements: Extraocular movements intact.   Cardiovascular:      Rate and Rhythm: Normal rate.   Pulmonary:      Effort: Pulmonary effort is normal. No respiratory distress.   Musculoskeletal:         General: Normal range of motion.      Cervical back: Normal range of motion.      Right lower leg: No edema.      Left lower leg: No edema.   Skin:     General: Skin is warm and dry.   Neurological:      General: No focal deficit present.      Mental Status:  She is alert and oriented to person, place, and time.      Gait: Gait normal.   Psychiatric:         Mood and Affect: Mood normal.         Behavior: Behavior normal.         Thought Content: Thought content normal.         Judgment: Judgment normal.       Assessment:       1. Class 1 obesity due to excess calories with serious comorbidity and body mass index (BMI) of 32.0 to 32.9 in adult    2. Primary hypertension    3. Encounter for weight loss counseling          Plan:   - Ozempic 0.5 mg for 4 weeks, then 1.0 mg weekly injections     - Log all food and beverage intake with a daily calorie goal of 1200 calories     - Low intensity aerobic activity for 30 minutes 3-5x/week     - Return to clinic in 4 weeks

## 2022-12-05 ENCOUNTER — OFFICE VISIT (OUTPATIENT)
Dept: BARIATRICS | Facility: CLINIC | Age: 62
End: 2022-12-05
Payer: MEDICARE

## 2022-12-05 VITALS
BODY MASS INDEX: 30.09 KG/M2 | SYSTOLIC BLOOD PRESSURE: 136 MMHG | WEIGHT: 203.13 LBS | HEIGHT: 69 IN | HEART RATE: 79 BPM | DIASTOLIC BLOOD PRESSURE: 65 MMHG

## 2022-12-05 DIAGNOSIS — I10 PRIMARY HYPERTENSION: ICD-10-CM

## 2022-12-05 DIAGNOSIS — E66.3 OVERWEIGHT WITH BODY MASS INDEX (BMI) OF 29 TO 29.9 IN ADULT: Primary | ICD-10-CM

## 2022-12-05 DIAGNOSIS — Z71.3 ENCOUNTER FOR WEIGHT LOSS COUNSELING: ICD-10-CM

## 2022-12-05 PROCEDURE — 99999 PR PBB SHADOW E&M-EST. PATIENT-LVL IV: CPT | Mod: PBBFAC,TXP,, | Performed by: STUDENT IN AN ORGANIZED HEALTH CARE EDUCATION/TRAINING PROGRAM

## 2022-12-05 PROCEDURE — 99213 OFFICE O/P EST LOW 20 MIN: CPT | Mod: S$PBB,TXP,, | Performed by: STUDENT IN AN ORGANIZED HEALTH CARE EDUCATION/TRAINING PROGRAM

## 2022-12-05 PROCEDURE — 99999 PR PBB SHADOW E&M-EST. PATIENT-LVL IV: ICD-10-PCS | Mod: PBBFAC,TXP,, | Performed by: STUDENT IN AN ORGANIZED HEALTH CARE EDUCATION/TRAINING PROGRAM

## 2022-12-05 PROCEDURE — 99213 PR OFFICE/OUTPT VISIT, EST, LEVL III, 20-29 MIN: ICD-10-PCS | Mod: S$PBB,TXP,, | Performed by: STUDENT IN AN ORGANIZED HEALTH CARE EDUCATION/TRAINING PROGRAM

## 2022-12-05 PROCEDURE — 99214 OFFICE O/P EST MOD 30 MIN: CPT | Mod: PBBFAC,TXP | Performed by: STUDENT IN AN ORGANIZED HEALTH CARE EDUCATION/TRAINING PROGRAM

## 2022-12-05 NOTE — PROGRESS NOTES
Subjective:       Patient ID: Katy Vega is a 62 y.o. female.    Chief Complaint: Follow-up and Weight Check    Patient presents for treatment of obesity.      Co-morbidities:   HTN  ILD  COPD  Lupus  Seizure  Stroke      Physical Activity:   2 hours 2x/week at [employer] (treadmill, exercise bike, weights)  Has not been going regularly due to lymphedema, booster shot pain  Going to lymphedema therapy    Diet Recall:    Hamburgers, steaks, pork chops, chicken  Salads        Medical Weight Loss History  8/7/2020: 253.9 lbs, BMI 36.94, BFP 48%, SMM 73.6 lbs; Ozempic 0.25 mg weekly injections  9/8/2020: 245.6 lbs, BMI 35.8, BFP 47.4, SMM 72.1 lbs; Ozempic 0.5 mg weekly injections  10/8/2020: 240.4 lbs, BMI 35, BFP 45.9%, SMM 72.8 lbs; Ozempic 0.5 mg weekly injections  11/9/2020: 242.7 lbs, BMI 35.3, BFP 44.8%, SMM 74.1 lbs; Ozempic 0.5 mg weekly injections   12/7/2020: 237.7 lbs, BMI 34.6, BFP 41.6 %, SMM 77.6 lbs; Ozempic 0.5 mg weekly injections  1/7/2021: 234.9 lbs, BMI 34.2, BFP 47.6%, SMM 68.1 lbs; Ozempic 0.5 mg weekly injections  3/3/2021: 233.9 lbs, BMI 34.1, BFP 45.3%, SMM 69.7 lbs; Ozempic 1.0 mg weekly injections  4/1/2021: 228.3 lbs, BMI 33.2, BFP 42.1%, SMM 73 lbs; Ozempic 1.0 mg weekly injections  5/3/2021: 225.8 lbs, BMI 32.9, BFP 43.9%, SMM 69.9 lbs; Ozempic 1.0 mg weekly injections  6/7/2021: 218.7 lbs, BMI 31.8, BFP 42.7%, SMM 69.2 lbs; Ozempic 1.0 mg weekly injections  8/16/2021: 220.9 lbs, BMI 32.2, BFP 43.1%, SMM 68.6 lbs; Ozempic 1.0 mg weekly injections  11/18/2021: 213.2 lbs, BMI 31, BFP 42.5%, SMM 67.5 lbs; Ozempic 1.0 mg weekly injections  1/3/2022 ([employer] out for repair): 214 lbs 1.1 oz, BMI 31.61; Ozempic  2/16/2022: 216.2 lbs, BMI 31.5, BFP 42.2%, BFM 91.2, SMM 68.6 lbs, BMR 1595 kcal; Ozempic. Has been out of medicine for 2 weeks  3/23/2022: 214.1 lbs, BMI 31.2, BFP 39.8%, BFM 85.1 lbs, SMM 71.2 lbs, BMR 1634 kcal; Ozempic  10/4/2022: 217.7 lbs, BMI 31.7, BFP 40.7%, BFM 88.7 lbs, SMM  70.8 lbs, BMR 1634 kcal; Ozempic  12/5/2022: 203.1 lbs, BMI 29.6, BFP 42%, BFM 85.2 lbs, SMM 62.3 lbs, BMR 1525 kcal; Ozempic      Follow-up  Pertinent negatives include no abdominal pain, chest pain, chills, fever, nausea, rash, sore throat or vomiting.   Review of Systems   Constitutional:  Negative for chills and fever.   HENT:  Negative for sore throat and trouble swallowing.    Eyes:  Negative for visual disturbance.   Respiratory:  Negative for shortness of breath.    Cardiovascular:  Negative for chest pain and palpitations.   Gastrointestinal:  Negative for abdominal pain, nausea and vomiting.   Integumentary:  Negative for rash.   Neurological:  Negative for dizziness and light-headedness.   Psychiatric/Behavioral:  The patient is not nervous/anxious.        Objective:        Latest Reference Range & Units 06/08/20 09:30   Cholesterol <200 mg/dL 136 (E)   HDL 40 - 59 mg/dL 41 (E)   HDL/Cholesterol Ratio 0.00 - 4.40  3.32 (E)   LDL Calculated <130 mg/dL 83 (E)   Non-HDL Cholesterol <160  95 (E)   Triglycerides <150 mg/dL 58 (E)   Hemoglobin A1C External 4.7 - 5.6 % 5.1 (E)   Estimated Avg Glucose <115 mg/dL 100 (E)   (E): External lab result    Vitals:    12/05/22 1154   BP: 136/65   Pulse: 79           Physical Exam  Vitals reviewed.   Constitutional:       General: She is not in acute distress.     Appearance: Normal appearance. She is obese. She is not ill-appearing, toxic-appearing or diaphoretic.   HENT:      Head: Normocephalic and atraumatic.   Eyes:      Extraocular Movements: Extraocular movements intact.   Cardiovascular:      Rate and Rhythm: Normal rate.   Pulmonary:      Effort: Pulmonary effort is normal. No respiratory distress.   Musculoskeletal:         General: Normal range of motion.      Cervical back: Normal range of motion.      Right lower leg: No edema.      Left lower leg: No edema.   Skin:     General: Skin is warm and dry.   Neurological:      General: No focal deficit present.       Mental Status: She is alert and oriented to person, place, and time.      Gait: Gait normal.   Psychiatric:         Mood and Affect: Mood normal.         Behavior: Behavior normal.         Thought Content: Thought content normal.         Judgment: Judgment normal.       Assessment:       1. Overweight with body mass index (BMI) of 29 to 29.9 in adult    2. Primary hypertension    3. Encounter for weight loss counseling            Plan:   - Ozempic 1.0 mg weekly injections     - Log all food and beverage intake with a daily calorie goal of 1200 calories     - Low intensity aerobic activity for 30 minutes 3-5x/week

## 2023-03-06 ENCOUNTER — TELEPHONE (OUTPATIENT)
Dept: BARIATRICS | Facility: CLINIC | Age: 63
End: 2023-03-06
Payer: MEDICARE

## 2023-03-06 NOTE — TELEPHONE ENCOUNTER
Phoned pt in regard to rescheduling f/u with  . Pt stated her appts were overlapping one another and she has to reschedule. Rescheduled pt for later date . Pt requested rf for Ozempic.

## 2023-05-10 ENCOUNTER — OFFICE VISIT (OUTPATIENT)
Dept: BARIATRICS | Facility: CLINIC | Age: 63
End: 2023-05-10
Payer: MEDICARE

## 2023-05-10 VITALS
SYSTOLIC BLOOD PRESSURE: 150 MMHG | DIASTOLIC BLOOD PRESSURE: 70 MMHG | WEIGHT: 214.81 LBS | HEART RATE: 50 BPM | BODY MASS INDEX: 31.72 KG/M2 | OXYGEN SATURATION: 100 %

## 2023-05-10 DIAGNOSIS — E66.09 CLASS 1 OBESITY DUE TO EXCESS CALORIES WITH SERIOUS COMORBIDITY AND BODY MASS INDEX (BMI) OF 32.0 TO 32.9 IN ADULT: Primary | ICD-10-CM

## 2023-05-10 DIAGNOSIS — I10 PRIMARY HYPERTENSION: ICD-10-CM

## 2023-05-10 DIAGNOSIS — Z71.3 ENCOUNTER FOR WEIGHT LOSS COUNSELING: ICD-10-CM

## 2023-05-10 DIAGNOSIS — Z85.3 HISTORY OF BREAST CANCER: ICD-10-CM

## 2023-05-10 PROCEDURE — 99213 OFFICE O/P EST LOW 20 MIN: CPT | Mod: PBBFAC,TXP | Performed by: STUDENT IN AN ORGANIZED HEALTH CARE EDUCATION/TRAINING PROGRAM

## 2023-05-10 PROCEDURE — 99213 OFFICE O/P EST LOW 20 MIN: CPT | Mod: S$PBB,TXP,, | Performed by: STUDENT IN AN ORGANIZED HEALTH CARE EDUCATION/TRAINING PROGRAM

## 2023-05-10 PROCEDURE — 99213 PR OFFICE/OUTPT VISIT, EST, LEVL III, 20-29 MIN: ICD-10-PCS | Mod: S$PBB,TXP,, | Performed by: STUDENT IN AN ORGANIZED HEALTH CARE EDUCATION/TRAINING PROGRAM

## 2023-05-10 PROCEDURE — 99999 PR PBB SHADOW E&M-EST. PATIENT-LVL III: ICD-10-PCS | Mod: PBBFAC,TXP,, | Performed by: STUDENT IN AN ORGANIZED HEALTH CARE EDUCATION/TRAINING PROGRAM

## 2023-05-10 PROCEDURE — 99999 PR PBB SHADOW E&M-EST. PATIENT-LVL III: CPT | Mod: PBBFAC,TXP,, | Performed by: STUDENT IN AN ORGANIZED HEALTH CARE EDUCATION/TRAINING PROGRAM

## 2023-05-10 NOTE — PROGRESS NOTES
Subjective:       Patient ID: Katy Vega is a 63 y.o. female.    Chief Complaint: Follow-up, Obesity, and Weight Check      Patient presents for treatment of obesity.      Co-morbidities:   HTN  ILD  COPD  Lupus  Seizure  Stroke  H/o breast cacner      Physical Activity:   2 hours 2x/week at [employer] (treadmill, exercise bike, weights)  Has not been going regularly due to lymphedema, booster shot pain  Going to lymphedema therapy    Diet Recall:    Hamburgers, steaks, pork chops, chicken  Cinnamon raisin bread with peanut butter and jelly  Pasta  Fruit  Salads    Has been dealing with bout of depression.  Went on 3 vacations.  Planning to have surgery in June to redo breast reconstruction    Medical Weight Loss History  8/7/2020: 253.9 lbs, BMI 36.94, BFP 48%, SMM 73.6 lbs; Ozempic 0.25 mg weekly injections  9/8/2020: 245.6 lbs, BMI 35.8, BFP 47.4, SMM 72.1 lbs; Ozempic 0.5 mg weekly injections  10/8/2020: 240.4 lbs, BMI 35, BFP 45.9%, SMM 72.8 lbs; Ozempic 0.5 mg weekly injections  11/9/2020: 242.7 lbs, BMI 35.3, BFP 44.8%, SMM 74.1 lbs; Ozempic 0.5 mg weekly injections   12/7/2020: 237.7 lbs, BMI 34.6, BFP 41.6 %, SMM 77.6 lbs; Ozempic 0.5 mg weekly injections  1/7/2021: 234.9 lbs, BMI 34.2, BFP 47.6%, SMM 68.1 lbs; Ozempic 0.5 mg weekly injections  3/3/2021: 233.9 lbs, BMI 34.1, BFP 45.3%, SMM 69.7 lbs; Ozempic 1.0 mg weekly injections  4/1/2021: 228.3 lbs, BMI 33.2, BFP 42.1%, SMM 73 lbs; Ozempic 1.0 mg weekly injections  5/3/2021: 225.8 lbs, BMI 32.9, BFP 43.9%, SMM 69.9 lbs; Ozempic 1.0 mg weekly injections  6/7/2021: 218.7 lbs, BMI 31.8, BFP 42.7%, SMM 69.2 lbs; Ozempic 1.0 mg weekly injections  8/16/2021: 220.9 lbs, BMI 32.2, BFP 43.1%, SMM 68.6 lbs; Ozempic 1.0 mg weekly injections  11/18/2021: 213.2 lbs, BMI 31, BFP 42.5%, SMM 67.5 lbs; Ozempic 1.0 mg weekly injections  1/3/2022 ([facility] out for repair): 214 lbs 1.1 oz, BMI 31.61; Ozempic  2/16/2022: 216.2 lbs, BMI 31.5, BFP 42.2%, BFM 91.2, SMM 68.6  lbs, BMR 1595 kcal; Ozempic. Has been out of medicine for 2 weeks  3/23/2022: 214.1 lbs, BMI 31.2, BFP 39.8%, BFM 85.1 lbs, SMM 71.2 lbs, BMR 1634 kcal; Ozempic  10/4/2022: 217.7 lbs, BMI 31.7, BFP 40.7%, BFM 88.7 lbs, SMM 70.8 lbs, BMR 1634 kcal; Ozempic  12/5/2022: 203.1 lbs, BMI 29.6, BFP 42%, BFM 85.2 lbs, SMM 62.3 lbs, BMR 1525 kcal; Ozempic  5/10/2023: 214.8 lbs, BMI 31.3, BFP 40.6%, BFM 87.4 lbs, SMM 70.5 lbs, BMR 1619 kcal; Ozempic    Follow-up  Pertinent negatives include no abdominal pain, chest pain, chills, fever, nausea, rash, sore throat or vomiting.   Review of Systems   Constitutional:  Negative for chills and fever.   HENT:  Negative for sore throat and trouble swallowing.    Eyes:  Negative for visual disturbance.   Respiratory:  Negative for shortness of breath.    Cardiovascular:  Negative for chest pain and palpitations.   Gastrointestinal:  Negative for abdominal pain, nausea and vomiting.   Integumentary:  Negative for rash.   Neurological:  Negative for dizziness and light-headedness.   Psychiatric/Behavioral:  The patient is not nervous/anxious.        Objective:        Latest Reference Range & Units 06/08/20 09:30   Cholesterol <200 mg/dL 136 (E)   HDL 40 - 59 mg/dL 41 (E)   HDL/Cholesterol Ratio 0.00 - 4.40  3.32 (E)   LDL Calculated <130 mg/dL 83 (E)   Non-HDL Cholesterol <160  95 (E)   Triglycerides <150 mg/dL 58 (E)   Hemoglobin A1C External 4.7 - 5.6 % 5.1 (E)   Estimated Avg Glucose <115 mg/dL 100 (E)   (E): External lab result    Vitals:    05/10/23 1029   BP: (!) 150/70   Pulse: (!) 50           Physical Exam  Vitals reviewed.   Constitutional:       General: She is not in acute distress.     Appearance: Normal appearance. She is obese. She is not ill-appearing, toxic-appearing or diaphoretic.   HENT:      Head: Normocephalic and atraumatic.   Eyes:      Extraocular Movements: Extraocular movements intact.   Cardiovascular:      Rate and Rhythm: Normal rate.   Pulmonary:       Effort: Pulmonary effort is normal. No respiratory distress.   Musculoskeletal:         General: Normal range of motion.      Cervical back: Normal range of motion.      Right lower leg: No edema.      Left lower leg: No edema.   Skin:     General: Skin is warm and dry.   Neurological:      General: No focal deficit present.      Mental Status: She is alert and oriented to person, place, and time.      Gait: Gait normal.   Psychiatric:         Mood and Affect: Mood normal.         Behavior: Behavior normal.         Thought Content: Thought content normal.         Judgment: Judgment normal.       Assessment:       1. Class 1 obesity due to excess calories with serious comorbidity and body mass index (BMI) of 32.0 to 32.9 in adult    2. Primary hypertension    3. Encounter for weight loss counseling    4. History of breast cancer              Plan:   - Ozempic 1.0 mg weekly injections     - Log all food and beverage intake with a daily calorie goal of 1200 calories     - Low intensity aerobic activity for 30 minutes 3-5x/week

## 2023-06-06 ENCOUNTER — OFFICE VISIT (OUTPATIENT)
Dept: OBSTETRICS AND GYNECOLOGY | Facility: CLINIC | Age: 63
End: 2023-06-06
Payer: MEDICARE

## 2023-06-06 VITALS
DIASTOLIC BLOOD PRESSURE: 76 MMHG | SYSTOLIC BLOOD PRESSURE: 124 MMHG | HEIGHT: 69 IN | BODY MASS INDEX: 31.25 KG/M2 | WEIGHT: 211 LBS

## 2023-06-06 DIAGNOSIS — R87.610 ASCUS WITH POSITIVE HIGH RISK HPV CERVICAL: Primary | ICD-10-CM

## 2023-06-06 DIAGNOSIS — R87.810 ASCUS WITH POSITIVE HIGH RISK HPV CERVICAL: Primary | ICD-10-CM

## 2023-06-06 PROCEDURE — 99203 PR OFFICE/OUTPT VISIT, NEW, LEVL III, 30-44 MIN: ICD-10-PCS | Mod: 25,S$GLB,, | Performed by: OBSTETRICS & GYNECOLOGY

## 2023-06-06 PROCEDURE — 88342 CHG IMMUNOCYTOCHEMISTRY: ICD-10-PCS | Mod: 26,,, | Performed by: PATHOLOGY

## 2023-06-06 PROCEDURE — 88342 IMHCHEM/IMCYTCHM 1ST ANTB: CPT | Mod: 26,,, | Performed by: PATHOLOGY

## 2023-06-06 PROCEDURE — 57454 COLPOSCOPY: ICD-10-PCS | Mod: S$GLB,,, | Performed by: OBSTETRICS & GYNECOLOGY

## 2023-06-06 PROCEDURE — 57454 BX/CURETT OF CERVIX W/SCOPE: CPT | Mod: S$GLB,,, | Performed by: OBSTETRICS & GYNECOLOGY

## 2023-06-06 PROCEDURE — 1160F PR REVIEW ALL MEDS BY PRESCRIBER/CLIN PHARMACIST DOCUMENTED: ICD-10-PCS | Mod: CPTII,S$GLB,, | Performed by: OBSTETRICS & GYNECOLOGY

## 2023-06-06 PROCEDURE — 88305 TISSUE EXAM BY PATHOLOGIST: CPT | Mod: 59 | Performed by: PATHOLOGY

## 2023-06-06 PROCEDURE — 3078F PR MOST RECENT DIASTOLIC BLOOD PRESSURE < 80 MM HG: ICD-10-PCS | Mod: CPTII,S$GLB,, | Performed by: OBSTETRICS & GYNECOLOGY

## 2023-06-06 PROCEDURE — 3008F BODY MASS INDEX DOCD: CPT | Mod: CPTII,S$GLB,, | Performed by: OBSTETRICS & GYNECOLOGY

## 2023-06-06 PROCEDURE — 1159F MED LIST DOCD IN RCRD: CPT | Mod: CPTII,S$GLB,, | Performed by: OBSTETRICS & GYNECOLOGY

## 2023-06-06 PROCEDURE — 3074F SYST BP LT 130 MM HG: CPT | Mod: CPTII,S$GLB,, | Performed by: OBSTETRICS & GYNECOLOGY

## 2023-06-06 PROCEDURE — 3078F DIAST BP <80 MM HG: CPT | Mod: CPTII,S$GLB,, | Performed by: OBSTETRICS & GYNECOLOGY

## 2023-06-06 PROCEDURE — 1159F PR MEDICATION LIST DOCUMENTED IN MEDICAL RECORD: ICD-10-PCS | Mod: CPTII,S$GLB,, | Performed by: OBSTETRICS & GYNECOLOGY

## 2023-06-06 PROCEDURE — 88305 TISSUE EXAM BY PATHOLOGIST: CPT | Mod: 26,,, | Performed by: PATHOLOGY

## 2023-06-06 PROCEDURE — 99999 PR PBB SHADOW E&M-EST. PATIENT-LVL III: CPT | Mod: PBBFAC,,, | Performed by: OBSTETRICS & GYNECOLOGY

## 2023-06-06 PROCEDURE — 88305 TISSUE EXAM BY PATHOLOGIST: ICD-10-PCS | Mod: 26,,, | Performed by: PATHOLOGY

## 2023-06-06 PROCEDURE — 3074F PR MOST RECENT SYSTOLIC BLOOD PRESSURE < 130 MM HG: ICD-10-PCS | Mod: CPTII,S$GLB,, | Performed by: OBSTETRICS & GYNECOLOGY

## 2023-06-06 PROCEDURE — 3008F PR BODY MASS INDEX (BMI) DOCUMENTED: ICD-10-PCS | Mod: CPTII,S$GLB,, | Performed by: OBSTETRICS & GYNECOLOGY

## 2023-06-06 PROCEDURE — 88342 IMHCHEM/IMCYTCHM 1ST ANTB: CPT | Performed by: PATHOLOGY

## 2023-06-06 PROCEDURE — 1160F RVW MEDS BY RX/DR IN RCRD: CPT | Mod: CPTII,S$GLB,, | Performed by: OBSTETRICS & GYNECOLOGY

## 2023-06-06 PROCEDURE — 99999 PR PBB SHADOW E&M-EST. PATIENT-LVL III: ICD-10-PCS | Mod: PBBFAC,,, | Performed by: OBSTETRICS & GYNECOLOGY

## 2023-06-06 PROCEDURE — 99203 OFFICE O/P NEW LOW 30 MIN: CPT | Mod: 25,S$GLB,, | Performed by: OBSTETRICS & GYNECOLOGY

## 2023-06-06 RX ORDER — METHYLPREDNISOLONE 4 MG/1
TABLET ORAL
COMMUNITY
Start: 2023-06-01 | End: 2023-07-07

## 2023-06-06 NOTE — PROGRESS NOTES
Chief Complaint: Annual exam/New Patient    Chief Complaint   Patient presents with    Abnormal Pap Smear       HPI:   63 y.o. female here today as a new patient to establish care and for colposcopy. PMH includes breast cancer, COPD, HTN, SLE, Raynaud disease, seizures, stroke/aneurysm. She is s/p supracervical hysterectomy in 2002. Recent pap ASCUS/HPV+. Denies known history of abnormal paps, colposcopy, or excisional procedures. She is not a tobacco user. Patient denies abnormal breast symptoms. Up to date on mammogram. Two sisters with breast cancer, otherwise negative FH of breast, uterine, ovarian, or colon cancer. Patient denies vaginal discharge, itching, irritation, odor, abdominal pain, urinary complaints, or change in bowel habits. She is currently sexually active with one male partner, last time two weeks ago. She has been vaccinated against COVID-19.    Labs / Significant Studies    No visits with results within 6 Month(s) from this visit.   Latest known visit with results is:   Hospital Outpatient Visit on 10/14/2021   Component Date Value Ref Range Status    Physician Comment 10/14/2021    Final                    Value:Spirometry shows moderate restriction based on the reduction in FVC.   Â   Notes:  Â   Consider lung volume determination to confirm the degree of restriction. Also consider DLCO determination if clinically indicated.       Pre FVC 10/14/2021 1.79 (L)  2.36 - 4.08 L Final    PRE FEV5 10/14/2021 1.14 (L)  1.23 - 2.94 L Final    Pre FEV1 10/14/2021 1.41 (L)  1.83 - 3.17 L Final    Pre FEV1 FVC 10/14/2021 78.97  67.43 - 89.05 % Final    Pre FEF 25 75 10/14/2021 1.41  0.95 - 4.01 L/s Final    Pre PEF 10/14/2021 3.43 (L)  4.12 - 8.78 L/s Final    Pre  10/14/2021 6.81  sec Final    FVC Ref 10/14/2021 3.20   Final    FVC LLN 10/14/2021 2.36   Final    FVC Pre Ref 10/14/2021 55.8  % Final    FEV05 REF 10/14/2021 2.08   Final    FEV05 LLN 10/14/2021 1.23   Final    PRE FEV05 REF  10/14/2021 54.7  % Final    FEV1 Ref 10/14/2021 2.51   Final    FEV1 LLN 10/14/2021 1.83   Final    FEV1 Pre Ref 10/14/2021 56.2  % Final    FEV1 FVC Ref 10/14/2021 79   Final    FEV1 FVC LLN 10/14/2021 67   Final    FEV1 FVC Pre Ref 10/14/2021 99.9  % Final    FEF 25 75 Ref 10/14/2021 2.21   Final    FEF 25 75 LLN 10/14/2021 0.95   Final    FEF 25 75 Pre Ref 10/14/2021 63.7  % Final    PEF Ref 10/14/2021 6.45   Final    PEF LLN 10/14/2021 4.12   Final    PEF Pre Ref 10/14/2021 53.2  % Final          Past Medical History:   Diagnosis Date    Aneurysm     Arthritis     Breast cancer     Left breast    Chronic back pain     COPD (chronic obstructive pulmonary disease)     Hypertension     Lupus     Raynaud disease     Seizures     Stroke 12/2012     Past Surgical History:   Procedure Laterality Date    BREAST SURGERY      BREAST SURGERY Left 03/2020    stage 1 breast cancer    NERVE SURGERY      PARTIAL HYSTERECTOMY  2002       Current Outpatient Medications:     ALBUTEROL SULFATE (PROVENTIL HFA INHL), Inhale 1-2 puffs into the lungs as needed. , Disp: , Rfl:     amlodipine (NORVASC) 10 MG tablet, Take 10 mg by mouth once daily., Disp: , Rfl:     budesonide-formoterol 160-4.5 mcg (SYMBICORT) 160-4.5 mcg/actuation HFAA, Inhale 2 puffs into the lungs every 12 (twelve) hours., Disp: , Rfl:     diclofenac sodium (VOLTAREN) 1 % Gel, Apply 2 g topically as needed. , Disp: , Rfl:     duloxetine (CYMBALTA) 60 MG capsule, Take 60 mg by mouth once daily. , Disp: , Rfl:     gabapentin (NEURONTIN) 300 MG capsule, Take 600 mg by mouth 2 (two) times daily., Disp: , Rfl:     hydrocodone-acetaminophen 10-325mg (NORCO)  mg Tab, Take 1 tablet by mouth every 8 (eight) hours as needed for Pain., Disp: , Rfl:     hydroxychloroquine (PLAQUENIL) 200 mg tablet, Take 200 mg by mouth 2 (two) times daily., Disp: , Rfl:     levETIRAcetam (KEPPRA) 250 MG Tab, Take 250 mg by mouth once daily. , Disp: , Rfl:     lisinopril (PRINIVIL,ZESTRIL)  20 MG tablet, Take 20 mg by mouth once daily., Disp: , Rfl:     methylPREDNISolone (MEDROL DOSEPACK) 4 mg tablet, Take by mouth., Disp: , Rfl:     metroNIDAZOLE (FLAGYL) 500 MG tablet, TK 1 T PO BID FOR 7 DAYS, Disp: , Rfl:     mometasone (NASONEX) 50 mcg/actuation nasal spray, 2 sprays by Nasal route daily as needed. , Disp: , Rfl:     mycophenolate (CELLCEPT) 500 mg Tab, Take 1,500 mg by mouth 2 (two) times daily., Disp: , Rfl:     naproxen (NAPROSYN) 500 MG tablet, Take 500 mg by mouth 2 (two) times daily., Disp: , Rfl:     phenytoin (DILANTIN) 100 MG ER capsule, Take 100 mg by mouth once daily. Prescribed tid, takes once daily as needed., Disp: , Rfl:     semaglutide (OZEMPIC) 1 mg/dose (4 mg/3 mL), Inject 1 mg into the skin every 7 days. for 12 doses, Disp: 3 mL, Rfl: 2    silver sulfADIAZINE 1% (SILVADENE) 1 % cream, Apply 1 application topically 3 (three) times a week., Disp: , Rfl:     triamcinolone acetonide 0.1% (KENALOG) 0.1 % cream, Apply layer to site before silvadene twice daily for radiation dermatitis/burn, Disp: , Rfl:   Review of patient's allergies indicates:  No Known Allergies  OB History   No obstetric history on file.     Social History     Tobacco Use    Smoking status: Never    Smokeless tobacco: Never   Substance Use Topics    Alcohol use: Yes     Comment: very occasional - wine    Drug use: Never     Family History   Problem Relation Age of Onset    Lupus Sister     Lung cancer Brother     Breast cancer Sister     Breast cancer Sister     Lung cancer Brother        Review of Systems   Negative except as in HPI     Physical Exam   Vitals:    06/06/23 1059   BP: 124/76     Body mass index is 31.16 kg/m².    Physical Exam  Constitutional:       General: She is not in acute distress.     Appearance: Normal appearance.   Genitourinary:      Vulva normal.      No vaginal discharge, erythema or bleeding.      No vaginal prolapse present.     Mild vaginal atrophy present.     No cervical motion  tenderness or lesion.      Pelvic exam was performed with patient in the lithotomy position.   HENT:      Head: Normocephalic and atraumatic.   Pulmonary:      Effort: Pulmonary effort is normal.   Abdominal:      General: Bowel sounds are normal. There is no distension.      Palpations: Abdomen is soft.      Tenderness: There is no abdominal tenderness. There is no guarding or rebound.   Musculoskeletal:         General: Normal range of motion.      Cervical back: Normal range of motion.   Neurological:      General: No focal deficit present.      Mental Status: She is alert and oriented to person, place, and time.   Skin:     General: Skin is warm and dry.   Psychiatric:         Mood and Affect: Mood normal.         Behavior: Behavior normal.         Thought Content: Thought content normal.   Exam conducted with a chaperone present.        ASSESSMENT:   Annual Well Women Exam  Patient Active Problem List   Diagnosis    ILD (interstitial lung disease)    Lung transplant candidate    Class 1 obesity due to excess calories with serious comorbidity and body mass index (BMI) of 32.0 to 32.9 in adult    Hypertension    Systemic lupus erythematosus with lung involvement    History of breast cancer    ASCUS with positive high risk HPV cervical     Health Maintenance Due   Topic Date Due    Cervical Cancer Screening  Never done    Pneumococcal Vaccines (Age 0-64) (1 - PCV) Never done    HIV Screening  Never done    TETANUS VACCINE  Never done    Shingles Vaccine (1 of 2) Never done    Colorectal Cancer Screening  Never done    COVID-19 Vaccine (3 - Moderna series) 05/26/2021    Hemoglobin A1c (Diabetic Prevention Screening)  06/08/2023     Health Maintenance Topics with due status: Not Due       Topic Last Completion Date    Lipid Panel 06/08/2020    Mammogram 03/17/2023    Influenza Vaccine Not Due         PLAN:  Problem List Items Addressed This Visit          Renal/    ASCUS with positive high risk HPV cervical -  Primary    Relevant Orders    Specimen to Pathology, Ob/Gyn    Colposcopy (Completed)       Follow up for Will contact patient with results.       Lilian Ellington MD  Department of Obstetrics & Gynecology  [facility]

## 2023-06-07 NOTE — PROCEDURES
"Colposcopy    Date/Time: 6/6/2023 10:45 AM  Performed by: Lilian Ellington MD  Authorized by: Lilian Ellington MD     Consent Done?:  Yes (Written)  Timeout:Immediately prior to procedure a time out was called to verify the correct patient, procedure, equipment, support staff and site/side marked as required    Colposcopy Site:  Cervix  Position:  Supine  Acrowhite Lesion? Yes    Atypical Vessels: No    Transformation Zone Adequate?: Yes    Biopsy?: Yes         Location:  Cervix ((12 00))  ECC Performed?: Yes    LEEP Performed?: No    Estimated blood loss (cc):  1   Patient tolerated the procedure well with no immediate complications.   Post-operative instructions were provided for the patient.    Colposcopy:    Katy Vega is a 63 y.o. female, new patient to me, referred to me from an outside provider for ASCUS/HPV+ pap. She has a history of a supracervical hysterectomy and denies history of abnormal pap smears or excisional procedures. She is a nonsmoker, is sexually active, and denies any vaginal complaints or issues today.    The abnormal test results were discussed.  We also discussed HPV infection and its association with abnormal Pap tests and cervical cancer.  The risks and benefits of colposcopy were reviewed.  She agrees to proceed.     UPT N/A    Vitals:  Vitals:    06/06/23 1059   BP: 124/76   Weight: 95.7 kg (211 lb)   Height: 5' 9" (1.753 m)   PainSc: 0-No pain     Body mass index is 31.16 kg/m².    Colposcopy Exam:   TIME OUT PERFORMED.     General Assessment:  Cervix fully visualized: Yes  Squamocolumnar junction fully visualized: No    Abnormal colposcopic findings:   Inadequate colpo due to inability to visualize entire SCJ due to atrophic changes, but small area of AWFL 4-5 mm at 12:00 on ectocervix seen and biopsied. ECC performed and sent as well for endocervical sampling.    Colposcopic Impression:   HPV related changes to mild dysplasia    Biopsy: 12 o'clock.  ECC: was performed    "   Hemostasis was adequate with application of silver nitrate.  The speculum was removed.  The patient tolerated the procedure well.  All collected specimens sent to pathology for histologic analysis.    Assessment and Plan:  ASCUS with positive high risk HPV cervical  -     Specimen to Pathology, Ob/Gyn    Other orders  -     Colposcopy        Post-colposcopy counseling:  For cramping take NSAIDs or Tylenol.    Avoid intercourse, douching, or tampons in the vagina for at least 7 days.   Expect an abnormal discharge due to silver nitrate application for several days.   Call the office for heavy bleeding, significant pain, or a  foul-smelling vaginal discharge.  The importance of keeping follow-up appointments was discussed.  Final plan and follow-up will be based on colposcopy results.  The patient will be notified by phone or via the patient portal with results.      Lilian Ellington MD  Department of Obstetrics & Gynecology  [facility]

## 2023-06-13 ENCOUNTER — TELEPHONE (OUTPATIENT)
Dept: OBSTETRICS AND GYNECOLOGY | Facility: CLINIC | Age: 63
End: 2023-06-13
Payer: MEDICARE

## 2023-06-13 NOTE — TELEPHONE ENCOUNTER
----- Message from Reilly Lovell sent at 6/13/2023 10:33 AM CDT -----  Name of Who is Calling: ADRIAN CONCEPCION [[id number]]            What is the request in detail: Patient is requesting call back to get update on results              Can the clinic reply by [facility]: no              What Number to Call Back if not in [facility]: [phone]

## 2023-06-14 LAB
FINAL PATHOLOGIC DIAGNOSIS: NORMAL
Lab: NORMAL

## 2023-06-30 DIAGNOSIS — F41.9 ANXIETY DUE TO INVASIVE PROCEDURE: Primary | ICD-10-CM

## 2023-06-30 RX ORDER — DIAZEPAM 5 MG/1
5 TABLET ORAL ONCE
Qty: 1 TABLET | Refills: 0 | Status: SHIPPED | OUTPATIENT
Start: 2023-06-30 | End: 2023-07-07

## 2023-06-30 NOTE — PROGRESS NOTES
Confirmed patient's appointment next Friday 7/7 @ 10:45 am for LEEP. Rx for Valium prescribed and patient instructed to have a  to and from her appointment. She voiced understanding.

## 2023-07-07 ENCOUNTER — PROCEDURE VISIT (OUTPATIENT)
Dept: OBSTETRICS AND GYNECOLOGY | Facility: CLINIC | Age: 63
End: 2023-07-07
Payer: MEDICARE

## 2023-07-07 VITALS
SYSTOLIC BLOOD PRESSURE: 140 MMHG | DIASTOLIC BLOOD PRESSURE: 80 MMHG | BODY MASS INDEX: 29.68 KG/M2 | HEIGHT: 71 IN | WEIGHT: 212 LBS

## 2023-07-07 DIAGNOSIS — N87.1 DYSPLASIA OF CERVIX, HIGH GRADE CIN 2: Primary | ICD-10-CM

## 2023-07-07 PROCEDURE — 88305 TISSUE EXAM BY PATHOLOGIST: CPT | Mod: 26,,, | Performed by: PATHOLOGY

## 2023-07-07 PROCEDURE — 88305 TISSUE EXAM BY PATHOLOGIST: CPT | Performed by: PATHOLOGY

## 2023-07-07 PROCEDURE — 99499 UNLISTED E&M SERVICE: CPT | Mod: S$GLB,,, | Performed by: OBSTETRICS & GYNECOLOGY

## 2023-07-07 PROCEDURE — 99499 NO LOS: ICD-10-PCS | Mod: S$GLB,,, | Performed by: OBSTETRICS & GYNECOLOGY

## 2023-07-07 PROCEDURE — 57522 PR CONIZATION CERVIX,LOOP ELECTRD: ICD-10-PCS | Mod: S$GLB,,, | Performed by: OBSTETRICS & GYNECOLOGY

## 2023-07-07 PROCEDURE — 57522 CONIZATION OF CERVIX: CPT | Mod: S$GLB,,, | Performed by: OBSTETRICS & GYNECOLOGY

## 2023-07-07 PROCEDURE — 88307 TISSUE EXAM BY PATHOLOGIST: CPT | Mod: 59 | Performed by: PATHOLOGY

## 2023-07-07 PROCEDURE — 88307 PR  SURG PATH,LEVEL V: ICD-10-PCS | Mod: 26,,, | Performed by: PATHOLOGY

## 2023-07-07 PROCEDURE — 88307 TISSUE EXAM BY PATHOLOGIST: CPT | Mod: 26,,, | Performed by: PATHOLOGY

## 2023-07-07 PROCEDURE — 88305 TISSUE EXAM BY PATHOLOGIST: ICD-10-PCS | Mod: 26,,, | Performed by: PATHOLOGY

## 2023-07-07 RX ORDER — OXYCODONE AND ACETAMINOPHEN 10; 325 MG/1; MG/1
1 TABLET ORAL 4 TIMES DAILY PRN
COMMUNITY
Start: 2023-06-30

## 2023-07-07 NOTE — PROCEDURES
Procedures    Katy Vega 63 y.o.  presents for leep for BIANKA 2 on recent colpo biopsies (12:00) and ECC.    PROCEDURE:     PRE-LEEP PROCEDURE COUNSELING:  Risks of infection, bleeding, pain, injury to adjacent organs reviewed.  That this procedure does not guarantee to completely remove all abnormal cells and that dysplasia may reoccur in the future.  Alternatives to the LEEP procedure were discussed and the patient agrees to proceed. Informed consent was signed and scanned to the chart.     PROCEDURE:  TIME OUT PERFORMED.  The cervix and transformation zone were visualized with a speculum and a local block was obtained with  10 CC OF XYLOCAINE with epinephrine.  The entire transformation zone was excised. ECC was collected.   The base was cauterized with a ball cautery.  Monsels solution was applied to the base to obtain hemostasis and the speculum removed.  The specimen was placed in formalyn and sent to Pathology for a Histopathologic diagnosis.  The patient tolerated the procedure well.    POST LEEP PROCEDURE COUNSELING:  Manage post LEEP cramping with NSAIDs or Tylenol.  Avoid anything in vagina (intercourse, douching, tampons) two weeks after the Procedure.  Expect a watery grey to yellow vaginal discharge for several weeks.  Limit activity for 2 weeks.  Report bleeding heavier than a period, worsening pain, fever > 101.0 F, or foul-smelling vaginal discharge.  Importance of follow-up stressed.    Counseling lasted approximately 15 minutes and all her questions were answered.    FOLLOW-UP based on pathology results. Will call w/ results        Lilian Ellington MD  Department of Obstetrics & Gynecology  [facility]

## 2023-07-12 LAB
FINAL PATHOLOGIC DIAGNOSIS: NORMAL
GROSS: NORMAL
Lab: NORMAL

## 2023-08-07 ENCOUNTER — OFFICE VISIT (OUTPATIENT)
Dept: OBSTETRICS AND GYNECOLOGY | Facility: CLINIC | Age: 63
End: 2023-08-07
Payer: MEDICARE

## 2023-08-07 VITALS
BODY MASS INDEX: 30.27 KG/M2 | HEIGHT: 70 IN | SYSTOLIC BLOOD PRESSURE: 128 MMHG | DIASTOLIC BLOOD PRESSURE: 82 MMHG | WEIGHT: 211.44 LBS

## 2023-08-07 DIAGNOSIS — Z09 POSTOP CHECK: Primary | ICD-10-CM

## 2023-08-07 DIAGNOSIS — N87.1 DYSPLASIA OF CERVIX, HIGH GRADE CIN 2: ICD-10-CM

## 2023-08-07 DIAGNOSIS — Z98.890 STATUS POST LEEP (LOOP ELECTROSURGICAL EXCISION PROCEDURE) OF CERVIX: ICD-10-CM

## 2023-08-07 PROCEDURE — 99999 PR PBB SHADOW E&M-EST. PATIENT-LVL III: ICD-10-PCS | Mod: PBBFAC,,, | Performed by: OBSTETRICS & GYNECOLOGY

## 2023-08-07 PROCEDURE — 3008F PR BODY MASS INDEX (BMI) DOCUMENTED: ICD-10-PCS | Mod: CPTII,S$GLB,, | Performed by: OBSTETRICS & GYNECOLOGY

## 2023-08-07 PROCEDURE — 1159F MED LIST DOCD IN RCRD: CPT | Mod: CPTII,S$GLB,, | Performed by: OBSTETRICS & GYNECOLOGY

## 2023-08-07 PROCEDURE — 3079F PR MOST RECENT DIASTOLIC BLOOD PRESSURE 80-89 MM HG: ICD-10-PCS | Mod: CPTII,S$GLB,, | Performed by: OBSTETRICS & GYNECOLOGY

## 2023-08-07 PROCEDURE — 1160F PR REVIEW ALL MEDS BY PRESCRIBER/CLIN PHARMACIST DOCUMENTED: ICD-10-PCS | Mod: CPTII,S$GLB,, | Performed by: OBSTETRICS & GYNECOLOGY

## 2023-08-07 PROCEDURE — 3074F SYST BP LT 130 MM HG: CPT | Mod: CPTII,S$GLB,, | Performed by: OBSTETRICS & GYNECOLOGY

## 2023-08-07 PROCEDURE — 3079F DIAST BP 80-89 MM HG: CPT | Mod: CPTII,S$GLB,, | Performed by: OBSTETRICS & GYNECOLOGY

## 2023-08-07 PROCEDURE — 1159F PR MEDICATION LIST DOCUMENTED IN MEDICAL RECORD: ICD-10-PCS | Mod: CPTII,S$GLB,, | Performed by: OBSTETRICS & GYNECOLOGY

## 2023-08-07 PROCEDURE — 1160F RVW MEDS BY RX/DR IN RCRD: CPT | Mod: CPTII,S$GLB,, | Performed by: OBSTETRICS & GYNECOLOGY

## 2023-08-07 PROCEDURE — 99024 PR POST-OP FOLLOW-UP VISIT: ICD-10-PCS | Mod: S$GLB,,, | Performed by: OBSTETRICS & GYNECOLOGY

## 2023-08-07 PROCEDURE — 99024 POSTOP FOLLOW-UP VISIT: CPT | Mod: S$GLB,,, | Performed by: OBSTETRICS & GYNECOLOGY

## 2023-08-07 PROCEDURE — 99999 PR PBB SHADOW E&M-EST. PATIENT-LVL III: CPT | Mod: PBBFAC,,, | Performed by: OBSTETRICS & GYNECOLOGY

## 2023-08-07 PROCEDURE — 3008F BODY MASS INDEX DOCD: CPT | Mod: CPTII,S$GLB,, | Performed by: OBSTETRICS & GYNECOLOGY

## 2023-08-07 PROCEDURE — 3074F PR MOST RECENT SYSTOLIC BLOOD PRESSURE < 130 MM HG: ICD-10-PCS | Mod: CPTII,S$GLB,, | Performed by: OBSTETRICS & GYNECOLOGY

## 2023-08-07 NOTE — PROGRESS NOTES
Chief Complaint   Patient presents with    Follow-up       HPI:   63 y.o.  here today for 4 week postop appointment. She is s/p LEEP on 23 for BIANKA 2 on biopsies. Doing well from the procedure, denies pelvic pain, abnormal discharge, or vaginal bleeding. Dealing with back issues, now seeing pain management and having injections.     Pap: ASCUS/HPV+ (s/p supracervical hysterectomy)  Colpo (2023): ECC BIANKA 1-2, 12:00 BIANKA 1-2  LEEP (2023): BIANKA 1 with negative margins, ECC insufficient    Labs / Significant Studies    Procedure visit on 2023   Component Date Value Ref Range Status    Final Pathologic Diagnosis 2023    Final                    Value:1. Cervical LEEP cone biopsy, central shows mild squamous dysplasia (CIN1).  Dysplasia does not involve an inked edge of this cone biopsy.  2. Cervical LEEP cone biopsy, anterior shows areas of acute and chronic inflammation, no dysplasia identified.    3. Endocervical curettage:  Tissue insufficient for diagnosis.      Interp By Jefry Stewart M.D., Signed on 2023 at 13:58    Gross 2023    Final                    Value:Patient ID/MRN:  [id number] Pathology label MRN:  [id number]   Received in 3 parts:  Part 1:Patient ID/MRN:  [id number] Pathology label MRN:  [id number]  Labeled with the patient's name, medical record number, designated &quot;central #1 &quot;, and received in formalin is a 2 fragments of presumed cervix measuring 1.1 x 0.9 x 0.3 cm and 1.0 x 0.5 x 0.3 cm . The external os is not able to be identified.   Endocervical tissue is identified. The endocervical canal is not able to be measured. The transformation zone is not distinct. The ectocervical mucosa is white-tan and smooth. Sectioning reveals a tan-white cut surface.  The specimen is entirely submitted in cassettes [id number] and [id number].   Ink key: black-ectocervical and deep (stromal) margin  Part 2:Patient ID/MRN:  [id number] Pathology label MRN:  [id number]  Labeled  with the patient's name, medical record number, designated &quot;anterior #2&quot;, and received formalin is a 3 fragments of presumed cervix measuring 1.0 x 0.5 x 0.4                           cm, 1.0 x 0.5 x 0.4 cm, and 0.8 x 0.3 x 0.3 cm . The external os is not able   to be identified. Endocervical tissue is identified. The endocervical canal is not able to be measured. The transformation zone is not distinct. The ectocervical mucosa is yellow-tan and smooth on 2 of the fragments.  The 3rd fragment contains a   purple-brown area of discoloration measuring 0.3 x 0.3 cm on the ectocervical mucosa. Sectioning reveals a tan-white cut surface.  The specimen is entirely submitted in cassettes:   [id number]:  Fragment that contains a purple-brown area discoloration  [id number] and [id number]: 2 remaining fragments   Ink key: black-ectocervical and deep (stromal) margin  Part 3:Patient ID/MRN:  [id number] Pathology label MRN:  [id number]  The specimen is received in formalin labeled &quot;ECC #3&quot;.  The specimen consists of white-yellow scant material submitted as a cell block in cassette [id number] Gerard      Disclaimer 07/07/2023 Unless the case is a 'gross only' or additional testing only, the final diagnosis for each specimen is based on a microscopic examination of appropriate tissue sections.   Final   Office Visit on 06/06/2023   Component Date Value Ref Range Status    Final Pathologic Diagnosis 06/06/2023    Final                    Value:RELIAPATH DIAGNOSIS:    A.  ENDOCERVIX, CURETTAGE:  - Fragments of squamous mucosa with mild and moderate dysplasia (BIANKA 1-2), see comment.    B.  CERVIX, 12 O'CLOCK, BIOPSY:  - Fragments of squamous mucosa with mild and moderate dysplasia (BIANKA 1-2), see comment.    COMMENT:  A, B). *Immunohistochemical stains for p16 show focal block positivity, supporting moderate dysplasia.    COLLEEN MENDEZ M.D.       Report attached.    Performing  site:  [address] [address]    &quot;Disclaimer:  This case diagnosis was rendered completely by the outside consultation pathologist and the case is electronically signed by an [facility] pathologist listed below solely to release the report into the medical record.&quot;      Interp By Jennifer Hall M.D., Signed on 06/14/2023 at 13:44    Disclaimer 06/06/2023 Unless the case is a 'gross only' or additional testing only, the final diagnosis for each specimen is based on a microscopic examination of appropriate tissue sections.   Final        Past Medical History:   Diagnosis Date    Aneurysm     Arthritis     Back pain at L4-L5 level     accident    Breast cancer     Left breast    Chronic back pain     COPD (chronic obstructive pulmonary disease)     Hypertension     Lupus     Raynaud disease     Seizures     Stroke 12/2012     Past Surgical History:   Procedure Laterality Date    BREAST SURGERY      BREAST SURGERY Left 03/2020    stage 1 breast cancer    CERVICAL BIOPSY  W/ LOOP ELECTRODE EXCISION  07/2023    LEEP    NERVE SURGERY      PARTIAL HYSTERECTOMY  2002       Current Outpatient Medications:     ALBUTEROL SULFATE (PROVENTIL HFA INHL), Inhale 1-2 puffs into the lungs as needed. , Disp: , Rfl:     amlodipine (NORVASC) 10 MG tablet, Take 10 mg by mouth once daily., Disp: , Rfl:     budesonide-formoterol 160-4.5 mcg (SYMBICORT) 160-4.5 mcg/actuation HFAA, Inhale 2 puffs into the lungs every 12 (twelve) hours., Disp: , Rfl:     diclofenac sodium (VOLTAREN) 1 % Gel, Apply 2 g topically as needed. , Disp: , Rfl:     duloxetine (CYMBALTA) 60 MG capsule, Take 60 mg by mouth once daily. , Disp: , Rfl:     gabapentin (NEURONTIN) 300 MG capsule, Take 600 mg by mouth 2 (two) times daily., Disp: , Rfl:     hydrocodone-acetaminophen 10-325mg (NORCO)  mg Tab, Take 1 tablet by mouth every 8 (eight) hours as needed for Pain., Disp: , Rfl:     hydroxychloroquine (PLAQUENIL) 200 mg tablet,  Take 200 mg by mouth 2 (two) times daily., Disp: , Rfl:     mometasone (NASONEX) 50 mcg/actuation nasal spray, 2 sprays by Nasal route daily as needed. , Disp: , Rfl:     oxyCODONE-acetaminophen (PERCOCET)  mg per tablet, Take 1 tablet by mouth 4 (four) times daily as needed., Disp: , Rfl:     silver sulfADIAZINE 1% (SILVADENE) 1 % cream, Apply 1 application topically 3 (three) times a week., Disp: , Rfl:     triamcinolone acetonide 0.1% (KENALOG) 0.1 % cream, Apply layer to site before silvadene twice daily for radiation dermatitis/burn, Disp: , Rfl:   Review of patient's allergies indicates:  No Known Allergies  OB History    Para Term  AB Living   2 1 1   1 1   SAB IAB Ectopic Multiple Live Births   1       1      # Outcome Date GA Lbr Estiven/2nd Weight Sex Delivery Anes PTL Lv   2 2000           1 Term 83    M Vag-Spont   MELLY     Social History     Tobacco Use    Smoking status: Never    Smokeless tobacco: Never   Substance Use Topics    Alcohol use: Yes     Comment: very occasional - wine    Drug use: Never     Family History   Problem Relation Age of Onset    Lupus Sister     Lung cancer Brother     Breast cancer Sister     Breast cancer Sister     Lung cancer Brother        Review of Systems   Negative except as in HPI      Physical Exam   Vitals:    23 0955   BP: 128/82     Body mass index is 30.34 kg/m².    Physical Exam  Constitutional:       General: She is not in acute distress.     Appearance: Normal appearance.   Genitourinary:      Vulva, bladder and urethral meatus normal.      No vaginal discharge, erythema or bleeding.        Right Adnexa: not palpable.     Left Adnexa: not palpable.     No cervical lesion.            Uterus is absent.      Bladder is not tender.       Pelvic exam was performed with patient in the lithotomy position.   HENT:      Head: Normocephalic and atraumatic.   Pulmonary:      Effort: Pulmonary effort is normal.   Abdominal:      General:  Bowel sounds are normal. There is no distension.      Palpations: Abdomen is soft.      Tenderness: There is no abdominal tenderness. There is no guarding or rebound.   Musculoskeletal:         General: Normal range of motion.      Cervical back: Normal range of motion.   Neurological:      General: No focal deficit present.      Mental Status: She is alert and oriented to person, place, and time.   Skin:     General: Skin is warm and dry.   Psychiatric:         Mood and Affect: Mood normal.         Behavior: Behavior normal.         Thought Content: Thought content normal.        Labs reviewed: Pap/HPV, Colpo, LEEP    ASSESSMENT:   Patient Active Problem List   Diagnosis    ILD (interstitial lung disease)    Lung transplant candidate    Class 1 obesity due to excess calories with serious comorbidity and body mass index (BMI) of 32.0 to 32.9 in adult    Hypertension    Systemic lupus erythematosus with lung involvement    History of breast cancer    ASCUS with positive high risk HPV cervical    Postop check    Status post LEEP (loop electrosurgical excision procedure) of cervix    Dysplasia of cervix, high grade BIANKA 2       PLAN:  Problem List Items Addressed This Visit          Renal/    Status post LEEP (loop electrosurgical excision procedure) of cervix    Current Assessment & Plan     Doing well postoperatively, path no high grade dysplasia, negative margins, LEEP bed healing well. RTC 6 months for repeat pap smear, sooner PRN. Okay to resume normal activities.          Dysplasia of cervix, high grade BIANKA 2       Other    Postop check - Primary        Total time spent on this encounter was 20 minutes.  This includes preparing to see the patient;  obtaining/reviewing separately obtained history;  performing a medical exam and/or evaluation;   counseling/educating the patient;  ordering medications, tests, of procedures;   referring/communicating with other health care professionals;  EMR documentation;   interpreting/communicating results to the patient;  and/or care coordination.    Follow up in about 6 months (around 2/7/2024) for Pap/cotesting.       Lilian Ellington MD  Department of Obstetrics & Gynecology  [facility]

## 2023-08-11 PROBLEM — N87.1 DYSPLASIA OF CERVIX, HIGH GRADE CIN 2: Status: ACTIVE | Noted: 2023-08-11

## 2023-08-11 PROBLEM — Z98.890 STATUS POST LEEP (LOOP ELECTROSURGICAL EXCISION PROCEDURE) OF CERVIX: Status: ACTIVE | Noted: 2023-08-11

## 2023-08-11 PROBLEM — Z09 POSTOP CHECK: Status: ACTIVE | Noted: 2023-08-11

## 2023-08-11 NOTE — ASSESSMENT & PLAN NOTE
Doing well postoperatively, path no high grade dysplasia, negative margins, LEEP bed healing well. RTC 6 months for repeat pap smear, sooner PRN. Okay to resume normal activities.

## 2023-08-18 ENCOUNTER — TELEPHONE (OUTPATIENT)
Dept: TRANSPLANT | Facility: CLINIC | Age: 63
End: 2023-08-18
Payer: MEDICARE

## 2023-08-18 NOTE — TELEPHONE ENCOUNTER
----- Message from Nieves Medel sent at 8/18/2023 10:35 AM CDT -----  Regarding: surgery clearance  Contact: pt [phone]  Pt requesting call back RE: would like to schedule a surgery clearance for emelia breast surgery.         Confirmed contact below:  Contact Name:Katy Vega  Phone Number: [phone]    Attempted to contact patient.  No answer.  Left a message requesting a return call.

## 2023-08-21 ENCOUNTER — TELEPHONE (OUTPATIENT)
Dept: TRANSPLANT | Facility: CLINIC | Age: 63
End: 2023-08-21
Payer: MEDICARE

## 2023-08-21 NOTE — TELEPHONE ENCOUNTER
----- Message from Chelsi Pozo sent at 8/21/2023  2:48 PM CDT -----  Regarding: Consult/Advisory:      Name Of Caller:      Ms. Burns (Patient)      Contact Preference?:    [phone]      What is the nature of the call?:   Returning call to Adilene. Pt requesting clearance for breast sx.    Attempted to contact patient.  No answer.  Left a message requesting a return call.

## 2023-08-21 NOTE — TELEPHONE ENCOUNTER
----- Message from Rosa Maria Darek sent at 8/21/2023  3:48 PM CDT -----  Regarding: Appt  Contact: Pt [phone]    Caller: Katy Vega     Returning call to:  Adilene     Caller can be reached at:  [phone]    Nature of the call: Returning missed call to schedule appt     Attempted to contact patient.  No answer.  Left a message requesting a return call.

## 2023-08-24 ENCOUNTER — TELEPHONE (OUTPATIENT)
Dept: TRANSPLANT | Facility: CLINIC | Age: 63
End: 2023-08-24
Payer: MEDICARE

## 2023-08-24 DIAGNOSIS — J84.9 ILD (INTERSTITIAL LUNG DISEASE): Primary | ICD-10-CM

## 2023-08-24 DIAGNOSIS — Z76.82 LUNG TRANSPLANT CANDIDATE: ICD-10-CM

## 2023-08-24 DIAGNOSIS — M35.1 MCTD (MIXED CONNECTIVE TISSUE DISEASE): ICD-10-CM

## 2023-08-24 NOTE — TELEPHONE ENCOUNTER
----- Message from Barbara Corrales sent at 8/24/2023 11:13 AM CDT -----  Regarding: Consult/Advisory  Contact: Katy Vega  Consult/Advisory    Name Of Caller: Katy Vega      Contact Preference: [phone] (home)      Nature of call: Patient calling to speak to coordinator get a clearance letter for plastic surgery. Patient needing this ASAP. Requesting a call back.     Contacted patient. She stated that she needs clearance for her upcoming breast surgery from pulmonary and cardiology. Inquired if she has seen her lung physician at [facility] recently. She stated that her last appointment with a lung physician was last year when she saw Dr. Aaron and had the PFTs and 6 minute walk test. She stated that she hasn't seen a lung doctor at [facility] in a couple of years. Informed patient that we would schedule a follow-up appointment, but she will need to contact cardiology for an appointment. Patient stated that she would speak with her medicine doctor about the cardiology clearance.     Appointment card given to .

## 2023-08-30 ENCOUNTER — TELEPHONE (OUTPATIENT)
Dept: TRANSPLANT | Facility: CLINIC | Age: 63
End: 2023-08-30
Payer: MEDICARE

## 2023-08-31 ENCOUNTER — HOSPITAL ENCOUNTER (OUTPATIENT)
Dept: PULMONOLOGY | Facility: CLINIC | Age: 63
Discharge: HOME | End: 2023-08-31
Payer: MEDICARE

## 2023-08-31 ENCOUNTER — OFFICE VISIT (OUTPATIENT)
Dept: TRANSPLANT | Facility: CLINIC | Age: 63
End: 2023-08-31
Payer: MEDICARE

## 2023-08-31 VITALS
HEART RATE: 58 BPM | BODY MASS INDEX: 32.16 KG/M2 | OXYGEN SATURATION: 99 % | DIASTOLIC BLOOD PRESSURE: 64 MMHG | SYSTOLIC BLOOD PRESSURE: 126 MMHG | WEIGHT: 217.13 LBS | HEIGHT: 69 IN

## 2023-08-31 VITALS — BODY MASS INDEX: 32.16 KG/M2 | WEIGHT: 217.13 LBS | HEIGHT: 69 IN

## 2023-08-31 DIAGNOSIS — Z76.82 LUNG TRANSPLANT CANDIDATE: ICD-10-CM

## 2023-08-31 DIAGNOSIS — J84.9 ILD (INTERSTITIAL LUNG DISEASE): ICD-10-CM

## 2023-08-31 DIAGNOSIS — E66.9 OBESITY (BMI 30-39.9): ICD-10-CM

## 2023-08-31 DIAGNOSIS — M35.1 MCTD (MIXED CONNECTIVE TISSUE DISEASE): ICD-10-CM

## 2023-08-31 DIAGNOSIS — J84.9 ILD (INTERSTITIAL LUNG DISEASE): Primary | ICD-10-CM

## 2023-08-31 DIAGNOSIS — M32.13 SYSTEMIC LUPUS ERYTHEMATOSUS WITH LUNG INVOLVEMENT, UNSPECIFIED SLE TYPE: ICD-10-CM

## 2023-08-31 DIAGNOSIS — J84.9 INTERSTITIAL PULMONARY DISEASE, UNSPECIFIED: ICD-10-CM

## 2023-08-31 LAB
DLCO SINGLE BREATH LLN: 19.82
DLCO SINGLE BREATH PRE REF: 46.9 %
DLCO SINGLE BREATH REF: 25.55
DLCOC SBVA LLN: 3.17
DLCOC SBVA REF: 4.42
DLCOC SINGLE BREATH LLN: 19.82
DLCOC SINGLE BREATH REF: 25.55
DLCOCSBVAULN: 5.67
DLCOCSINGLEBREATHULN: 31.29
DLCOSINGLEBREATHULN: 31.29
DLCOVA LLN: 3.17
DLCOVA PRE REF: 102.5 %
DLCOVA PRE: 4.53 ML/(MIN*MMHG*L) (ref 3.17–5.67)
DLCOVA REF: 4.42
DLCOVAULN: 5.67
ERV LLN: -16449.18
ERV PRE REF: 35.9 %
ERV REF: 0.82
ERVULN: ABNORMAL
FEF 25 75 LLN: 0.89
FEF 25 75 PRE REF: 60.6 %
FEF 25 75 REF: 2.13
FEV05 LLN: 1.2
FEV05 REF: 2.06
FEV1 FVC LLN: 67
FEV1 FVC PRE REF: 98.7 %
FEV1 FVC REF: 79
FEV1 LLN: 1.77
FEV1 PRE REF: 56.6 %
FEV1 REF: 2.46
FRCPLETH LLN: 2.17
FRCPLETH PREREF: 53.9 %
FRCPLETH REF: 2.99
FRCPLETHULN: 3.81
FVC LLN: 2.29
FVC PRE REF: 56.9 %
FVC REF: 3.14
IVC PRE: 1.57 L (ref 2.29–4.02)
IVC SINGLE BREATH LLN: 2.29
IVC SINGLE BREATH PRE REF: 49.9 %
IVC SINGLE BREATH REF: 3.14
IVCSINGLEBREATHULN: 4.02
LLN IC: -16447.42
PEF LLN: 3.98
PEF PRE REF: 46.3 %
PEF REF: 6.31
PHYSICIAN COMMENT: ABNORMAL
PRE DLCO: 11.98 ML/(MIN*MMHG) (ref 19.82–31.29)
PRE ERV: 0.3 L (ref -16449.18–16450.82)
PRE FEF 25 75: 1.29 L/S (ref 0.89–3.92)
PRE FET 100: 6.31 SEC
PRE FEV05 REF: 55.2 %
PRE FEV1 FVC: 77.85 % (ref 66.99–89.03)
PRE FEV1: 1.39 L (ref 1.77–3.11)
PRE FEV5: 1.14 L (ref 1.2–2.91)
PRE FRC PL: 1.61 L (ref 2.17–3.81)
PRE FVC: 1.78 L (ref 2.29–4.02)
PRE IC: 1.49 L (ref -16447.42–16452.58)
PRE PEF: 2.92 L/S (ref 3.98–8.64)
PRE REF IC: 57.8 %
PRE RV: 1.32 L (ref 1.59–2.74)
PRE TLC: 3.1 L (ref 4.79–6.76)
RAW PRE REF: 215 %
RAW PRE: 6.58 CMH2O*S/L (ref 3.06–3.06)
RAW REF: 3.06
REF IC: 2.58
RV LLN: 1.59
RV PRE REF: 60.8 %
RV REF: 2.16
RVTLC LLN: 30
RVTLC PRE REF: 106 %
RVTLC PRE: 42.44 % (ref 30.45–49.63)
RVTLC REF: 40
RVTLCULN: 50
RVULN: 2.74
SGAW PRE REF: 79.8 %
SGAW PRE: 0.08 1/(CMH2O*S) (ref 0.1–0.1)
SGAW REF: 0.1
TLC LLN: 4.79
TLC PRE REF: 53.7 %
TLC REF: 5.78
TLC ULN: 6.76
ULN IC: ABNORMAL
VA PRE: 2.64 L (ref 5.63–5.63)
VA SINGLE BREATH LLN: 5.63
VA SINGLE BREATH PRE REF: 47 %
VA SINGLE BREATH REF: 5.63
VASINGLEBREATHULN: 5.63
VC LLN: 2.29
VC PRE REF: 56.9 %
VC PRE: 1.78 L (ref 2.29–4.02)
VC REF: 3.14
VC ULN: 4.02

## 2023-08-31 PROCEDURE — 94726 PLETHYSMOGRAPHY LUNG VOLUMES: CPT | Mod: NTX,S$GLB,, | Performed by: INTERNAL MEDICINE

## 2023-08-31 PROCEDURE — 94010 BREATHING CAPACITY TEST: ICD-10-PCS | Mod: NTX,S$GLB,, | Performed by: INTERNAL MEDICINE

## 2023-08-31 PROCEDURE — 3074F SYST BP LT 130 MM HG: CPT | Mod: CPTII,NTX,S$GLB, | Performed by: INTERNAL MEDICINE

## 2023-08-31 PROCEDURE — 1159F MED LIST DOCD IN RCRD: CPT | Mod: CPTII,NTX,S$GLB, | Performed by: INTERNAL MEDICINE

## 2023-08-31 PROCEDURE — 3074F PR MOST RECENT SYSTOLIC BLOOD PRESSURE < 130 MM HG: ICD-10-PCS | Mod: CPTII,NTX,S$GLB, | Performed by: INTERNAL MEDICINE

## 2023-08-31 PROCEDURE — 99214 PR OFFICE/OUTPT VISIT, EST, LEVL IV, 30-39 MIN: ICD-10-PCS | Mod: 25,NTX,S$GLB, | Performed by: INTERNAL MEDICINE

## 2023-08-31 PROCEDURE — 1160F PR REVIEW ALL MEDS BY PRESCRIBER/CLIN PHARMACIST DOCUMENTED: ICD-10-PCS | Mod: CPTII,NTX,S$GLB, | Performed by: INTERNAL MEDICINE

## 2023-08-31 PROCEDURE — 99214 OFFICE O/P EST MOD 30 MIN: CPT | Mod: 25,NTX,S$GLB, | Performed by: INTERNAL MEDICINE

## 2023-08-31 PROCEDURE — 3008F PR BODY MASS INDEX (BMI) DOCUMENTED: ICD-10-PCS | Mod: CPTII,NTX,S$GLB, | Performed by: INTERNAL MEDICINE

## 2023-08-31 PROCEDURE — 94729 DIFFUSING CAPACITY: CPT | Mod: NTX,S$GLB,, | Performed by: INTERNAL MEDICINE

## 2023-08-31 PROCEDURE — 94729 PR C02/MEMBANE DIFFUSE CAPACITY: ICD-10-PCS | Mod: NTX,S$GLB,, | Performed by: INTERNAL MEDICINE

## 2023-08-31 PROCEDURE — 99999 PR PBB SHADOW E&M-EST. PATIENT-LVL III: ICD-10-PCS | Mod: PBBFAC,TXP,, | Performed by: INTERNAL MEDICINE

## 2023-08-31 PROCEDURE — 94618 PULMONARY STRESS TESTING: CPT | Mod: NTX,S$GLB,, | Performed by: INTERNAL MEDICINE

## 2023-08-31 PROCEDURE — 3078F DIAST BP <80 MM HG: CPT | Mod: CPTII,NTX,S$GLB, | Performed by: INTERNAL MEDICINE

## 2023-08-31 PROCEDURE — 94726 PULM FUNCT TST PLETHYSMOGRAP: ICD-10-PCS | Mod: NTX,S$GLB,, | Performed by: INTERNAL MEDICINE

## 2023-08-31 PROCEDURE — 3078F PR MOST RECENT DIASTOLIC BLOOD PRESSURE < 80 MM HG: ICD-10-PCS | Mod: CPTII,NTX,S$GLB, | Performed by: INTERNAL MEDICINE

## 2023-08-31 PROCEDURE — 1160F RVW MEDS BY RX/DR IN RCRD: CPT | Mod: CPTII,NTX,S$GLB, | Performed by: INTERNAL MEDICINE

## 2023-08-31 PROCEDURE — 94010 BREATHING CAPACITY TEST: CPT | Mod: NTX,S$GLB,, | Performed by: INTERNAL MEDICINE

## 2023-08-31 PROCEDURE — 99999 PR PBB SHADOW E&M-EST. PATIENT-LVL III: CPT | Mod: PBBFAC,TXP,, | Performed by: INTERNAL MEDICINE

## 2023-08-31 PROCEDURE — 94618 PULMONARY STRESS TESTING: ICD-10-PCS | Mod: NTX,S$GLB,, | Performed by: INTERNAL MEDICINE

## 2023-08-31 PROCEDURE — 3008F BODY MASS INDEX DOCD: CPT | Mod: CPTII,NTX,S$GLB, | Performed by: INTERNAL MEDICINE

## 2023-08-31 PROCEDURE — 1159F PR MEDICATION LIST DOCUMENTED IN MEDICAL RECORD: ICD-10-PCS | Mod: CPTII,NTX,S$GLB, | Performed by: INTERNAL MEDICINE

## 2023-08-31 NOTE — LETTER
August 31, 2023        Nishith M. Mewada  [address]  [facility] [address]  Phone: [phone]  Fax: [phone]             [facility] - Transplant 1st Fl  [address]  [facility] [address]  Phone: [phone]   Patient: Katy Vega   MR Number: [id number]   YOB: 1960   Date of Visit: 8/31/2023       Dear Dr. Nishith M. Mewada    Thank you for referring Katy Vega to me for evaluation. Attached you will find relevant portions of my assessment and plan of care.    If you have questions, please do not hesitate to call me. I look forward to following Katy Vega along with you.    Sincerely,    Evelin Aaron MD    Enclosure    If you would like to receive this communication electronically, please contact [email or web address] or [phone] to request [employer] access.    [employer] is a tool which provides read-only access to select patient information with whom you have a relationship. Its easy to use and provides real time access to review your patients record including encounter summaries, notes, results, and demographic information.    If you feel you have received this communication in error or would no longer like to receive these types of communications, please e-mail [email or web address]        04-Jul-2017 15:06

## 2023-08-31 NOTE — PROGRESS NOTES
LUNG TRANSPLANT PRE FOLLOW-UP    Referring Physician: Nishith M. 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 She is on no assisted ventilation.  Her New York Heart Association Class is II and a Karnofsky score of 80% - Normal activity with effort: some symptoms of disease. She is not diabetic.     Patient presents today for routine follow up for MCTD-ILD.  Since her last visit, she has been doing well. She is scheduled to undergo breast reconstruction surgery.  She is active as possible and voices no shortness of breath.  Denies any other concerns.  She is tolerating her medications well.      Brief history referring to HPI from July 2019  Patient reports LEPE for years starting in [id number].  At that time, she also noticed increasing fatigue with daily activities.  She was first evaluated at [facility] in 2006 when she evacuated after [place].  She states at that time she was diagnosed with COPD and ILD.  Further work-up showed positive autoimmune serologies and she was diagnosed with Lupus.  She was started on Plaquenil and Prednisone.  She then had intermittent follow-up until she moved back to [place] in 2012.  At that time, she established with Rheumatology at  and was again started on Plaquenil and Prednisone which she remained on until a few months ago.  She then began following with [facility] at [facility].  CT chest showed evidence of GGOs and fibrosis which have been stable on imaging.  They noticed a decrease in her FVC and referred her to [facility]ology who have since stopped the Prednisone,  continued her on Plaquenil, and added MMF.  She has been unable to tolerate more than 1500mg per day due to GI side effects.  Since starting Prednisone years ago, she noticed an increased weight gain of approximately 80-90 lbs.  She has lost 15lbs since stopping.  Remains with LEPE but attempts to go to the gym and rides the stationary bike.  She does not require supplemental oxygen and does not have evidence of PH.  She currently takes Symbicort and prn Albuterol for her COPD.  Does not have exacerbations or require outpatient steroids/abx.     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  She feels she has negligible mild cognitive impairment and gait abnormality.  She has been seizure free for >7 years. She weaned off anti-epileptics on her own.    - Breast cancer about 3 years ago s/p lympectomy (L) with partial masectomy .       She is currently on workers comp following a fall at work which led to a back injury requiring surgery and pain management.  She previously worked as a .  She is  with her  dying from lung cancer in .  Her 2 brothers and father also  of lung cancer and she has had 2 sisters die of breast cancer.  She is UTD on mammograms and had a right axillary LN biopsy last year which was negative.  She is a never smoker but has extensive second hand exposure.  She does not drink or use illicits.     Review of Systems   Constitutional:  Negative for chills, diaphoresis, fever, malaise/fatigue and weight loss.   HENT:  Negative for congestion, ear discharge, ear pain, hearing loss, nosebleeds, sinus pain, sore throat and tinnitus.    Eyes:  Negative for blurred vision, double vision, photophobia, pain, discharge and redness.   Respiratory:  Negative for cough, hemoptysis, sputum production, shortness of breath, wheezing and stridor.    Cardiovascular:  Negative for chest pain,  "palpitations, orthopnea, claudication, leg swelling and PND.   Gastrointestinal:  Negative for abdominal pain, blood in stool, constipation, diarrhea, heartburn, melena, nausea and vomiting.   Genitourinary:  Negative for dysuria, flank pain, frequency, hematuria and urgency.   Musculoskeletal:  Negative for back pain, falls, joint pain, myalgias and neck pain.   Skin:  Negative for itching and rash.   Neurological:  Negative for dizziness, tingling, tremors, sensory change, speech change, focal weakness, seizures, loss of consciousness, weakness and headaches.   Endo/Heme/Allergies:  Negative for environmental allergies and polydipsia. Does not bruise/bleed easily.   Psychiatric/Behavioral:  Negative for depression, hallucinations, memory loss, substance abuse and suicidal ideas. The patient is not nervous/anxious and does not have insomnia.      Objective:   /64 (BP Location: Right arm, Patient Position: Sitting, BP Method: Large (Automatic))   Pulse (!) 58   Ht 5' 9" (1.753 m)   Wt 98.5 kg (217 lb 2.5 oz)   LMP  (LMP Unknown)   SpO2 99% Comment: room air  BMI 32.07 kg/m²   Physical Exam  Constitutional:       Appearance: Normal appearance. She is well-developed.   HENT:      Head: Normocephalic and atraumatic.   Eyes:      Conjunctiva/sclera: Conjunctivae normal.   Cardiovascular:      Rate and Rhythm: Normal rate and regular rhythm.      Heart sounds: Normal heart sounds.   Pulmonary:      Effort: Pulmonary effort is normal.      Breath sounds: Normal breath sounds.   Abdominal:      General: Bowel sounds are normal.      Palpations: Abdomen is soft.   Musculoskeletal:         General: Normal range of motion.      Cervical back: Normal range of motion and neck supple.   Skin:     General: Skin is warm and dry.   Neurological:      Mental Status: She is alert and oriented to person, place, and time.   Psychiatric:         Thought Content: Thought content normal.       Labs:    Lab Visit on 09/14/2020 "   Component Date Value    SARS-CoV2 (COVID-19) [place]* 09/14/2020 Not Detected            8/31/2023     2:14 PM 6/28/2022    10:00 AM 10/14/2021    11:00 AM 9/17/2020    11:00 AM 7/30/2019    11:00 AM   Pulmonary Function Tests   FVC 1.79 liters 1.87 liters 1.79 liters 1.53 liters 1.6 liters   FEV1 1.39 liters 1.46 liters 1.41 liters 1.25 liters 1.24 liters   TLC (liters) 3.1 liters 2.95 liters   2.54 liters   DLCO (ml/mmHg sec) 11.98 ml/mmHg sec 12.68 ml/mmHg sec  18.8 ml/mmHg sec 14.6 ml/mmHg sec   FVC% 56.9 59 55.8 47 45   FEV1% 56.6 59 56.2 49 44   FEF 25-75 1.29 1.31 1.41     FEF 25-75% 60.6 60 63.7     TLC% 53.7 51   43   RV 1.32    1.03   RV% 60.8    46   DLCO% 46.9 49  72 71         8/31/2023     1:52 PM 6/28/2022     9:36 AM 10/14/2021    10:31 AM 9/17/2020    10:20 AM 7/30/2019    10:48 AM   6MW   6MWT Status completed without stopping completed with stops completed without stopping completed without stopping completed without stopping   Patient Reported Dyspnea;Other (Comment) Lightheadedness;Leg pain;Dyspnea Dyspnea;Leg pain;Lightheadedness Dyspnea;Dizziness Lightheadedness;Dyspnea   Was O2 used? No No No No No   6MW Distance walked (feet) 1300 feet 1100 feet 1300 feet 1200 feet 1277 feet   Distance walked (meters) 396.24 meters 335.28 meters 396.24 meters 365.76 meters 389.23 meters   Did patient stop? No Yes No No No   Oxygen Saturation 98 % 99 % 99 % 96 % 99 %   Supplemental Oxygen Room Air Room Air Room Air Room Air Room Air   Heart Rate 64 bpm 61 bpm 62 bpm 74 bpm 81 bpm   Blood Pressure 126/67 131/60 138/66 128/66 144/67   Tee Dyspnea Rating  somewhat heavy somewhat heavy moderate somewhat heavy somewhat heavy   Oxygen Saturation 97 % 100 % 96 % 98 % 90 %   Supplemental Oxygen Room Air Room Air Room Air Room Air Room Air   Heart Rate 74 bpm 66 bpm 85 bpm 94 bpm 79 bpm   Blood Pressure 149/66 144/65 158/72 129/59 161/64   Tee Dyspnea Rating  heavy heavy heavy very heavy very heavy   Recovery Time  (seconds) 166 seconds 120 seconds 125 seconds 74 seconds 137 seconds   Oxygen Saturation 100 % 100 % 99 % 97 % 98 %   Supplemental Oxygen Room Air Room Air Room Air Room Air Room Air   Heart Rate 73 bpm 63 bpm 72 bpm 69 bpm 68 bpm     TTE 04/18/2022        Assessment:-  1. ILD (interstitial lung disease)    2. MCTD (mixed connective tissue disease)    3. Obesity (BMI 30-39.9)    4. Systemic lupus erythematosus with lung involvement, unspecified SLE type    5. Lung transplant candidate      Plan:   - Lung reserve appears to be stable compared to prior studies.  Comparative DLCO dose shows a decline, possibly from effort, but will check chest CT for progression of disease and follow up on echo from cardiology visit.  Good walk distance without desaturation.  Overall clinically doing well and robust respiratory status.  She remains early for consideration for lung transplant workup.  Current barrier for lung transplant is her < 5 year disease free period from breast cancer free interval.        - Doing well on plaquenil and MMF at 1500 mg BID with rituxan every 6 months at [facility].  Follow up with rheumatologist as previously scheduled.     - Breast cancer ( L Breast DCIS grade III, ER -/MA-) s/p L breast segmental mastectomy and lymphectomy.  Patient will need to be 5 years cancer free prior to consideration for lung transplant workup. States that she was diagnosed in 2021.      - Will check chest CT and follow up echo from cardiology visit.    Patient is clinically and physiologically cleared for breast reconstruction surgery from pulmonary standpoint.      RTC in 6 months with spirometry, DLCO, lung volumes and 6MWT.  Will change to ALD clinic.    Neo David NP  Lung Transplant

## 2023-09-02 NOTE — PROCEDURES
Katy Vega is a 63 y.o.  female patient, who presents for a 6 minute walk test ordered by MD Agnieszka.  The diagnosis is Interstitial Lung Disease; Connective Tissue Disease.  The patient's BMI is 32.1 kg/m2.  Predicted distance (lower limit of normal) is 316.24 meters.      Test Results:    The test was completed without stopping. The total time walked was 360 seconds. During walking, the patient reported:  Dyspnea, Other (Comment) (chest tightness). The patient used no assistive devices during testing. Oxygen saturation was measured using forehead pulse oximetry probe.    08/31/2023---------Distance: 396.24 meters (1300 feet)     O2 Sat % Supplemental Oxygen Heart Rate Blood Pressure Tee Scale   Pre-exercise  (Resting) 98 % Room Air 64 bpm 126/67 mmHg 4   During Exercise 97 % Room Air 74 bpm 149/66 mmHg 5-6   Post-exercise  (Recovery) 100 % Room Air  73 bpm 140/61 mmHg      Recovery Time: 166 seconds    Performing nurse/tech: Rex GUEVARA      PREVIOUS STUDY:   06/28/2022---------Distance: 335.28 meters (1100 feet)       O2 Sat % Supplemental Oxygen Heart Rate Blood Pressure Tee Scale   Pre-exercise  (Resting) 99 % Room Air 61 bpm 131/60 mmHg 4   During Exercise 100 % Room Air 66 bpm 144/65 mmHg 5-6   Post-exercise  (Recovery) 100 % Room Air  63 bpm   mmHg        CLINICAL INTERPRETATION:  Six minute walk distance is 396.24 meters (1300 feet) with heavy dyspnea.  During exercise, there was no significant desaturation while breathing room air.  Both blood pressure and heart rate remained stable with walking.  The patient reported non-pulmonary symptoms during exercise.  Since the previous study in June 2022, exercise capacity is significantly improved.  Based upon age and body mass index, exercise capacity is normal.

## 2023-09-05 ENCOUNTER — TELEPHONE (OUTPATIENT)
Dept: TRANSPLANT | Facility: CLINIC | Age: 63
End: 2023-09-05
Payer: MEDICARE

## 2023-09-05 NOTE — TELEPHONE ENCOUNTER
"Clarified with Neo David NP, that the ordered CT chest should be scheduled as soon as possible based on the patient's availability and preference and her status should be changed to "advanced lung disease" since she will not be a lung transplant candidate until she reaches the 5 years cancer-free date in 2026.  Called patient to discuss scheduling an appointment for the CT chest scan. She stated "I'm out of town right now" and requested an appointment during the "first week of next month." Offered an appointment on 10/2, 10/3, or 10/4, and she accepted the 0815 slot on Monday, 10/2/23. Informed patient I will mail to her a printed information slip with the details. The patient verbalized her understanding and all questions at this time were answered to her satisfaction.   "

## 2023-10-02 ENCOUNTER — HOSPITAL ENCOUNTER (OUTPATIENT)
Dept: RADIOLOGY | Facility: HOSPITAL | Age: 63
Discharge: HOME | End: 2023-10-02
Attending: NURSE PRACTITIONER
Payer: MEDICARE

## 2023-10-02 DIAGNOSIS — J84.9 ILD (INTERSTITIAL LUNG DISEASE): ICD-10-CM

## 2023-10-02 DIAGNOSIS — J84.9 INTERSTITIAL PULMONARY DISEASE, UNSPECIFIED: ICD-10-CM

## 2023-10-02 PROCEDURE — 71250 CT THORAX DX C-: CPT | Mod: TC,TXP

## 2023-10-02 PROCEDURE — 71250 CT THORAX DX C-: CPT | Mod: 26,NTX,, | Performed by: STUDENT IN AN ORGANIZED HEALTH CARE EDUCATION/TRAINING PROGRAM

## 2023-10-02 PROCEDURE — 71250 CT CHEST WITHOUT CONTRAST: ICD-10-PCS | Mod: 26,NTX,, | Performed by: STUDENT IN AN ORGANIZED HEALTH CARE EDUCATION/TRAINING PROGRAM

## 2023-10-26 ENCOUNTER — OFFICE VISIT (OUTPATIENT)
Dept: URGENT CARE | Facility: CLINIC | Age: 63
End: 2023-10-26
Payer: MEDICARE

## 2023-10-26 ENCOUNTER — TELEPHONE (OUTPATIENT)
Dept: ORTHOPEDICS | Facility: CLINIC | Age: 63
End: 2023-10-26
Payer: MEDICARE

## 2023-10-26 VITALS
DIASTOLIC BLOOD PRESSURE: 52 MMHG | OXYGEN SATURATION: 95 % | HEIGHT: 69 IN | RESPIRATION RATE: 16 BRPM | SYSTOLIC BLOOD PRESSURE: 133 MMHG | TEMPERATURE: 99 F | HEART RATE: 68 BPM | BODY MASS INDEX: 32.16 KG/M2 | WEIGHT: 217.13 LBS

## 2023-10-26 DIAGNOSIS — T14.90XA INJURY: ICD-10-CM

## 2023-10-26 DIAGNOSIS — S52.552A OTHER CLOSED EXTRA-ARTICULAR FRACTURE OF DISTAL END OF LEFT RADIUS, INITIAL ENCOUNTER: Primary | ICD-10-CM

## 2023-10-26 PROCEDURE — 29125 PR APPLY FOREARM SPLINT,STATIC: ICD-10-PCS | Mod: S$GLB,TXP,, | Performed by: PHYSICIAN ASSISTANT

## 2023-10-26 PROCEDURE — 73110 XR WRIST COMPLETE 3 VIEWS LEFT: ICD-10-PCS | Mod: LT,S$GLB,TXP, | Performed by: RADIOLOGY

## 2023-10-26 PROCEDURE — 29125 APPL SHORT ARM SPLINT STATIC: CPT | Mod: S$GLB,TXP,, | Performed by: PHYSICIAN ASSISTANT

## 2023-10-26 PROCEDURE — 99203 PR OFFICE/OUTPT VISIT, NEW, LEVL III, 30-44 MIN: ICD-10-PCS | Mod: S$GLB,TXP,, | Performed by: PHYSICIAN ASSISTANT

## 2023-10-26 PROCEDURE — 73110 X-RAY EXAM OF WRIST: CPT | Mod: LT,S$GLB,TXP, | Performed by: RADIOLOGY

## 2023-10-26 PROCEDURE — 99203 OFFICE O/P NEW LOW 30 MIN: CPT | Mod: S$GLB,TXP,, | Performed by: PHYSICIAN ASSISTANT

## 2023-10-26 RX ORDER — SEMAGLUTIDE 1.34 MG/ML
INJECTION, SOLUTION SUBCUTANEOUS
COMMUNITY

## 2023-10-26 RX ORDER — AMLODIPINE BESYLATE 5 MG/1
1 TABLET ORAL DAILY
COMMUNITY

## 2023-10-26 RX ORDER — LISINOPRIL 20 MG/1
1 TABLET ORAL DAILY
COMMUNITY

## 2023-10-26 RX ORDER — DIAZEPAM 5 MG/1
TABLET ORAL
COMMUNITY

## 2023-10-26 RX ORDER — METRONIDAZOLE 500 MG/1
TABLET ORAL
COMMUNITY

## 2023-10-26 RX ORDER — LIDOCAINE 50 MG/G
PATCH TOPICAL
COMMUNITY

## 2023-10-26 RX ORDER — MELOXICAM 7.5 MG/1
1 TABLET ORAL DAILY
COMMUNITY

## 2023-10-26 RX ORDER — ACYCLOVIR 400 MG/1
TABLET ORAL
COMMUNITY

## 2023-10-26 RX ORDER — ONDANSETRON 8 MG/1
TABLET, ORALLY DISINTEGRATING ORAL
COMMUNITY

## 2023-10-26 RX ORDER — DEXBROMPHENIRAMINE MALEATE, DEXTROMETHORPHAN HYDROBROMIDE AND PHENYLEPHRINE HYDROCHLORIDE 2; 15; 7.5 MG/5ML; MG/5ML; MG/5ML
LIQUID ORAL
COMMUNITY

## 2023-10-26 RX ORDER — NAPROXEN 375 MG/1
TABLET ORAL
COMMUNITY

## 2023-10-26 RX ORDER — LEVETIRACETAM 250 MG/1
TABLET ORAL
COMMUNITY

## 2023-10-26 RX ORDER — LEVETIRACETAM 500 MG/1
TABLET ORAL
COMMUNITY

## 2023-10-26 RX ORDER — CEPHALEXIN 500 MG/1
1 CAPSULE ORAL 2 TIMES DAILY
COMMUNITY

## 2023-10-26 RX ORDER — POLYETHYLENE GLYCOL 3350, SODIUM SULFATE ANHYDROUS, SODIUM BICARBONATE, SODIUM CHLORIDE, POTASSIUM CHLORIDE 236; 22.74; 6.74; 5.86; 2.97 G/4L; G/4L; G/4L; G/4L; G/4L
POWDER, FOR SOLUTION ORAL
COMMUNITY

## 2023-10-26 NOTE — TELEPHONE ENCOUNTER
Spoke with pt.   Scheduled her with Mily Masterson PA-C tomorrow at 1230 pm.  Pt will arrive at 11:30 am

## 2023-10-26 NOTE — TELEPHONE ENCOUNTER
----- Message from Analia Mercado MA sent at 10/26/2023  2:29 PM CDT -----  Regarding: FW: appt req  Contact: pt  Please advise.  ----- Message -----  From: Tasha Goetz  Sent: 10/26/2023  12:58 PM CDT  To: [facility] Triage  Subject: appt req                                         Type: Appointment Request    Caller is requesting an appointment for a new patient.    Name of Caller: pt  Reason for appointment: S52.552A (ICD-10-CM) - Other closed extra-articular fracture of distal end of left radius, initial encounter  Would the patient rather a call back or a response via [facility]? Call back  Best Call Back Number: [phone]  Additional Information: A significant Fall, Please call to advise, Thank You

## 2023-10-26 NOTE — PATIENT INSTRUCTIONS
Please review attached instructions.    Keep left arm splinted at all times.    You must understand that you've received an Urgent Care treatment only and that you may be released before all your medical problems are known or treated. You, the patient, will arrange for follow up care as instructed.  Follow up with your PCP or specialty clinic as directed in the next 1-2 weeks if not improved or as needed.  You may call [phone] to schedule an appointment with the appropriate provider.  If your condition worsens we recommend that you receive another evaluation at the emergency room immediately or contact your primary medical clinics after hours call service to discuss your concerns.    If you were prescribed a narcotic or controlled medication, do not drive or operate heavy equipment or machinery while taking these medications.    If you smoke, please stop smoking.

## 2023-10-26 NOTE — PROGRESS NOTES
"Subjective:      Patient ID: Katy Vega is a 63 y.o. female.    Vitals:  height is 5' 9" (1.753 m) and weight is 98.5 kg (217 lb 2.5 oz). Her oral temperature is 98.6 °F (37 °C). Her blood pressure is 133/52 (abnormal) and her pulse is 68. Her respiration is 16 and oxygen saturation is 95%.     Chief Complaint: Wrist Pain    62 y/o RHD F presents left wrist pain from falling out of her chair this morning. Pt was on her porch when 2 large dogs came on her porch. Pt fell out her chair and landed on left wrist. Pt describes pain as burning and sore. Pain radiates proximally to forearm.     Wrist Pain   The pain is present in the left wrist. This is a new problem. The current episode started yesterday. The quality of the pain is described as burning and aching. Associated symptoms include an inability to bear weight, a limited range of motion and stiffness. Pertinent negatives include no numbness or tingling. Treatments tried: ice. The treatment provided no relief.       Constitution: Positive for activity change.   HENT:  Negative for facial trauma.    Neck: Negative for neck pain.   Cardiovascular:  Negative for chest trauma.   Eyes:  Negative for eye trauma.   Gastrointestinal:  Negative for abdominal trauma.   Musculoskeletal:  Positive for trauma, joint pain and abnormal ROM of joint.   Skin:  Negative for wound and bruising.   Neurological:  Negative for numbness and tingling.      Objective:     Physical Exam   Constitutional: She is oriented to person, place, and time. She appears well-developed. No distress.   HENT:   Head: Normocephalic and atraumatic.   Eyes: Conjunctivae are normal. No scleral icterus.   Pulmonary/Chest: Effort normal. No respiratory distress.   Musculoskeletal:      Right wrist: She exhibits decreased range of motion, tenderness and swelling. She exhibits no deformity.        Hands:       Comments: Focal tenderness and swelling about the left distal radius.  Full active range of " motion of all fingers.  Neurovascularly intact.     Neurological: She is alert and oriented to person, place, and time.   Skin: Skin is warm, dry and not diaphoretic.   Psychiatric: Her behavior is normal. Judgment and thought content normal.   Vitals reviewed.        XR WRIST COMPLETE 3 VIEWS LEFT    Result Date: 10/26/2023  EXAMINATION: XR WRIST COMPLETE 3 VIEWS LEFT CLINICAL HISTORY: Injury, unspecified, initial encounter FINDINGS: Wrist complete three views left. There is a distal radial fracture.  There is good alignment and no complication. Electronically signed by: Demetrio Shields MD Date:    10/26/2023 Time:    11:18         Assessment:     1. Other closed extra-articular fracture of distal end of left radius, initial encounter    2. Injury        Plan:       Other closed extra-articular fracture of distal end of left radius, initial encounter  -     Ambulatory referral/consult to Orthopedics  -     SLING FOR HOME USE  -     SPLINT APPLICATION; Future; Expected date: 10/26/2023    Injury  -     XR WRIST COMPLETE 3 VIEWS LEFT; Future; Expected date: 10/26/2023    Patient was placed in sugar-tong splint with sling for support.  She will be referred to Orthopedics for further management.  She may take Percocet as needed for pain.      Patient Instructions   Please review attached instructions.    Keep left arm splinted at all times.    You must understand that you've received an Urgent Care treatment only and that you may be released before all your medical problems are known or treated. You, the patient, will arrange for follow up care as instructed.  Follow up with your PCP or specialty clinic as directed in the next 1-2 weeks if not improved or as needed.  You may call [phone] to schedule an appointment with the appropriate provider.  If your condition worsens we recommend that you receive another evaluation at the emergency room immediately or contact your primary medical clinics after hours call  service to discuss your concerns.    If you were prescribed a narcotic or controlled medication, do not drive or operate heavy equipment or machinery while taking these medications.    If you smoke, please stop smoking.

## 2023-10-27 ENCOUNTER — OFFICE VISIT (OUTPATIENT)
Dept: ORTHOPEDICS | Facility: CLINIC | Age: 63
End: 2023-10-27
Payer: MEDICARE

## 2023-10-27 VITALS — WEIGHT: 217.13 LBS | BODY MASS INDEX: 32.16 KG/M2 | HEIGHT: 69 IN

## 2023-10-27 DIAGNOSIS — S52.514A NONDISPLACED FRACTURE OF RIGHT RADIAL STYLOID PROCESS, INITIAL ENCOUNTER FOR CLOSED FRACTURE: Primary | ICD-10-CM

## 2023-10-27 PROCEDURE — 4010F ACE/ARB THERAPY RXD/TAKEN: CPT | Mod: CPTII,S$GLB,, | Performed by: PHYSICIAN ASSISTANT

## 2023-10-27 PROCEDURE — 29130 APPL FINGER SPLINT STATIC: CPT | Mod: LT,S$GLB,, | Performed by: ORTHOPAEDIC SURGERY

## 2023-10-27 PROCEDURE — 99999 PR PBB SHADOW E&M-EST. PATIENT-LVL II: CPT | Mod: PBBFAC,,, | Performed by: PHYSICIAN ASSISTANT

## 2023-10-27 PROCEDURE — 4010F PR ACE/ARB THEARPY RXD/TAKEN: ICD-10-PCS | Mod: CPTII,S$GLB,, | Performed by: PHYSICIAN ASSISTANT

## 2023-10-27 PROCEDURE — 29130 PR APPLY FINGER SPLINT,STATIC: ICD-10-PCS | Mod: LT,S$GLB,, | Performed by: ORTHOPAEDIC SURGERY

## 2023-10-27 PROCEDURE — 3008F PR BODY MASS INDEX (BMI) DOCUMENTED: ICD-10-PCS | Mod: CPTII,S$GLB,, | Performed by: PHYSICIAN ASSISTANT

## 2023-10-27 PROCEDURE — 99999 PR PBB SHADOW E&M-EST. PATIENT-LVL II: ICD-10-PCS | Mod: PBBFAC,,, | Performed by: PHYSICIAN ASSISTANT

## 2023-10-27 PROCEDURE — 99213 OFFICE O/P EST LOW 20 MIN: CPT | Mod: 25,S$GLB,, | Performed by: PHYSICIAN ASSISTANT

## 2023-10-27 PROCEDURE — 99213 PR OFFICE/OUTPT VISIT, EST, LEVL III, 20-29 MIN: ICD-10-PCS | Mod: 25,S$GLB,, | Performed by: PHYSICIAN ASSISTANT

## 2023-10-27 PROCEDURE — 3008F BODY MASS INDEX DOCD: CPT | Mod: CPTII,S$GLB,, | Performed by: PHYSICIAN ASSISTANT

## 2023-10-27 NOTE — PROGRESS NOTES
Patient presented to cast room for modification to their left sugar tong splint. A well padded and well molded plaster thumb spica splint was applied in addition to the sugar tong splint already being worn by the patient. Splint care instructions were discussed, all questions were answered, patient verbalized understanding.

## 2023-11-03 ENCOUNTER — HOSPITAL ENCOUNTER (OUTPATIENT)
Dept: RADIOLOGY | Facility: HOSPITAL | Age: 63
Discharge: HOME | End: 2023-11-03
Attending: PHYSICIAN ASSISTANT
Payer: MEDICARE

## 2023-11-03 ENCOUNTER — OFFICE VISIT (OUTPATIENT)
Dept: ORTHOPEDICS | Facility: CLINIC | Age: 63
End: 2023-11-03
Payer: MEDICARE

## 2023-11-03 ENCOUNTER — TELEPHONE (OUTPATIENT)
Dept: ORTHOPEDICS | Facility: CLINIC | Age: 63
End: 2023-11-03
Payer: MEDICARE

## 2023-11-03 DIAGNOSIS — S52.515A NONDISP FX OF LEFT RADIAL STYLOID PROCESS, INIT FOR CLOS FX: ICD-10-CM

## 2023-11-03 DIAGNOSIS — S52.515A NONDISP FX OF LEFT RADIAL STYLOID PROCESS, INIT FOR CLOS FX: Primary | ICD-10-CM

## 2023-11-03 PROCEDURE — 73110 X-RAY EXAM OF WRIST: CPT | Mod: 26,LT,, | Performed by: RADIOLOGY

## 2023-11-03 PROCEDURE — 73110 X-RAY EXAM OF WRIST: CPT | Mod: TC,LT,NTX

## 2023-11-03 PROCEDURE — 99213 PR OFFICE/OUTPT VISIT, EST, LEVL III, 20-29 MIN: ICD-10-PCS | Mod: 25,S$GLB,, | Performed by: PHYSICIAN ASSISTANT

## 2023-11-03 PROCEDURE — 29075 PR APPLY FOREARM CAST: ICD-10-PCS | Mod: LT,S$GLB,, | Performed by: ORTHOPAEDIC SURGERY

## 2023-11-03 PROCEDURE — 73110 XR WRIST COMPLETE 3 VIEWS LEFT: ICD-10-PCS | Mod: 26,LT,, | Performed by: RADIOLOGY

## 2023-11-03 PROCEDURE — 99999 PR PBB SHADOW E&M-EST. PATIENT-LVL I: ICD-10-PCS | Mod: PBBFAC,,, | Performed by: PHYSICIAN ASSISTANT

## 2023-11-03 PROCEDURE — 99213 OFFICE O/P EST LOW 20 MIN: CPT | Mod: 25,S$GLB,, | Performed by: PHYSICIAN ASSISTANT

## 2023-11-03 PROCEDURE — 4010F PR ACE/ARB THEARPY RXD/TAKEN: ICD-10-PCS | Mod: CPTII,S$GLB,, | Performed by: PHYSICIAN ASSISTANT

## 2023-11-03 PROCEDURE — 29075 APPL CST ELBW FNGR SHORT ARM: CPT | Mod: LT,S$GLB,, | Performed by: ORTHOPAEDIC SURGERY

## 2023-11-03 PROCEDURE — 4010F ACE/ARB THERAPY RXD/TAKEN: CPT | Mod: CPTII,S$GLB,, | Performed by: PHYSICIAN ASSISTANT

## 2023-11-03 PROCEDURE — 99999 PR PBB SHADOW E&M-EST. PATIENT-LVL I: CPT | Mod: PBBFAC,,, | Performed by: PHYSICIAN ASSISTANT

## 2023-11-03 NOTE — PROGRESS NOTES
Patient presented to cast room for removal of an ortho glass sugar tong splint and a plaster thumb spica splint of the left upper extremity. Splint was removed, skin intact, no abnormalities noted. After exam, patient was placed into a  well padded and well molded left fiberglass thumb spica [place] cast. Cast care instructions were discussed, all questions were answered, patient verbalized understanding.

## 2023-11-03 NOTE — TELEPHONE ENCOUNTER
Spoke with pt. Pt will be seen 11/3/23 @ 12pm. Patient states verbal understanding and has no further questions.

## 2023-11-03 NOTE — TELEPHONE ENCOUNTER
----- Message from Barrington Hernandes sent at 11/3/2023 10:41 AM CDT -----  Regarding: PT IS RUNNING LATE FOR APPT DUE TO RIDE SHARE NOT COMING ON TIME  Contact: pt  Pt is wanting a call back and wants staff to know she is on her way.. Pt is having a really hard time with arm hurting.     Confirmed contact info below:  Contact Name: Katy Vega  Phone Number: [phone]

## 2023-11-13 PROBLEM — Z09 POSTOP CHECK: Status: RESOLVED | Noted: 2023-08-11 | Resolved: 2023-11-13

## 2023-11-20 ENCOUNTER — OFFICE VISIT (OUTPATIENT)
Dept: ORTHOPEDICS | Facility: CLINIC | Age: 63
End: 2023-11-20
Payer: MEDICARE

## 2023-11-20 ENCOUNTER — HOSPITAL ENCOUNTER (OUTPATIENT)
Dept: RADIOLOGY | Facility: HOSPITAL | Age: 63
Discharge: HOME | End: 2023-11-20
Attending: PHYSICIAN ASSISTANT
Payer: MEDICARE

## 2023-11-20 VITALS — HEIGHT: 69 IN | WEIGHT: 217.13 LBS | BODY MASS INDEX: 32.16 KG/M2

## 2023-11-20 DIAGNOSIS — S52.515A NONDISP FX OF LEFT RADIAL STYLOID PROCESS, INIT FOR CLOS FX: Primary | ICD-10-CM

## 2023-11-20 DIAGNOSIS — S52.515A NONDISP FX OF LEFT RADIAL STYLOID PROCESS, INIT FOR CLOS FX: ICD-10-CM

## 2023-11-20 DIAGNOSIS — Z47.89 ENCOUNTER FOR CAST CHANGE: ICD-10-CM

## 2023-11-20 PROCEDURE — 99999 PR PBB SHADOW E&M-EST. PATIENT-LVL II: ICD-10-PCS | Mod: PBBFAC,,, | Performed by: PHYSICIAN ASSISTANT

## 2023-11-20 PROCEDURE — 73110 X-RAY EXAM OF WRIST: CPT | Mod: TC,LT,NTX

## 2023-11-20 PROCEDURE — 73110 XR WRIST COMPLETE 3 VIEWS LEFT: ICD-10-PCS | Mod: 26,LT,, | Performed by: RADIOLOGY

## 2023-11-20 PROCEDURE — 99499 UNLISTED E&M SERVICE: CPT | Mod: S$GLB,,, | Performed by: PHYSICIAN ASSISTANT

## 2023-11-20 PROCEDURE — 29065 APPL CST SHO TO HAND LNG ARM: CPT | Mod: LT,S$GLB,, | Performed by: ORTHOPAEDIC SURGERY

## 2023-11-20 PROCEDURE — 99499 NO LOS: ICD-10-PCS | Mod: S$GLB,,, | Performed by: PHYSICIAN ASSISTANT

## 2023-11-20 PROCEDURE — 29065 PR APPLY LONG ARM CAST: ICD-10-PCS | Mod: LT,S$GLB,, | Performed by: ORTHOPAEDIC SURGERY

## 2023-11-20 PROCEDURE — 73110 X-RAY EXAM OF WRIST: CPT | Mod: 26,LT,, | Performed by: RADIOLOGY

## 2023-11-20 PROCEDURE — 29730 WINDOWING OF CAST: CPT | Mod: 51,LT,S$GLB, | Performed by: ORTHOPAEDIC SURGERY

## 2023-11-20 PROCEDURE — 29730 PR WINDOWING OF CAST: ICD-10-PCS | Mod: 51,LT,S$GLB, | Performed by: ORTHOPAEDIC SURGERY

## 2023-11-20 PROCEDURE — 99999 PR PBB SHADOW E&M-EST. PATIENT-LVL II: CPT | Mod: PBBFAC,,, | Performed by: PHYSICIAN ASSISTANT

## 2023-11-20 NOTE — PROGRESS NOTES
LT fiberglass thumb spica munster cast removal and munster application ordered by SELENE Masterson. Skin intact with no redness or bruising.

## 2023-11-24 NOTE — PROGRESS NOTES
Subjective:     Patient ID: Katy Vega is a 63 y.o. female.    Chief Complaint: Pain and Injury of the Left Wrist    HPI    Patient is a 63 year old female who presented to urgent care on 10/26/23 with left wrist pain after falling off her porch.   RADS: There is a distal radial fracture.   Patient treated in a splint. Reports pain is not controlled.     Review of Systems   Constitutional: Negative for chills and fever.   Cardiovascular:  Negative for chest pain.   Respiratory:  Negative for cough and shortness of breath.    Skin:  Negative for color change, dry skin, itching, nail changes, poor wound healing and rash.   Musculoskeletal:  Positive for falls.        Left distal radius fracture    Neurological:  Negative for dizziness.   Psychiatric/Behavioral:  Negative for altered mental status. The patient is not nervous/anxious.    All other systems reviewed and are negative.      Objective:       General    Constitutional: She is oriented to person, place, and time. She appears well-developed and well-nourished. No distress.   HENT:   Head: Atraumatic.   Eyes: Conjunctivae are normal.   Cardiovascular:  Normal rate.            Pulmonary/Chest: Effort normal.   Neurological: She is alert and oriented to person, place, and time.   Psychiatric: She has a normal mood and affect. Her behavior is normal.         Left Hand/Wrist Exam     Comments:  Splint removed  TTP to distal radius  Decreased range of motion in all planes.   NVI             Physical Exam  Constitutional:       General: She is not in acute distress.     Appearance: She is well-developed and well-nourished. She is not diaphoretic.   HENT:      Head: Atraumatic.   Eyes:      Conjunctiva/sclera: Conjunctivae normal.   Cardiovascular:      Rate and Rhythm: Normal rate.   Pulmonary:      Effort: Pulmonary effort is normal.   Neurological:      Mental Status: She is alert and oriented to person, place, and time.   Psychiatric:         Mood and  Affect: Mood and affect normal.         Behavior: Behavior normal.     RADS: reviewed by myself:     There is a distal radial fracture.   Assessment:     Encounter Diagnosis   Name Primary?    Nondisplaced fracture of right radial styloid process, initial encounter for closed fracture Yes       Plan:      Dicussed plan with the patient. At this time plan is for non-operative treatment .  Placed into a sugatong thumbspika. Is to be NWB. Pain medication, multimodal, ice and elevation. Return to clinic in 1 week at that time x-ray of her wrist is needed OOS.

## 2023-11-24 NOTE — PROGRESS NOTES
Subjective:     Patient ID: Katy Vega is a 63 y.o. female.    Chief Complaint: No chief complaint on file.    HPI    Patient is a 63 year old female who presented to urgent care on 10/26/23 with left wrist pain after falling off her porch.   RADS: There is a distal radial fracture.   Patient treated in a splint. Reports pain is not controlled.     11/03/23: Over all doing better but still painful. No numbness or tignling.     Review of Systems   Constitutional: Negative for chills and fever.   Cardiovascular:  Negative for chest pain.   Respiratory:  Negative for cough and shortness of breath.    Skin:  Negative for color change, dry skin, itching, nail changes, poor wound healing and rash.   Musculoskeletal:  Positive for falls.        Left distal radius fracture    Neurological:  Negative for dizziness.   Psychiatric/Behavioral:  Negative for altered mental status. The patient is not nervous/anxious.    All other systems reviewed and are negative.      Objective:       General    Constitutional: She is oriented to person, place, and time. She appears well-developed and well-nourished. No distress.   HENT:   Head: Atraumatic.   Eyes: Conjunctivae are normal.   Cardiovascular:  Normal rate.            Pulmonary/Chest: Effort normal.   Neurological: She is alert and oriented to person, place, and time.   Psychiatric: She has a normal mood and affect. Her behavior is normal.         Left Hand/Wrist Exam     Comments:  Splint removed  TTP to distal radius  Decreased range of motion in all planes.   NVI             Physical Exam  Constitutional:       General: She is not in acute distress.     Appearance: She is well-developed and well-nourished. She is not diaphoretic.   HENT:      Head: Atraumatic.   Eyes:      Conjunctiva/sclera: Conjunctivae normal.   Cardiovascular:      Rate and Rhythm: Normal rate.   Pulmonary:      Effort: Pulmonary effort is normal.   Neurological:      Mental Status: She is alert  and oriented to person, place, and time.   Psychiatric:         Mood and Affect: Mood and affect normal.         Behavior: Behavior normal.     RADS: reviewed by myself:     A fracture of the distal left radius is again noted with no detrimental changes in alignment identified compared to the prior radiographs.  Interval callus formation is noted suggesting healing.     Assessment:     Encounter Diagnosis   Name Primary?    Nondisp fx of left radial styloid process, init for clos fx Yes       Plan:      Dicussed plan with the patient. At this time plan is for non-operative treatment .  [facility]. Pain medication, multimodal, ice and elevation. Return to clinic in 4 week at that time x-ray of her wrist is needed OOS.

## 2023-11-24 NOTE — PROGRESS NOTES
Patient presented to clinic due to cast gettiing wet for a change.   Cast removed no skin breakdown.     RADS: Healing fracture of the distal radius identified in a satisfactory position and alignment     Placed into new cast  Will RTC at that time x-ray of her wrist is needed OOC, consider placement into removable and begin OT/PT

## 2023-12-01 ENCOUNTER — OFFICE VISIT (OUTPATIENT)
Dept: ORTHOPEDICS | Facility: CLINIC | Age: 63
End: 2023-12-01
Payer: MEDICARE

## 2023-12-01 ENCOUNTER — TELEPHONE (OUTPATIENT)
Dept: ORTHOPEDICS | Facility: CLINIC | Age: 63
End: 2023-12-01
Payer: MEDICARE

## 2023-12-01 ENCOUNTER — HOSPITAL ENCOUNTER (OUTPATIENT)
Dept: RADIOLOGY | Facility: HOSPITAL | Age: 63
Discharge: HOME | End: 2023-12-01
Attending: PHYSICIAN ASSISTANT
Payer: MEDICARE

## 2023-12-01 VITALS — WEIGHT: 217.13 LBS | BODY MASS INDEX: 32.16 KG/M2 | HEIGHT: 69 IN

## 2023-12-01 DIAGNOSIS — S52.515A NONDISP FX OF LEFT RADIAL STYLOID PROCESS, INIT FOR CLOS FX: Primary | ICD-10-CM

## 2023-12-01 DIAGNOSIS — S52.515A NONDISP FX OF LEFT RADIAL STYLOID PROCESS, INIT FOR CLOS FX: ICD-10-CM

## 2023-12-01 PROCEDURE — 3008F PR BODY MASS INDEX (BMI) DOCUMENTED: ICD-10-PCS | Mod: CPTII,S$GLB,, | Performed by: PHYSICIAN ASSISTANT

## 2023-12-01 PROCEDURE — 99999 PR PBB SHADOW E&M-EST. PATIENT-LVL III: CPT | Mod: PBBFAC,,, | Performed by: PHYSICIAN ASSISTANT

## 2023-12-01 PROCEDURE — 99999 PR PBB SHADOW E&M-EST. PATIENT-LVL III: ICD-10-PCS | Mod: PBBFAC,,, | Performed by: PHYSICIAN ASSISTANT

## 2023-12-01 PROCEDURE — 3008F BODY MASS INDEX DOCD: CPT | Mod: CPTII,S$GLB,, | Performed by: PHYSICIAN ASSISTANT

## 2023-12-01 PROCEDURE — 1159F MED LIST DOCD IN RCRD: CPT | Mod: CPTII,S$GLB,, | Performed by: PHYSICIAN ASSISTANT

## 2023-12-01 PROCEDURE — 73110 XR WRIST COMPLETE 3 VIEWS LEFT: ICD-10-PCS | Mod: 26,LT,, | Performed by: RADIOLOGY

## 2023-12-01 PROCEDURE — 99213 OFFICE O/P EST LOW 20 MIN: CPT | Mod: S$GLB,,, | Performed by: PHYSICIAN ASSISTANT

## 2023-12-01 PROCEDURE — 4010F PR ACE/ARB THEARPY RXD/TAKEN: ICD-10-PCS | Mod: CPTII,S$GLB,, | Performed by: PHYSICIAN ASSISTANT

## 2023-12-01 PROCEDURE — 73110 X-RAY EXAM OF WRIST: CPT | Mod: 26,LT,, | Performed by: RADIOLOGY

## 2023-12-01 PROCEDURE — 1159F PR MEDICATION LIST DOCUMENTED IN MEDICAL RECORD: ICD-10-PCS | Mod: CPTII,S$GLB,, | Performed by: PHYSICIAN ASSISTANT

## 2023-12-01 PROCEDURE — 99213 PR OFFICE/OUTPT VISIT, EST, LEVL III, 20-29 MIN: ICD-10-PCS | Mod: S$GLB,,, | Performed by: PHYSICIAN ASSISTANT

## 2023-12-01 PROCEDURE — 73110 X-RAY EXAM OF WRIST: CPT | Mod: TC,LT,NTX

## 2023-12-01 PROCEDURE — 4010F ACE/ARB THERAPY RXD/TAKEN: CPT | Mod: CPTII,S$GLB,, | Performed by: PHYSICIAN ASSISTANT

## 2023-12-20 ENCOUNTER — TELEPHONE (OUTPATIENT)
Dept: ORTHOPEDICS | Facility: CLINIC | Age: 63
End: 2023-12-20
Payer: MEDICARE

## 2023-12-20 NOTE — TELEPHONE ENCOUNTER
----- Message from Paulina Diaz sent at 12/20/2023 12:51 PM CST -----  Who Called: Patient    What is the request in detail: Requesting call back to discuss her External Physical Therapy referral. Patient explained that at her first visit the provider informed her that she had a $25 copay for each visit. Patient was upset because she did not know about that, she could not afford that for each visit, and remains unseen for Physical Therapy. Patient has been doing simple at home exercises and would like to discuss this with someone soon. Please advise.     Can the clinic reply by [facility]? No    Best Call Back Number: [phone]    Additional Information:

## 2023-12-20 NOTE — TELEPHONE ENCOUNTER
Called and notified pt of Ms Masterson absence. Informed pt I will forward over her message to for Ms Masterson to contact her when she get back in next week. Pt verbally understood and was satisfied.

## 2023-12-26 NOTE — PROGRESS NOTES
Subjective:     Patient ID: Katy Vega is a 63 y.o. female.    Chief Complaint: Follow-up (LEFT WRIST)    HPI    Patient is a 63 year old female who presented to urgent care on 10/26/23 with left wrist pain after falling off her porch.   RADS: There is a distal radial fracture.   Patient treated in a splint. Reports pain is not controlled.     12/07/23:Over all doing ok. Pain is controlled. No numbness or tignling.     Review of Systems   Constitutional: Negative for chills and fever.   Cardiovascular:  Negative for chest pain.   Respiratory:  Negative for cough and shortness of breath.    Skin:  Negative for color change, dry skin, itching, nail changes, poor wound healing and rash.   Musculoskeletal:  Positive for falls.        Left distal radius fracture    Neurological:  Negative for dizziness.   Psychiatric/Behavioral:  Negative for altered mental status. The patient is not nervous/anxious.    All other systems reviewed and are negative.      Objective:       General    Constitutional: She is oriented to person, place, and time. She appears well-developed and well-nourished. No distress.   HENT:   Head: Atraumatic.   Eyes: Conjunctivae are normal.   Cardiovascular:  Normal rate.            Pulmonary/Chest: Effort normal.   Neurological: She is alert and oriented to person, place, and time.   Psychiatric: She has a normal mood and affect. Her behavior is normal.         Left Hand/Wrist Exam     Comments:  Splint removed  TTP to distal radius  Decreased range of motion in all planes.   NVI             Physical Exam  Constitutional:       General: She is not in acute distress.     Appearance: She is well-developed and well-nourished. She is not diaphoretic.   HENT:      Head: Atraumatic.   Eyes:      Conjunctiva/sclera: Conjunctivae normal.   Cardiovascular:      Rate and Rhythm: Normal rate.   Pulmonary:      Effort: Pulmonary effort is normal.   Neurological:      Mental Status: She is alert and  oriented to person, place, and time.   Psychiatric:         Mood and Affect: Mood and affect normal.         Behavior: Behavior normal.     RADS: reviewed by myself:     There is a distal radial fracture.   Assessment:     Encounter Diagnosis   Name Primary?    Nondisp fx of left radial styloid process, init for clos fx Yes       Plan:      Dicussed plan with the patient. At this time plan is for non-operative treatment .  Placed into a  removable brace. Is to be NWB.  PT ordered. Pain medication, multimodal, ice and elevation. Return to clinic in 1 week at that time x-ray of her wrist is needed OOS.

## 2024-01-08 DIAGNOSIS — J84.9 ILD (INTERSTITIAL LUNG DISEASE): Primary | ICD-10-CM

## 2024-01-09 NOTE — PROGRESS NOTES
Subjective:     Patient ID: Katy Vega is a 63 y.o. female.    Chief Complaint: No chief complaint on file.    HPI    Patient is a 63 year old female who presented to urgent care on 10/26/23 with left wrist pain after falling off her porch.   RADS: There is a distal radial fracture.   Patient treated in a splint. Reports pain is not controlled.     12/07/23:Over all doing ok. Pain is controlled. No numbness or tignling.     01/12/23: Stated that sh ei doing ok, btu jhas some stiffness and achy with increased activity.Noticed weakness with heavy lifting like gallon of milk.     Review of Systems   Constitutional: Negative for chills and fever.   Cardiovascular:  Negative for chest pain.   Respiratory:  Negative for cough and shortness of breath.    Skin:  Negative for color change, dry skin, itching, nail changes, poor wound healing and rash.   Musculoskeletal:  Positive for falls.        Left distal radius fracture    Neurological:  Negative for dizziness.   Psychiatric/Behavioral:  Negative for altered mental status. The patient is not nervous/anxious.    All other systems reviewed and are negative.      Objective:       General    Constitutional: She is oriented to person, place, and time. She appears well-developed and well-nourished. No distress.   HENT:   Head: Atraumatic.   Eyes: Conjunctivae are normal.   Cardiovascular:  Normal rate.            Pulmonary/Chest: Effort normal.   Neurological: She is alert and oriented to person, place, and time.   Psychiatric: She has a normal mood and affect. Her behavior is normal.         Left Hand/Wrist Exam     Comments:  Brace  TTP to thumb extender  Full range of motion in all planes wrist anfd figners  NVI             Physical Exam  Constitutional:       General: She is not in acute distress.     Appearance: She is well-developed and well-nourished. She is not diaphoretic.   HENT:      Head: Atraumatic.   Eyes:      Conjunctiva/sclera: Conjunctivae normal.    Cardiovascular:      Rate and Rhythm: Normal rate.   Pulmonary:      Effort: Pulmonary effort is normal.   Neurological:      Mental Status: She is alert and oriented to person, place, and time.   Psychiatric:         Mood and Affect: Mood and affect normal.         Behavior: Behavior normal.     RADS: reviewed by myself:   Healing fracture of the distal radius identified in a satisfactory position and alignment     Assessment:     Encounter Diagnosis   Name Primary?    Nondisp fx of left radial styloid process, init for clos fx Yes       Plan:      Dicussed plan with the patient. At this time plan is for non-operative treatment .  Wean the  removable brace. ROAMT  slowly increase WB.  . Pain medication,OTCl, ice and elevation. Return to clinic if any increase in pain  at that time x-ray of her wrist is needed OOS.

## 2024-01-12 ENCOUNTER — HOSPITAL ENCOUNTER (OUTPATIENT)
Dept: RADIOLOGY | Facility: HOSPITAL | Age: 64
Discharge: HOME | End: 2024-01-12
Attending: PHYSICIAN ASSISTANT
Payer: MEDICARE

## 2024-01-12 ENCOUNTER — OFFICE VISIT (OUTPATIENT)
Dept: ORTHOPEDICS | Facility: CLINIC | Age: 64
End: 2024-01-12
Payer: MEDICARE

## 2024-01-12 VITALS — HEIGHT: 69 IN | WEIGHT: 217.13 LBS | BODY MASS INDEX: 32.16 KG/M2

## 2024-01-12 DIAGNOSIS — S52.515A NONDISP FX OF LEFT RADIAL STYLOID PROCESS, INIT FOR CLOS FX: ICD-10-CM

## 2024-01-12 DIAGNOSIS — S52.515A NONDISP FX OF LEFT RADIAL STYLOID PROCESS, INIT FOR CLOS FX: Primary | ICD-10-CM

## 2024-01-12 PROCEDURE — 1159F MED LIST DOCD IN RCRD: CPT | Mod: CPTII,S$GLB,, | Performed by: PHYSICIAN ASSISTANT

## 2024-01-12 PROCEDURE — 3008F BODY MASS INDEX DOCD: CPT | Mod: CPTII,S$GLB,, | Performed by: PHYSICIAN ASSISTANT

## 2024-01-12 PROCEDURE — 99999 PR PBB SHADOW E&M-EST. PATIENT-LVL II: CPT | Mod: PBBFAC,,, | Performed by: PHYSICIAN ASSISTANT

## 2024-01-12 PROCEDURE — 99213 OFFICE O/P EST LOW 20 MIN: CPT | Mod: S$GLB,,, | Performed by: PHYSICIAN ASSISTANT

## 2024-01-12 PROCEDURE — 73110 X-RAY EXAM OF WRIST: CPT | Mod: TC,LT,TXP

## 2024-01-12 PROCEDURE — 73110 X-RAY EXAM OF WRIST: CPT | Mod: 26,LT,, | Performed by: RADIOLOGY

## 2024-01-22 ENCOUNTER — OFFICE VISIT (OUTPATIENT)
Dept: OBSTETRICS AND GYNECOLOGY | Facility: CLINIC | Age: 64
End: 2024-01-22
Payer: MEDICARE

## 2024-01-22 VITALS
HEIGHT: 69 IN | SYSTOLIC BLOOD PRESSURE: 132 MMHG | BODY MASS INDEX: 34.48 KG/M2 | WEIGHT: 232.81 LBS | DIASTOLIC BLOOD PRESSURE: 74 MMHG

## 2024-01-22 DIAGNOSIS — N87.1 DYSPLASIA OF CERVIX, HIGH GRADE CIN 2: Primary | ICD-10-CM

## 2024-01-22 PROCEDURE — 88175 CYTOPATH C/V AUTO FLUID REDO: CPT | Performed by: PATHOLOGY

## 2024-01-22 PROCEDURE — 99999 PR PBB SHADOW E&M-EST. PATIENT-LVL II: CPT | Mod: PBBFAC,,, | Performed by: OBSTETRICS & GYNECOLOGY

## 2024-01-22 PROCEDURE — 99213 OFFICE O/P EST LOW 20 MIN: CPT | Mod: S$GLB,,, | Performed by: OBSTETRICS & GYNECOLOGY

## 2024-01-22 PROCEDURE — 3078F DIAST BP <80 MM HG: CPT | Mod: CPTII,S$GLB,, | Performed by: OBSTETRICS & GYNECOLOGY

## 2024-01-22 PROCEDURE — 3008F BODY MASS INDEX DOCD: CPT | Mod: CPTII,S$GLB,, | Performed by: OBSTETRICS & GYNECOLOGY

## 2024-01-22 PROCEDURE — 87624 HPV HI-RISK TYP POOLED RSLT: CPT | Performed by: OBSTETRICS & GYNECOLOGY

## 2024-01-22 PROCEDURE — 1160F RVW MEDS BY RX/DR IN RCRD: CPT | Mod: CPTII,S$GLB,, | Performed by: OBSTETRICS & GYNECOLOGY

## 2024-01-22 PROCEDURE — 1159F MED LIST DOCD IN RCRD: CPT | Mod: CPTII,S$GLB,, | Performed by: OBSTETRICS & GYNECOLOGY

## 2024-01-22 PROCEDURE — 3075F SYST BP GE 130 - 139MM HG: CPT | Mod: CPTII,S$GLB,, | Performed by: OBSTETRICS & GYNECOLOGY

## 2024-01-22 PROCEDURE — 88141 CYTOPATH C/V INTERPRET: CPT | Mod: ,,, | Performed by: PATHOLOGY

## 2024-01-22 NOTE — PROGRESS NOTES
Chief Complaint   Patient presents with    Follow-up       HPI:   63 y.o.  here today for repeat pap smear. She has a history of BIANKA 2 s/p LEEP 2023 performed by me. Had a supracervical hysterectomy for benign reasons in . She is doing well today with no complaints or concerns and denies significant changes to medical or surgical history.     Pap: ASCUS/HPV HR+   Colpo (2023): 12:00 BIANKA 1-2, ECC BIANKA 1-2  LEEP (2023): BIANKA 1 with negative margins; ECC insufficient     Labs / Significant Studies      No visits with results within 3 Month(s) from this visit.   Latest known visit with results is:   Hospital Outpatient Visit on 2023   Component Date Value Ref Range Status    Physician Comment 2023    Final                    Value:Spirometry shows a reduced vital capacity suggesting restriction. Lung volumes show moderate restriction is present. Airway mechanics are abnormal showing increased airway resistance and decreased conductance. DLCO is moderately decreased.   Â   Notes: No recent hemoglobin value available. DLCO interpretation assumes a normal hemoglobin value.       Pre FVC 2023 1.78 (L)  2.29 - 4.02 L Final    PRE FEV5 2023 1.14 (L)  1.20 - 2.91 L Final    Pre FEV1 2023 1.39 (L)  1.77 - 3.11 L Final    Pre FEV1 FVC 2023 77.85  66.99 - 89.03 % Final    Pre FEF 25 75 2023 1.29  0.89 - 3.92 L/s Final    Pre PEF 2023 2.92 (L)  3.98 - 8.64 L/s Final    Pre  2023 6.31  sec Final    Pre DLCO 2023 11.98 (L)  19.82 - 31.29 ml/(min*mmHg) Final    DLCOVA PRE 2023 4.53  3.17 - 5.67 ml/(min*mmHg*L) Final    VA PRE 2023 2.64 (L)  5.63 - 5.63 L Final    IVC PRE 2023 1.57 (L)  2.29 - 4.02 L Final    Pre TLC 2023 3.10 (L)  4.79 - 6.76 L Final    VC PRE 2023 1.78 (L)  2.29 - 4.02 L Final    PRE IC 2023 1.49  -93605.42 - 49382.58 L Final    Pre FRC PL 2023 1.61 (L)  2.17 - 3.81 L Final    Pre ERV  08/31/2023 0.30  -57155.18 - 09961.82 L Final    Pre RV 08/31/2023 1.32 (L)  1.59 - 2.74 L Final    RVTLC PRE 08/31/2023 42.44  30.45 - 49.63 % Final    Raw PRE 08/31/2023 6.58 (H)  3.06 - 3.06 cmH2O*s/L Final    sGaw PRE 08/31/2023 0.08 (L)  0.10 - 0.10 1/(cmH2O*s) Final    FVC Ref 08/31/2023 3.14   Final    FVC LLN 08/31/2023 2.29   Final    FVC Pre Ref 08/31/2023 56.9  % Final    FEV05 REF 08/31/2023 2.06   Final    FEV05 LLN 08/31/2023 1.20   Final    PRE FEV05 REF 08/31/2023 55.2  % Final    FEV1 Ref 08/31/2023 2.46   Final    FEV1 LLN 08/31/2023 1.77   Final    FEV1 Pre Ref 08/31/2023 56.6  % Final    FEV1 FVC Ref 08/31/2023 79   Final    FEV1 FVC LLN 08/31/2023 67   Final    FEV1 FVC Pre Ref 08/31/2023 98.7  % Final    FEF 25 75 Ref 08/31/2023 2.13   Final    FEF 25 75 LLN 08/31/2023 0.89   Final    FEF 25 75 Pre Ref 08/31/2023 60.6  % Final    PEF Ref 08/31/2023 6.31   Final    PEF LLN 08/31/2023 3.98   Final    PEF Pre Ref 08/31/2023 46.3  % Final    TLC Ref 08/31/2023 5.78   Final    TLC LLN 08/31/2023 4.79   Final    TLC ULN 08/31/2023 6.76   Final    TLC Pre Ref 08/31/2023 53.7  % Final    VC Ref 08/31/2023 3.14   Final    VC LLN 08/31/2023 2.29   Final    VC ULN 08/31/2023 4.02   Final    VC Pre Ref 08/31/2023 56.9  % Final    REF IC 08/31/2023 2.58   Final    LLN IC 08/31/2023 -00050.42   Final    ULN IC 08/31/2023 18418.58   Final    PRE REF IC 08/31/2023 57.8  % Final    FRCpleth Ref 08/31/2023 2.99   Final    FRCpleth LLN 08/31/2023 2.17   Final    FRC PLETH ULN 08/31/2023 3.81   Final    FRCpleth PreRef 08/31/2023 53.9  % Final    ERV Ref 08/31/2023 0.82   Final    ERV LLN 08/31/2023 -58352.18   Final    ERV ULN 08/31/2023 52689.82   Final    ERV Pre Ref 08/31/2023 35.9  % Final    RV Ref 08/31/2023 2.16   Final    RV LLN 08/31/2023 1.59   Final    RV ULN 08/31/2023 2.74   Final    RV Pre Ref 08/31/2023 60.8  % Final    RVTLC Ref 08/31/2023 40   Final    RVTLC LLN 08/31/2023 30   Final    RV TLC  ULN 08/31/2023 50   Final    RVTLC Pre Ref 08/31/2023 106.0  % Final    Raw Ref 08/31/2023 3.06   Final    Raw Pre Ref 08/31/2023 215.0  % Final    sGaw Ref 08/31/2023 0.10   Final    sGaw Pre Ref 08/31/2023 79.8  % Final    DLCO Single Breath Ref 08/31/2023 25.55   Final    DLCO Single Breath LLN 08/31/2023 19.82   Final    DLCO SINGLEBREATH ULN 08/31/2023 31.29   Final    DLCO Single Breath Pre Ref 08/31/2023 46.9  % Final    DLCOc Single Breath Ref 08/31/2023 25.55   Final    DLCOc Single Breath LLN 08/31/2023 19.82   Final    DLCOC SINGLEBREATH ULN 08/31/2023 31.29   Final    DLCOVA Ref 08/31/2023 4.42   Final    DLCOVA LLN 08/31/2023 3.17   Final    DLCOVA ULN 08/31/2023 5.67   Final    DLCOVA Pre Ref 08/31/2023 102.5  % Final    DLCOc SBVA Ref 08/31/2023 4.42   Final    DLCOc SBVA LLN 08/31/2023 3.17   Final    DLCOCSBVA ULN 08/31/2023 5.67   Final    VA Single Breath Ref 08/31/2023 5.63   Final    VA Single Breath LLN 08/31/2023 5.63   Final    VA SINGLEBREATH ULN 08/31/2023 5.63   Final    VA Single Breath Pre Ref 08/31/2023 47.0  % Final    IVC Single Breath Ref 08/31/2023 3.14   Final    IVC Single Breath LLN 08/31/2023 2.29   Final    IVC SINGLEBREATH ULN 08/31/2023 4.02   Final    IVC Single Breath Pre Ref 08/31/2023 49.9  % Final        Past Medical History:   Diagnosis Date    Aneurysm     Arthritis     Back pain at L4-L5 level     accident    Breast cancer     Left breast    Chronic back pain     COPD (chronic obstructive pulmonary disease)     Hypertension     Lupus     Raynaud disease     Seizures     Stroke 12/2012     Past Surgical History:   Procedure Laterality Date    BREAST SURGERY      BREAST SURGERY Left 03/2020    stage 1 breast cancer    CERVICAL BIOPSY  W/ LOOP ELECTRODE EXCISION  07/2023    LEEP    NERVE SURGERY      PARTIAL HYSTERECTOMY  2002       Current Outpatient Medications:     acyclovir (ZOVIRAX) 400 MG tablet, TAKE 1 TABLET BY MOUTH THREE TIMES DAILY FOR 5 DAYS, Disp: , Rfl:      ALBUTEROL SULFATE (PROVENTIL HFA INHL), Inhale 1-2 puffs into the lungs as needed. , Disp: , Rfl:     amlodipine (NORVASC) 10 MG tablet, Take 10 mg by mouth once daily., Disp: , Rfl:     amLODIPine (NORVASC) 5 MG tablet, Take 1 tablet by mouth once daily., Disp: , Rfl:     budesonide-formoterol 160-4.5 mcg (SYMBICORT) 160-4.5 mcg/actuation HFAA, Inhale 2 puffs into the lungs every 12 (twelve) hours., Disp: , Rfl:     cephALEXin (KEFLEX) 500 MG capsule, Take 1 capsule by mouth 2 (two) times daily., Disp: , Rfl:     dexbrompheniramine-phenylep-DM 2-7.5-15 mg/5 mL Liqd, TAKE 5 ML BY MOUTH EVERY 8 HOURS FOR 6 DAYS AS NEEDED FOR COUGH, Disp: , Rfl:     diazePAM (VALIUM) 5 MG tablet, TAKE 1 TABLET BY MOUTH 45 MINUTES PRIOR TO PROCEDURE, Disp: , Rfl:     diclofenac sodium (VOLTAREN) 1 % Gel, Apply 2 g topically as needed. , Disp: , Rfl:     duloxetine (CYMBALTA) 60 MG capsule, Take 60 mg by mouth once daily. , Disp: , Rfl:     gabapentin (NEURONTIN) 300 MG capsule, Take 600 mg by mouth 2 (two) times daily., Disp: , Rfl:     hydrocodone-acetaminophen 10-325mg (NORCO)  mg Tab, Take 1 tablet by mouth every 8 (eight) hours as needed for Pain., Disp: , Rfl:     hydroxychloroquine (PLAQUENIL) 200 mg tablet, Take 200 mg by mouth 2 (two) times daily., Disp: , Rfl:     levETIRAcetam (KEPPRA) 250 MG Tab, TK 1 T PO BID, Disp: , Rfl:     levETIRAcetam (KEPPRA) 500 MG Tab, , Disp: , Rfl:     LIDOcaine (LIDODERM) 5 %, APPLY 1 PATCH EXTERNALLY TO SKIN ONCE DAILY AS NEEDED FOR PAIN. REMOVE AFTER 12 HOURS, Disp: , Rfl:     lisinopriL (PRINIVIL,ZESTRIL) 20 MG tablet, Take 1 tablet by mouth once daily., Disp: , Rfl:     meloxicam (MOBIC) 7.5 MG tablet, Take 1 tablet by mouth once daily., Disp: , Rfl:     metroNIDAZOLE (FLAGYL) 500 MG tablet, TK 1 T PO BID FOR 7 DAYS, Disp: , Rfl:     mometasone (NASONEX) 50 mcg/actuation nasal spray, 2 sprays by Nasal route daily as needed. , Disp: , Rfl:     naproxen (NAPROSYN) 375 MG tablet, , Disp:  , Rfl:     ondansetron (ZOFRAN-ODT) 8 MG TbDL, DISSOLVE 1 TABLET ON THE TONGUE EVERY 8 HOURS AS NEEDED FOR NAUSEA, Disp: , Rfl:     oxyCODONE-acetaminophen (PERCOCET)  mg per tablet, Take 1 tablet by mouth 4 (four) times daily as needed., Disp: , Rfl:     polyethylene glycol (GOLYTELY) 236-22.74-6.74 -5.86 gram suspension, , Disp: , Rfl:     semaglutide (OZEMPIC) 0.25 mg or 0.5 mg(2 mg/1.5 mL) pen injector, INJECT 0.5MG INTO THE SKIN EVERY 7 DAYS FOR 4 DOSES, Disp: , Rfl:     semaglutide (OZEMPIC) 1 mg/dose (2 mg/1.5 mL) PnIj, ADMINISTER 1 MG UNDER THE SKIN EVERY 7 DAYS, Disp: , Rfl:     silver sulfADIAZINE 1% (SILVADENE) 1 % cream, Apply 1 application topically 3 (three) times a week., Disp: , Rfl:     triamcinolone acetonide 0.1% (KENALOG) 0.1 % cream, Apply layer to site before silvadene twice daily for radiation dermatitis/burn, Disp: , Rfl:   Review of patient's allergies indicates:  No Known Allergies  OB History    Para Term  AB Living   2 1 1   1 1   SAB IAB Ectopic Multiple Live Births   1       1      # Outcome Date GA Lbr Estiven/2nd Weight Sex Delivery Anes PTL Lv   2 2000           1 Term 12/    M Vag-Spont   MELLY     Social History     Tobacco Use    Smoking status: Never     Passive exposure: Past    Smokeless tobacco: Never   Substance Use Topics    Alcohol use: Yes     Comment: very occasional - wine    Drug use: Never     Family History   Problem Relation Age of Onset    Lupus Sister     Lung cancer Brother     Breast cancer Sister     Breast cancer Sister     Lung cancer Brother        Review of Systems   Negative except as in HPI      Physical Exam   Vitals:    24 0838   BP: 132/74     Body mass index is 34.38 kg/m².    Physical Exam  Constitutional:       General: She is not in acute distress.     Appearance: Normal appearance.     Genitourinary:    Vulva, vagina, right adnexa, left adnexa and urethral meatus normal.   The external female genitalia was normal.   No  external genitalia lesions identified,Genitalia hair distrobution normal .         No vaginal discharge or bleeding in the vagina.    No vaginal prolapse present.     No vaginal atrophy present.  Right adnexum displays no mass, no tenderness and no fullness. Left adnexum displays no mass, no tenderness and no fullness. Cervix is normal. Cervix exhibits no motion tenderness and no lesion. Uterus is absent. HENT:      Head: Normocephalic and atraumatic.   Eyes:      Extraocular Movements: Extraocular movements intact.      Pupils: Pupils are equal, round, and reactive to light.   Pulmonary:      Effort: Pulmonary effort is normal.   Abdominal:      General: Abdomen is flat. There is no distension.      Palpations: Abdomen is soft.      Tenderness: There is no abdominal tenderness. There is no guarding or rebound.   Musculoskeletal:         General: Normal range of motion.      Cervical back: Normal range of motion.   Neurological:      General: No focal deficit present.      Mental Status: She is alert and oriented to person, place, and time.   Skin:     General: Skin is warm and dry.   Psychiatric:         Mood and Affect: Mood normal.         Behavior: Behavior normal.         Thought Content: Thought content normal.   Vitals reviewed.        Labs reviewed: Pap, HPV, colposcopy path, LEEP    ASSESSMENT:   Patient Active Problem List   Diagnosis    ILD (interstitial lung disease)    Lung transplant candidate    Class 1 obesity due to excess calories with serious comorbidity and body mass index (BMI) of 32.0 to 32.9 in adult    Hypertension    Systemic lupus erythematosus with lung involvement    History of breast cancer    ASCUS with positive high risk HPV cervical    Status post LEEP (loop electrosurgical excision procedure) of cervix    Dysplasia of cervix, high grade BIANKA 2       PLAN:  Problem List Items Addressed This Visit          Renal/    Dysplasia of cervix, high grade BIANKA 2 - Primary    Current Assessment  & Plan     S/p LEEP which showed BIANKA 1 with negative margins. Repeat pap/cotesting collected today. Per ASCCP guidelines, repeat pap in 6 and 12 months, then yearly x3 years, then q3 years for 25 years.          Relevant Orders    Liquid-Based Pap Smear, Screening (Completed)    HPV High Risk Genotypes, PCR (Completed)        Total time spent on this encounter was 20 minutes.  This includes preparing to see the patient;  obtaining/reviewing separately obtained history;  performing a medical exam and/or evaluation;   counseling/educating the patient;  ordering medications, tests, of procedures;   referring/communicating with other health care professionals;  EMR documentation;  interpreting/communicating results to the patient;  and/or care coordination.    Follow up in about 1 year (around 1/22/2025) for Annual.       Lilian Ellington MD  Department of Obstetrics & Gynecology  [facility]

## 2024-01-24 LAB
HPV HR 12 DNA SPEC QL NAA+PROBE: POSITIVE
HPV16 AG SPEC QL: NEGATIVE
HPV18 DNA SPEC QL NAA+PROBE: NEGATIVE

## 2024-01-25 LAB
FINAL PATHOLOGIC DIAGNOSIS: ABNORMAL
Lab: ABNORMAL

## 2024-02-08 NOTE — ASSESSMENT & PLAN NOTE
S/p LEEP which showed BIANKA 1 with negative margins. Repeat pap/cotesting collected today. Per ASCCP guidelines, repeat pap in 6 and 12 months, then yearly x3 years, then q3 years for 25 years.

## 2024-02-26 ENCOUNTER — TELEPHONE (OUTPATIENT)
Dept: TRANSPLANT | Facility: CLINIC | Age: 64
End: 2024-02-26
Payer: MEDICARE

## 2024-03-06 ENCOUNTER — DOCUMENTATION ONLY (OUTPATIENT)
Dept: TRANSPLANT | Facility: CLINIC | Age: 64
End: 2024-03-06
Payer: MEDICARE

## 2024-03-06 NOTE — PROGRESS NOTES
Patient did not show for her scheduled appointment on 02/27/24. Letter sent to patient regarding her missed appointment.

## 2024-03-06 NOTE — LETTER
March 6, 2024    [address] [place] [id number]      Dear Katy Vega:    Patient: Katy Vega   MR Number: [id number]   YOB: 1960     We hope this letter finds you well. You missed your appointment in the lung transplant department that was scheduled for 02/27/2024. Please contact us at [phone] to re-schedule your appointment.                                                                               Sincerely,     Rosa Maria Li D.O.  Medical Director, Lung Transplant  Pulmonary & Critical Care Medicine  [facility]  [address] [place] [id number]  [phone]

## 2024-03-07 ENCOUNTER — TELEPHONE (OUTPATIENT)
Dept: TRANSPLANT | Facility: CLINIC | Age: 64
End: 2024-03-07
Payer: MEDICARE

## 2024-03-07 NOTE — PROGRESS NOTES
S/p LEEP with BIANKA 1 and negative margins. Now with LSIL/HPV HR+ other. Repeat pap/ECC in 6 months. Patient called and notified of abnormal result and need for repeat pap/ECC.

## 2024-03-12 ENCOUNTER — TELEPHONE (OUTPATIENT)
Dept: TRANSPLANT | Facility: CLINIC | Age: 64
End: 2024-03-12
Payer: MEDICARE

## 2024-03-12 NOTE — TELEPHONE ENCOUNTER
LM for Ms. Concepcion - wanting to know if she wants to reschedule her February appts.   ----- Message from Valarie Zhao sent at 3/12/2024  8:56 AM CDT -----  Regarding: Missed Call  Contact: [phone]  Returning a Missed Call         Caller: ADRIAN CONCEPCION [[id number]]           Returning call to:  Missed call last week, unsure who called           Caller can be reached @:   [phone]         Nature of the call:  Pt is returning a missed call from this dept last week in regards to her appts she missed on 02/27/2024.

## 2024-04-11 ENCOUNTER — HOSPITAL ENCOUNTER (EMERGENCY)
Facility: OTHER | Age: 64
Discharge: HOME | End: 2024-04-11
Attending: EMERGENCY MEDICINE
Payer: MEDICARE

## 2024-04-11 VITALS
HEIGHT: 71 IN | BODY MASS INDEX: 29.4 KG/M2 | HEART RATE: 77 BPM | TEMPERATURE: 98 F | DIASTOLIC BLOOD PRESSURE: 73 MMHG | RESPIRATION RATE: 18 BRPM | WEIGHT: 210 LBS | OXYGEN SATURATION: 97 % | SYSTOLIC BLOOD PRESSURE: 161 MMHG

## 2024-04-11 DIAGNOSIS — S93.602A FOOT SPRAIN, LEFT, INITIAL ENCOUNTER: Primary | ICD-10-CM

## 2024-04-11 DIAGNOSIS — T14.90XA INJURY: ICD-10-CM

## 2024-04-11 PROCEDURE — 99283 EMERGENCY DEPT VISIT LOW MDM: CPT | Mod: 25,NTX
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
## 2024-04-11 NOTE — ED TRIAGE NOTES
Pt reports to ED with left foot pain after slipping and falling in her house yesterday. Reports she braced her fall by landing on her left arm and injuring her left foot. No obvious deformity noted. Pt reports she is unable to bear weight on the foot. Pt reports she takes pain medication at home for chronic back pain. Aaox4.

## 2024-04-12 ENCOUNTER — TELEPHONE (OUTPATIENT)
Dept: TRANSPLANT | Facility: CLINIC | Age: 64
End: 2024-04-12
Payer: MEDICARE

## 2024-04-29 ENCOUNTER — TELEPHONE (OUTPATIENT)
Dept: TRANSPLANT | Facility: CLINIC | Age: 64
End: 2024-04-29
Payer: MEDICARE

## 2024-04-29 NOTE — TELEPHONE ENCOUNTER
Left voicemail for patient about r/s upcoming appointments on today to 5/15.    ----- Message from Analia Medel sent at 4/29/2024  7:58 AM CDT -----  Regarding: Reschedule Appts  Contact: Patient  Patient is scheduled for a complete pft and to see physician on 04/29/2024   Patient stated she injured her foot and is unable to complete pft and would like a call back to reschedule all appts   Please Assist     Patient can be reached at  [phone]

## 2024-05-15 ENCOUNTER — OFFICE VISIT (OUTPATIENT)
Dept: TRANSPLANT | Facility: CLINIC | Age: 64
End: 2024-05-15
Payer: MEDICARE

## 2024-05-15 ENCOUNTER — HOSPITAL ENCOUNTER (OUTPATIENT)
Dept: PULMONOLOGY | Facility: CLINIC | Age: 64
Discharge: HOME | End: 2024-05-15
Payer: MEDICARE

## 2024-05-15 VITALS
DIASTOLIC BLOOD PRESSURE: 63 MMHG | BODY MASS INDEX: 34.48 KG/M2 | RESPIRATION RATE: 16 BRPM | SYSTOLIC BLOOD PRESSURE: 141 MMHG | HEIGHT: 69 IN | WEIGHT: 232.81 LBS | TEMPERATURE: 99 F

## 2024-05-15 VITALS — WEIGHT: 232.81 LBS | HEIGHT: 69 IN | BODY MASS INDEX: 34.48 KG/M2

## 2024-05-15 DIAGNOSIS — M32.13 SYSTEMIC LUPUS ERYTHEMATOSUS WITH LUNG INVOLVEMENT, UNSPECIFIED SLE TYPE: ICD-10-CM

## 2024-05-15 DIAGNOSIS — J84.9 ILD (INTERSTITIAL LUNG DISEASE): ICD-10-CM

## 2024-05-15 DIAGNOSIS — J84.9 ILD (INTERSTITIAL LUNG DISEASE): Primary | ICD-10-CM

## 2024-05-15 DIAGNOSIS — E66.9 OBESITY (BMI 30-39.9): ICD-10-CM

## 2024-05-15 DIAGNOSIS — Z85.3 HISTORY OF BREAST CANCER: ICD-10-CM

## 2024-05-15 DIAGNOSIS — M35.1 MCTD (MIXED CONNECTIVE TISSUE DISEASE): ICD-10-CM

## 2024-05-15 LAB
DLCO ADJ PRE: 13.44 ML/(MIN*MMHG) (ref 19.68–31.14)
DLCO SINGLE BREATH LLN: 19.68
DLCO SINGLE BREATH PRE REF: 49.7 %
DLCO SINGLE BREATH REF: 25.41
DLCOC SBVA LLN: 3.15
DLCOC SBVA PRE REF: 122 %
DLCOC SBVA REF: 4.4
DLCOC SINGLE BREATH LLN: 19.68
DLCOC SINGLE BREATH PRE REF: 52.9 %
DLCOC SINGLE BREATH REF: 25.41
DLCOCSBVAULN: 5.64
DLCOCSINGLEBREATHULN: 31.14
DLCOSINGLEBREATHULN: 31.14
DLCOVA LLN: 3.15
DLCOVA PRE REF: 114.7 %
DLCOVA PRE: 5.04 ML/(MIN*MMHG*L) (ref 3.15–5.64)
DLCOVA REF: 4.4
DLCOVAULN: 5.64
DLVAADJ PRE: 5.37 ML/(MIN*MMHG*L) (ref 3.15–5.64)
ERV LLN: -16449.19
ERV PRE REF: 53.3 %
ERV REF: 0.81
ERVULN: ABNORMAL
FEF 25 75 LLN: 0.87
FEF 25 75 PRE REF: 61.5 %
FEF 25 75 REF: 2.1
FEV05 LLN: 1.19
FEV05 REF: 2.04
FEV1 FVC LLN: 67
FEV1 FVC PRE REF: 96.9 %
FEV1 FVC REF: 79
FEV1 LLN: 1.75
FEV1 PRE REF: 54.8 %
FEV1 REF: 2.44
FRCPLETH LLN: 2.17
FRCPLETH PREREF: 59.1 %
FRCPLETH REF: 2.99
FRCPLETHULN: 3.81
FVC LLN: 2.27
FVC PRE REF: 56.1 %
FVC REF: 3.12
IVC PRE: 1.51 L (ref 2.27–4)
IVC SINGLE BREATH LLN: 2.27
IVC SINGLE BREATH PRE REF: 48.5 %
IVC SINGLE BREATH REF: 3.12
IVCSINGLEBREATHULN: 4
LLN IC: -16447.43
PEF LLN: 3.91
PEF PRE REF: 56.2 %
PEF REF: 6.24
PHYSICIAN COMMENT: ABNORMAL
PRE DLCO: 12.64 ML/(MIN*MMHG) (ref 19.68–31.14)
PRE ERV: 0.43 L (ref -16449.19–16450.81)
PRE FEF 25 75: 1.29 L/S (ref 0.87–3.89)
PRE FET 100: 6.19 SEC
PRE FEV05 REF: 53.3 %
PRE FEV1 FVC: 76.35 % (ref 66.83–89.03)
PRE FEV1: 1.33 L (ref 1.75–3.09)
PRE FEV5: 1.09 L (ref 1.19–2.9)
PRE FRC PL: 1.77 L (ref 2.17–3.81)
PRE FVC: 1.75 L (ref 2.27–4)
PRE IC: 1.32 L (ref -16447.43–16452.57)
PRE PEF: 3.51 L/S (ref 3.91–8.57)
PRE REF IC: 51.3 %
PRE RV: 1.34 L (ref 1.6–2.76)
PRE TLC: 3.08 L (ref 4.79–6.76)
RAW PRE REF: 149 %
RAW PRE: 4.56 CMH2O*S/L (ref 3.06–3.06)
RAW REF: 3.06
REF IC: 2.57
RV LLN: 1.6
RV PRE REF: 61.3 %
RV REF: 2.18
RVTLC LLN: 31
RVTLC PRE REF: 107.3 %
RVTLC PRE: 43.35 % (ref 30.79–49.97)
RVTLC REF: 40
RVTLCULN: 50
RVULN: 2.76
SGAW PRE REF: 103.3 %
SGAW PRE: 0.11 1/(CMH2O*S) (ref 0.1–0.1)
SGAW REF: 0.1
TLC LLN: 4.79
TLC PRE REF: 53.4 %
TLC REF: 5.78
TLC ULN: 6.76
ULN IC: ABNORMAL
VA PRE: 2.5 L (ref 5.63–5.63)
VA SINGLE BREATH LLN: 5.63
VA SINGLE BREATH PRE REF: 44.5 %
VA SINGLE BREATH REF: 5.63
VASINGLEBREATHULN: 5.63
VC LLN: 2.27
VC PRE REF: 56.1 %
VC PRE: 1.75 L (ref 2.27–4)
VC REF: 3.12
VC ULN: 4

## 2024-05-15 PROCEDURE — 94010 BREATHING CAPACITY TEST: CPT | Mod: 59,NTX,S$GLB, | Performed by: INTERNAL MEDICINE

## 2024-05-15 PROCEDURE — 3008F BODY MASS INDEX DOCD: CPT | Mod: CPTII,NTX,S$GLB, | Performed by: NURSE PRACTITIONER

## 2024-05-15 PROCEDURE — 1160F RVW MEDS BY RX/DR IN RCRD: CPT | Mod: CPTII,NTX,S$GLB, | Performed by: NURSE PRACTITIONER

## 2024-05-15 PROCEDURE — 3077F SYST BP >= 140 MM HG: CPT | Mod: CPTII,NTX,S$GLB, | Performed by: NURSE PRACTITIONER

## 2024-05-15 PROCEDURE — 1159F MED LIST DOCD IN RCRD: CPT | Mod: CPTII,NTX,S$GLB, | Performed by: NURSE PRACTITIONER

## 2024-05-15 PROCEDURE — 99999 PR PBB SHADOW E&M-EST. PATIENT-LVL III: CPT | Mod: PBBFAC,TXP,, | Performed by: NURSE PRACTITIONER

## 2024-05-15 PROCEDURE — 94726 PLETHYSMOGRAPHY LUNG VOLUMES: CPT | Mod: NTX,S$GLB,, | Performed by: INTERNAL MEDICINE

## 2024-05-15 PROCEDURE — 3044F HG A1C LEVEL LT 7.0%: CPT | Mod: CPTII,NTX,S$GLB, | Performed by: NURSE PRACTITIONER

## 2024-05-15 PROCEDURE — 99214 OFFICE O/P EST MOD 30 MIN: CPT | Mod: 25,NTX,S$GLB, | Performed by: NURSE PRACTITIONER

## 2024-05-15 PROCEDURE — 94729 DIFFUSING CAPACITY: CPT | Mod: NTX,S$GLB,, | Performed by: INTERNAL MEDICINE

## 2024-05-15 PROCEDURE — 3078F DIAST BP <80 MM HG: CPT | Mod: CPTII,NTX,S$GLB, | Performed by: NURSE PRACTITIONER

## 2024-05-15 PROCEDURE — 94618 PULMONARY STRESS TESTING: CPT | Mod: NTX,S$GLB,, | Performed by: INTERNAL MEDICINE

## 2024-05-15 NOTE — PROGRESS NOTES
ADVANCED LUNG DISEASE FOLLOW-UP    Referring Physician: Nishith M. 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 She is on no assisted ventilation.  Her New [place] Heart Association Class is II and a Karnofsky score of 80% - Normal activity with effort: some symptoms of disease. She is not diabetic.     Patient presents today for routine follow up for MCTD-ILD.  Since her last visit, she has been doing well. She previously underwent breast reconstruction surgery, but has to have it redone.  She does not have a date for the new surgery. She is active as possible and voices no shortness of breath.  Denies any other concerns.  She is tolerating her medications well. States that she has gained weight and is planning on going back to the gym.      Brief history referring to HPI from July 2019  Patient reports LEPE for years starting in [id number].  At that time, she also noticed increasing fatigue with daily activities.  She was first evaluated at [facility] in 2006 when she evacuated after [place].  She states at that time she was diagnosed with COPD and ILD.  Further work-up showed positive autoimmune serologies and she was diagnosed with Lupus.  She was started on Plaquenil and Prednisone.  She then had intermittent follow-up until she moved back to [place] in 2012.  At that time, she established with Rheumatology at  and was again started on Plaquenil and Prednisone which she remained on until a few months ago.  She then began following with [facility].  CT chest showed evidence of GGOs and fibrosis which have been stable on  imaging.  They noticed a decrease in her FVC and referred her to [facility] who have since stopped the Prednisone, continued her on Plaquenil, and added MMF.  She has been unable to tolerate more than 1500mg per day due to GI side effects.  Since starting Prednisone years ago, she noticed an increased weight gain of approximately 80-90 lbs.  She has lost 15lbs since stopping.  Remains with LEPE but attempts to go to the gym and rides the stationary bike.  She does not require supplemental oxygen and does not have evidence of PH.  She currently takes Symbicort and prn Albuterol for her COPD.  Does not have exacerbations or require outpatient steroids/abx.     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  She feels she has negligible mild cognitive impairment and gait abnormality.  She has been seizure free for >7 years. She weaned off anti-epileptics on her own.    - Breast cancer about 3 years ago s/p lympectomy (L) with partial masectomy .       She is currently on workers comp following a fall at work which led to a back injury requiring surgery and pain management.  She previously worked as a .  She is  with her  dying from lung cancer in .  Her 2 brothers and father also  of lung cancer and she has had 2 sisters die of breast cancer.  She is UTD on mammograms and had a right axillary LN biopsy last year which was negative.  She is a never smoker but has extensive second hand exposure.  She does not drink or use illicits.     Review of Systems   Constitutional:  Negative for chills, diaphoresis, fever, malaise/fatigue and weight loss.   HENT:  Negative for congestion, ear discharge, ear pain, hearing loss, nosebleeds, sinus pain, sore throat and tinnitus.    Eyes:  Negative for blurred vision, double vision, photophobia, pain, discharge and redness.   Respiratory:  Negative for cough,  "hemoptysis, sputum production, shortness of breath, wheezing and stridor.    Cardiovascular:  Negative for chest pain, palpitations, orthopnea, claudication, leg swelling and PND.   Gastrointestinal:  Negative for abdominal pain, blood in stool, constipation, diarrhea, heartburn, melena, nausea and vomiting.   Genitourinary:  Negative for dysuria, flank pain, frequency, hematuria and urgency.   Musculoskeletal:  Negative for back pain, falls, joint pain, myalgias and neck pain.   Skin:  Negative for itching and rash.   Neurological:  Negative for dizziness, tingling, tremors, sensory change, speech change, focal weakness, seizures, loss of consciousness, weakness and headaches.   Endo/Heme/Allergies:  Negative for environmental allergies and polydipsia. Does not bruise/bleed easily.   Psychiatric/Behavioral:  Negative for depression, hallucinations, memory loss, substance abuse and suicidal ideas. The patient is not nervous/anxious and does not have insomnia.      Objective:   BP (!) 141/63 (BP Location: Right arm, Patient Position: Sitting, BP Method: Medium (Automatic))   Temp 98.6 °F (37 °C) (Oral)   Resp 16   Ht 5' 9" (1.753 m)   Wt 105.6 kg (232 lb 12.9 oz)   LMP  (LMP Unknown)   BMI 34.38 kg/m²   Physical Exam  Constitutional:       Appearance: Normal appearance. She is well-developed.   HENT:      Head: Normocephalic and atraumatic.   Eyes:      Conjunctiva/sclera: Conjunctivae normal.   Cardiovascular:      Rate and Rhythm: Normal rate and regular rhythm.      Heart sounds: Normal heart sounds.   Pulmonary:      Effort: Pulmonary effort is normal.      Breath sounds: Normal breath sounds.   Abdominal:      General: Bowel sounds are normal.      Palpations: Abdomen is soft.   Musculoskeletal:         General: Normal range of motion.      Cervical back: Normal range of motion and neck supple.   Skin:     General: Skin is warm and dry.   Neurological:      Mental Status: She is alert and oriented to " person, place, and time.   Psychiatric:         Thought Content: Thought content normal.       Labs:    Lab Visit on 09/14/2020   Component Date Value    SARS-CoV2 (COVID-19) [place]* 09/14/2020 Not Detected            5/15/2024     1:34 PM 8/31/2023     2:14 PM 6/28/2022    10:00 AM 10/14/2021    11:00 AM 9/17/2020    11:00 AM 7/30/2019    11:00 AM   Pulmonary Function Tests   FVC 1.75 liters 1.79 liters 1.87 liters 1.79 liters 1.53 liters 1.6 liters   FEV1 1.33 liters 1.39 liters 1.46 liters 1.41 liters 1.25 liters 1.24 liters   TLC (liters) 3.08 liters 3.1 liters 2.95 liters   2.54 liters   DLCO (ml/mmHg sec) 12.64 ml/mmHg sec 11.98 ml/mmHg sec 12.68 ml/mmHg sec  18.8 ml/mmHg sec 14.6 ml/mmHg sec   FVC% 56.1 56.9 59 55.8 47 45   FEV1% 54.8 56.6 59 56.2 49 44   FEF 25-75 1.29 1.29 1.31 1.41     FEF 25-75% 61.5 60.6 60 63.7     TLC% 53.4 53.7 51   43   RV 1.34 1.32    1.03   RV% 61.3 60.8    46   DLCO% 49.7 46.9 49  72 71         5/15/2024    12:08 PM 8/31/2023     1:52 PM 6/28/2022     9:36 AM 10/14/2021    10:31 AM 9/17/2020    10:20 AM 7/30/2019    10:48 AM   6MW   6MWT Status  completed without stopping completed with stops completed without stopping completed without stopping completed without stopping   Patient Reported Other (Comment) Dyspnea;Other (Comment) Lightheadedness;Leg pain;Dyspnea Dyspnea;Leg pain;Lightheadedness Dyspnea;Dizziness Lightheadedness;Dyspnea   Was O2 used?  No No No No No   6MW Distance walked (feet) 1050 feet 1300 feet 1100 feet 1300 feet 1200 feet 1277 feet   Distance walked (meters) 320.04 meters 396.24 meters 335.28 meters 396.24 meters 365.76 meters 389.23 meters   Did patient stop? No No Yes No No No   Oxygen Saturation 98 % 98 % 99 % 99 % 96 % 99 %   Supplemental Oxygen Room Air Room Air Room Air Room Air Room Air Room Air   Heart Rate 80 bpm 64 bpm 61 bpm 62 bpm 74 bpm 81 bpm   Blood Pressure 141/78 126/67 131/60 138/66 128/66 144/67   Tee Dyspnea Rating  moderate somewhat heavy  somewhat heavy moderate somewhat heavy somewhat heavy   Oxygen Saturation 96 % 97 % 100 % 96 % 98 % 90 %   Supplemental Oxygen Room Air Room Air Room Air Room Air Room Air Room Air   Heart Rate 113 bpm 74 bpm 66 bpm 85 bpm 94 bpm 79 bpm   Blood Pressure 131/83 149/66 144/65 158/72 129/59 161/64   Tee Dyspnea Rating  somewhat heavy heavy heavy heavy very heavy very heavy   Recovery Time (seconds) 90 seconds 166 seconds 120 seconds 125 seconds 74 seconds 137 seconds   Oxygen Saturation 98 % 100 % 100 % 99 % 97 % 98 %   Supplemental Oxygen Room Air Room Air Room Air Room Air Room Air Room Air   Heart Rate 91 bpm 73 bpm 63 bpm 72 bpm 69 bpm 68 bpm     TTE 04/18/2022    EXAMINATION:  CT CHEST WITHOUT CONTRAST     CLINICAL HISTORY:  Interstitial lung disease;Interstitial pulmonary disease, unspecified     TECHNIQUE:  Images of the chest extending from the supraclavicular regions to the upper abdomen were performed. Images were acquired without contrast administration. Multiplanar reconstructions were performed and pushed to PACS.     COMPARISON:  None.     FINDINGS:  Chest wall and lower neck: Asymmetric left breast soft tissue inseparable from the pectoralis muscle with a hyperdense structure likely from prior biopsy.     Lungs and pleura: Bilateral fine reticular/ground-glass opacities in the lung apices basilar prominent peripheral reticular and ground-glass confluent opacities with focal areas of cystic changes which may represent minimal honeycombing versus cystic bronchiectasis.     Mediastinum and adán: No mediastinal or hilar adenopathy.     Heart and pericardium: Heart size is normal. No pericardial effusion.     Vessels: No atheromatous disease is seen in the aorta.  The coronary arteries show no atheromatous disease.  Pulmonary trunk of normal size.     Upper abdomen: Left cortical renal cystic lesion measuring 2.7 cm.  Right cortical renal cystic lesion measuring 2.4 cm     Bones: Pectus excavatum with Jose J  index of 3.2 (mild to moderate close.  Mild degenerative changes.     Impression:     1. Bilateral bibasilar greater than bi apical confluent peripheral reticular and ground-glass opacities with minimal cystic changes focal bronchiectasis versus pulmonary confluent cysts.  Findings suspicious for interstitial lung disease NSIP pattern this can be seen as well in chronic aspiration.  2. Mild to moderate pectus excavatum.  3. Left asymmetric breast soft tissue with hyperdense structure likely from prior biopsy.  Correlate with mammography ultrasound.        Electronically signed by:Shakir Moya  Date:                                            10/03/2023  Time:                                           18:55    Assessment:-  1. ILD (interstitial lung disease)    2. MCTD (mixed connective tissue disease)    3. Obesity (BMI 30-39.9)    4. Systemic lupus erythematosus with lung involvement, unspecified SLE type    5. Lung transplant candidate    6. History of breast cancer      Plan:    Lung reserve appears to be stable compared to prior studies.  Good walk distance without desaturation.  Overall clinically doing well with good respiratory status.  She remains early for consideration for lung transplant workup.  Current barrier for lung transplant is her < 5 year disease free period from breast cancer free interval.      2.    Doing well on plaquenil with rituxan every 6 months at [facility].  Follow up with rheumatologist as previously scheduled.     3.     Breast cancer ( L Breast DCIS grade III, ER -/NC-) s/p L breast segmental mastectomy and lymphectomy.  States that she was diagnosed in 2021.  Will need reconstructive surgery.    4.   Overall stable respiratory status.  OFEV was discussed but she would like to defer at this time.      5.    Encouraged to remain active and weight loss through diet and exercise      RTC in 6 months with spirometry, DLCO, lung volumes and 6MWT.      Neo David NP  Advanced Lung  Disease

## 2024-05-15 NOTE — LETTER
May 15, 2024        Nishith M. Mewada  [address]  [facility] [address]  Phone: [phone]  Fax: [phone]             [facility] Transplant 1st Fl  [address]  [facility] [address]  Phone: [phone]   Patient: Katy Vega   MR Number: [id number]   YOB: 1960   Date of Visit: 5/15/2024       Dear Dr. Nishith M. Mewada    Thank you for referring Katy Vega to me for evaluation. Attached you will find relevant portions of my assessment and plan of care.    If you have questions, please do not hesitate to call me. I look forward to following Katy Vega along with you.    Sincerely,    Neo David NP    Enclosure    If you would like to receive this communication electronically, please contact [email or web address] or [phone] to request [employer] access.    [employer] is a tool which provides read-only access to select patient information with whom you have a relationship. Its easy to use and provides real time access to review your patients record including encounter summaries, notes, results, and demographic information.    If you feel you have received this communication in error or would no longer like to receive these types of communications, please e-mail [email or web address]

## 2024-05-16 NOTE — PROCEDURES
Katy Vega is a 64 y.o.  female patient, who presents for a 6 minute walk test ordered by DO Jen.  The diagnosis is Interstitial Lung Disease; Connective Tissue Disease.  The patient's BMI is 34.4 kg/m2.  Predicted distance (lower limit of normal) is 296.05 meters.      Test Results:    The test was completed.  The total time walked was 360 seconds.  During walking, the patient reported:  Other (Comment) (exhaustion).  The patient used no assistive devices during testing.     05/15/2024---------Distance: 381.3 meters (1250 feet)     Lap Walk Time O2 Sat % Supplemental Oxygen Heart Rate Blood Pressure Tee Scale   Pre-exercise  (Resting) 0 0 98 % Room Air 80 bpm 141/78 mmHg 3   During Exercise 1 49 sec 95 % Room Air 109 bpm     During Exercise 2 104 sec 96 % Room Air 108 bpm     During Exercise 3 157 sec 98 % Room Air 107 bpm     During Exercise 4 215 sec 96 % Room Air 113 bpm     During Exercise 5 284 sec 97 % Room Air 112 bpm     During Exercise 6 345 sec 96 % Room Air 107 bpm     End of Exercise  360 sec 96 % Room Air 113 bpm 131/83 mmHg 4   Post-exercise  (Recovery)   98 % Room Air  91 bpm       Recovery Time: 90 seconds    Performing nurse/tech: Anu GUEVARA      PREVIOUS STUDY:   08/31/2023---------Distance: 396.24 meters (1300 feet)       O2 Sat % Supplemental Oxygen Heart Rate Blood Pressure Tee Scale   Pre-exercise  (Resting) 98 % Room Air 64 bpm 126/67 mmHg 4   During Exercise 97 % Room Air 74 bpm 149/66 mmHg 5-6   Post-exercise  (Recovery) 100 % Room Air  73 bpm 140/61 mmHg         CLINICAL INTERPRETATION:  Six minute walk distance is 320.04 meters (1050 feet) with somewhat heavy dyspnea.  During exercise, there was no significant desaturation while breathing room air.  Blood pressure remained stable and Heart rate increased significantly with walking.  The patient reported non-pulmonary symptoms during exercise.  Since the previous study in August 2023, exercise capacity is  unchanged.  Based upon age and body mass index, exercise capacity is normal.

## 2024-05-17 DIAGNOSIS — J84.9 ILD (INTERSTITIAL LUNG DISEASE): Primary | ICD-10-CM

## 2024-06-14 ENCOUNTER — TELEPHONE (OUTPATIENT)
Dept: OBSTETRICS AND GYNECOLOGY | Facility: CLINIC | Age: 64
End: 2024-06-14
Payer: MEDICARE

## 2024-06-14 NOTE — TELEPHONE ENCOUNTER
----- Message from Kat Corrales sent at 6/14/2024 11:55 AM CDT -----  Contact: [phone]  Pt is returning a call in regards to rescheduling her appt that was canceled for 07/15. The next available appt is 08/19. Pt would like an appt sometime in July because she will be having surgery. Please call to schedule a sooner appt.                 Thank you

## 2024-06-14 NOTE — TELEPHONE ENCOUNTER
Called pt and scheduled appt for 7/22 at [facility]. Pt was satisfied w the appt and verbally expressed understanding.

## 2024-07-22 ENCOUNTER — TELEPHONE (OUTPATIENT)
Dept: OBSTETRICS AND GYNECOLOGY | Facility: CLINIC | Age: 64
End: 2024-07-22
Payer: MEDICARE

## 2024-07-22 ENCOUNTER — OFFICE VISIT (OUTPATIENT)
Dept: OBSTETRICS AND GYNECOLOGY | Facility: CLINIC | Age: 64
End: 2024-07-22
Payer: MEDICARE

## 2024-07-22 VITALS
SYSTOLIC BLOOD PRESSURE: 134 MMHG | WEIGHT: 230.19 LBS | BODY MASS INDEX: 33.99 KG/M2 | DIASTOLIC BLOOD PRESSURE: 86 MMHG

## 2024-07-22 DIAGNOSIS — Z85.3 PERSONAL HISTORY OF BREAST CANCER: ICD-10-CM

## 2024-07-22 DIAGNOSIS — N87.1 DYSPLASIA OF CERVIX, HIGH GRADE CIN 2: ICD-10-CM

## 2024-07-22 DIAGNOSIS — Z01.419 ENCOUNTER FOR WELL WOMAN EXAM WITH ROUTINE GYNECOLOGICAL EXAM: Primary | ICD-10-CM

## 2024-07-22 DIAGNOSIS — Z12.11 COLON CANCER SCREENING: ICD-10-CM

## 2024-07-22 DIAGNOSIS — Z01.419 WELL WOMAN EXAM WITH ROUTINE GYNECOLOGICAL EXAM: ICD-10-CM

## 2024-07-22 DIAGNOSIS — Z12.31 ENCOUNTER FOR SCREENING MAMMOGRAM FOR MALIGNANT NEOPLASM OF BREAST: ICD-10-CM

## 2024-07-22 DIAGNOSIS — Z80.9 FAMILY HISTORY OF CANCER: ICD-10-CM

## 2024-07-22 PROCEDURE — 88305 TISSUE EXAM BY PATHOLOGIST: CPT | Performed by: PATHOLOGY

## 2024-07-22 PROCEDURE — 88175 CYTOPATH C/V AUTO FLUID REDO: CPT | Performed by: OBSTETRICS & GYNECOLOGY

## 2024-07-22 PROCEDURE — 87624 HPV HI-RISK TYP POOLED RSLT: CPT | Performed by: OBSTETRICS & GYNECOLOGY

## 2024-07-22 PROCEDURE — 99999 PR PBB SHADOW E&M-EST. PATIENT-LVL III: CPT | Mod: PBBFAC,,, | Performed by: OBSTETRICS & GYNECOLOGY

## 2024-07-22 NOTE — PROGRESS NOTES
Chief Complaint: Annual exam    Chief Complaint   Patient presents with    Well Woman       HPI:   64 y.o.  here today for annual exam. Denies any changes to health history since last visit. PMH includes HTN, SLE, COPD, arthritis, stroke and breast cancer. She is s/p supracervical hysterectomy and s/p LEEP 2023. She denies vaginal bleeding or bothersome vasomotor or GSM. Notices breast soreness in the left breast and armpit, s/p mastectomy and reconstruction for breast cancer on that side, no symptoms on the right side, no other breast concerns. Two sisters with breast cancer diagnosed age 60s, niece with breast cancer diagnosed at age 35, otherwise negative FH of breast, uterine, ovarian, or colon cancer. Has not had screening done for hereditary cancer syndromes but is interested. Patient is doing well today with no complaints or concerns. She is occasionally sexually active, every 6 months. She has been vaccinated against COVID-19. She received PNA vaccine, has not received Shingles vaccine. Declines vaccine.     Labs / Significant Studies    Pap (): ASCUS/HPV HR+ other  Colpo (2023): 12:00 BIANKA 1-2, ECC BIANKA 1-2  LEEP (2023): BIANKA 1, negative margins, ECC insufficient  Pap (2024): LSIL/HPV HR+ other   MMG (3/2023): BIRADS-1, needs  Colonoscopy: Needs  DEXA: Age 66    Past Medical History:   Diagnosis Date    Aneurysm     Arthritis     Back pain at L4-L5 level     accident    Breast cancer     Left breast    Chronic back pain     COPD (chronic obstructive pulmonary disease)     Hypertension     Lupus     Raynaud disease     Seizures     Stroke 2012     Past Surgical History:   Procedure Laterality Date    BREAST SURGERY      BREAST SURGERY Left 2020    stage 1 breast cancer    CERVICAL BIOPSY  W/ LOOP ELECTRODE EXCISION  2023    LEEP    NERVE SURGERY      PARTIAL HYSTERECTOMY         Current Outpatient Medications:     amlodipine (NORVASC) 10 MG tablet, Take 10 mg by mouth once  daily., Disp: , Rfl:     budesonide-formoterol 160-4.5 mcg (SYMBICORT) 160-4.5 mcg/actuation HFAA, Inhale 2 puffs into the lungs every 12 (twelve) hours., Disp: , Rfl:     duloxetine (CYMBALTA) 60 MG capsule, Take 60 mg by mouth once daily. , Disp: , Rfl:     gabapentin (NEURONTIN) 300 MG capsule, Take 600 mg by mouth 2 (two) times daily., Disp: , Rfl:     hydrocodone-acetaminophen 10-325mg (NORCO)  mg Tab, Take 1 tablet by mouth every 8 (eight) hours as needed for Pain., Disp: , Rfl:     hydroxychloroquine (PLAQUENIL) 200 mg tablet, Take 200 mg by mouth 2 (two) times daily., Disp: , Rfl:     LIDOcaine (LIDODERM) 5 %, APPLY 1 PATCH EXTERNALLY TO SKIN ONCE DAILY AS NEEDED FOR PAIN. REMOVE AFTER 12 HOURS, Disp: , Rfl:     silver sulfADIAZINE 1% (SILVADENE) 1 % cream, Apply 1 application topically 3 (three) times a week., Disp: , Rfl:     acyclovir (ZOVIRAX) 400 MG tablet, TAKE 1 TABLET BY MOUTH THREE TIMES DAILY FOR 5 DAYS (Patient not taking: Reported on 7/22/2024), Disp: , Rfl:     ALBUTEROL SULFATE (PROVENTIL HFA INHL), Inhale 1-2 puffs into the lungs as needed.  (Patient not taking: Reported on 7/22/2024), Disp: , Rfl:     amLODIPine (NORVASC) 5 MG tablet, Take 1 tablet by mouth once daily. (Patient not taking: Reported on 7/22/2024), Disp: , Rfl:     cephALEXin (KEFLEX) 500 MG capsule, Take 1 capsule by mouth 2 (two) times daily. (Patient not taking: Reported on 7/22/2024), Disp: , Rfl:     dexbrompheniramine-phenylep-DM 2-7.5-15 mg/5 mL Liqd, TAKE 5 ML BY MOUTH EVERY 8 HOURS FOR 6 DAYS AS NEEDED FOR COUGH (Patient not taking: Reported on 7/22/2024), Disp: , Rfl:     diazePAM (VALIUM) 5 MG tablet, TAKE 1 TABLET BY MOUTH 45 MINUTES PRIOR TO PROCEDURE (Patient not taking: Reported on 5/15/2024), Disp: , Rfl:     diclofenac sodium (VOLTAREN) 1 % Gel, Apply 2 g topically as needed.  (Patient not taking: Reported on 7/22/2024), Disp: , Rfl:     levETIRAcetam (KEPPRA) 250 MG Tab, TK 1 T PO BID (Patient not  taking: Reported on 2024), Disp: , Rfl:     levETIRAcetam (KEPPRA) 500 MG Tab, , Disp: , Rfl:     lisinopriL (PRINIVIL,ZESTRIL) 20 MG tablet, Take 1 tablet by mouth once daily. (Patient not taking: Reported on 2024), Disp: , Rfl:     meloxicam (MOBIC) 7.5 MG tablet, Take 1 tablet by mouth once daily. (Patient not taking: Reported on 2024), Disp: , Rfl:     metroNIDAZOLE (FLAGYL) 500 MG tablet, , Disp: , Rfl:     mometasone (NASONEX) 50 mcg/actuation nasal spray, 2 sprays by Nasal route daily as needed.  (Patient not taking: Reported on 2024), Disp: , Rfl:     naproxen (NAPROSYN) 375 MG tablet, , Disp: , Rfl:     ondansetron (ZOFRAN-ODT) 8 MG TbDL, DISSOLVE 1 TABLET ON THE TONGUE EVERY 8 HOURS AS NEEDED FOR NAUSEA (Patient not taking: Reported on 2024), Disp: , Rfl:     oxyCODONE-acetaminophen (PERCOCET)  mg per tablet, Take 1 tablet by mouth 4 (four) times daily as needed. (Patient not taking: Reported on 2024), Disp: , Rfl:     polyethylene glycol (GOLYTELY) 236-22.74-6.74 -5.86 gram suspension, , Disp: , Rfl:     semaglutide (OZEMPIC) 0.25 mg or 0.5 mg(2 mg/1.5 mL) pen injector, INJECT 0.5MG INTO THE SKIN EVERY 7 DAYS FOR 4 DOSES (Patient not taking: Reported on 5/15/2024), Disp: , Rfl:     semaglutide (OZEMPIC) 1 mg/dose (2 mg/1.5 mL) PnIj, ADMINISTER 1 MG UNDER THE SKIN EVERY 7 DAYS (Patient not taking: Reported on 5/15/2024), Disp: , Rfl:     triamcinolone acetonide 0.1% (KENALOG) 0.1 % cream, Apply layer to site before silvadene twice daily for radiation dermatitis/burn, Disp: , Rfl:   Review of patient's allergies indicates:  No Known Allergies  OB History    Para Term  AB Living   2 1 1   1 1   SAB IAB Ectopic Multiple Live Births   1       1      # Outcome Date GA Lbr Estiven/2nd Weight Sex Type Anes PTL Lv   2 2000           1 Term 83    M Vag-Spont   MELLY     Social History     Tobacco Use    Smoking status: Never     Passive exposure: Past     Smokeless tobacco: Never   Substance Use Topics    Alcohol use: Yes     Comment: very occasional - holiday    Drug use: Never     Family History   Problem Relation Name Age of Onset    Lung cancer Brother      Lung cancer Brother      Lupus Sister      Breast cancer Sister      Breast cancer Sister      Colon cancer Neg Hx      Ovarian cancer Neg Hx         Review of Systems   Negative except as in HPI     Physical Exam   Vitals:    07/22/24 1338   BP: 134/86     Body mass index is 33.99 kg/m².    Physical Exam  Constitutional:       General: She is not in acute distress.     Appearance: Normal appearance.     Genitourinary:    Vulva and urethral meatus normal.   The external female genitalia was normal.   No external genitalia lesions identified,Genitalia hair distrobution normal .   No vaginal discharge or bleeding in the vagina.    No vaginal prolapse present.     Mild vaginal atrophy present.  Right adnexum displays not palpable. Left adnexum displays not palpable. Cervix is normal. Cervix exhibits no motion tenderness and no lesion. Uterus is absent. Breasts:     Right: No inverted nipple, mass, nipple discharge or skin change.      Left: No inverted nipple, mass, nipple discharge or skin change.   HENT:      Head: Normocephalic and atraumatic.   Eyes:      Extraocular Movements: Extraocular movements intact.      Pupils: Pupils are equal, round, and reactive to light.   Neck:      Thyroid: No thyroid mass, thyromegaly or thyroid tenderness.   Pulmonary:      Effort: Pulmonary effort is normal.   Chest:       Abdominal:      General: Abdomen is flat. There is no distension.      Palpations: Abdomen is soft.      Tenderness: There is no abdominal tenderness. There is no guarding or rebound.   Musculoskeletal:         General: Normal range of motion.      Cervical back: Normal range of motion.   Lymphadenopathy:      Cervical: No cervical adenopathy.      Upper Body:      Right upper body: No supraclavicular or  axillary adenopathy.      Left upper body: No supraclavicular or axillary adenopathy.   Neurological:      General: No focal deficit present.      Mental Status: She is alert and oriented to person, place, and time.   Skin:     General: Skin is warm and dry.   Psychiatric:         Mood and Affect: Mood normal.         Behavior: Behavior normal.   Vitals reviewed.          ASSESSMENT:   Annual Well Women Exam  Patient Active Problem List   Diagnosis    ILD (interstitial lung disease)    Lung transplant candidate    Class 1 obesity due to excess calories with serious comorbidity and body mass index (BMI) of 32.0 to 32.9 in adult    Hypertension    Systemic lupus erythematosus with lung involvement    Personal history of breast cancer    ASCUS with positive high risk HPV cervical    Status post LEEP (loop electrosurgical excision procedure) of cervix    Dysplasia of cervix, high grade BIANKA 2    Encounter for well woman exam with routine gynecological exam    Family history of cancer    Well woman exam with routine gynecological exam     Health Maintenance Due   Topic Date Due    TETANUS VACCINE  Never done    Colorectal Cancer Screening  Never done    Shingles Vaccine (1 of 2) Never done    RSV Vaccine (Age 60+ and Pregnant patients) (1 - 1-dose 60+ series) Never done    COVID-19 Vaccine (4 - 2023-24 season) 09/01/2023    Mammogram  03/17/2024     Health Maintenance Topics with due status: Not Due       Topic Last Completion Date    Cervical Cancer Screening 01/22/2024    Hemoglobin A1c (Diabetic Prevention Screening) 04/02/2024    Lipid Panel 04/02/2024    Influenza Vaccine Not Due       PLAN:  Problem List Items Addressed This Visit          Renal/    Dysplasia of cervix, high grade BIANKA 2    Relevant Orders    HPV High Risk Genotypes, PCR    Liquid-Based Pap Smear, Screening    Specimen to Pathology, Ob/Gyn    Encounter for well woman exam with routine gynecological exam - Primary    Current Assessment & Plan      Normal breast and pelvic exams except as noted. Pap/cotesting collected today. Continue breast self awareness. MMG, colonoscopy, DEXA scan up to date. Recommend 30 minutes of exercise 5 times weekly. Counseled patient on promoting a healthy lifestyle including regular weightbearing physical activity (30 minutes on most days of the week), adequate nutrition (protein, calcium, and vitamin D), continued smoking cessation, no or moderate alcohol intake, and fall prevention. RTC 1 year for annual exam, sooner PRN. Follow up with PCP as scheduled for routine health maintenance.            Well woman exam with routine gynecological exam    Current Assessment & Plan     Normal breast and pelvic exams except as noted. Pap/cotesting and ECC collected today for history of BIANKA 2 s/p LEEP (path BIANKA 1 with negative margins, ECC insufficient). Continue breast self awareness. MMG ordered, will schedule, colonoscopy ordered. Recommend 30 minutes of exercise 5 times weekly. Counseled patient on promoting a healthy lifestyle including regular weightbearing physical activity (30 minutes on most days of the week), adequate nutrition (protein, calcium, and vitamin D), continued smoking cessation, no or moderate alcohol intake, and fall prevention. Follow up with PCP as scheduled for routine health maintenance.               Oncology    Personal history of breast cancer    Overview     Left breast         Relevant Orders    Ambulatory referral/consult to Genetics    Family history of cancer    Relevant Orders    Ambulatory referral/consult to Genetics     Other Visit Diagnoses       Encounter for screening mammogram for malignant neoplasm of breast        Relevant Orders    Mammo Digital Screening Bilat w/ Hong    Colon cancer screening        Relevant Orders    Ambulatory referral/consult to Endo Procedure             Follow up for Will contact patient with results.       Lilian Ellington MD  Department of Obstetrics &  Gynecology  [facility]

## 2024-07-23 PROBLEM — Z01.419 WELL WOMAN EXAM WITH ROUTINE GYNECOLOGICAL EXAM: Status: ACTIVE | Noted: 2024-07-23

## 2024-07-24 LAB
CLINICAL INFO: NORMAL
DATE OF PREVIOUS PAP: NORMAL
DATE PREVIOUS BX: YES
LMP START DATE: NORMAL
SPECIMEN SOURCE CVX/VAG CYTO: NORMAL

## 2024-07-24 NOTE — ASSESSMENT & PLAN NOTE
Normal breast and pelvic exams except as noted. Pap/cotesting collected today. Continue breast self awareness. MMG, colonoscopy, DEXA scan up to date. Recommend 30 minutes of exercise 5 times weekly. Counseled patient on promoting a healthy lifestyle including regular weightbearing physical activity (30 minutes on most days of the week), adequate nutrition (protein, calcium, and vitamin D), continued smoking cessation, no or moderate alcohol intake, and fall prevention. RTC 1 year for annual exam, sooner PRN. Follow up with PCP as scheduled for routine health maintenance.

## 2024-07-24 NOTE — ASSESSMENT & PLAN NOTE
Normal breast and pelvic exams except as noted. Pap/cotesting and ECC collected today for history of BIANKA 2 s/p LEEP (path BIANKA 1 with negative margins, ECC insufficient). Continue breast self awareness. MMG ordered, will schedule, colonoscopy ordered. Recommend 30 minutes of exercise 5 times weekly. Counseled patient on promoting a healthy lifestyle including regular weightbearing physical activity (30 minutes on most days of the week), adequate nutrition (protein, calcium, and vitamin D), continued smoking cessation, no or moderate alcohol intake, and fall prevention. Follow up with PCP as scheduled for routine health maintenance.

## 2024-07-25 LAB
FINAL PATHOLOGIC DIAGNOSIS: NORMAL
GROSS: NORMAL
Lab: NORMAL

## 2024-08-06 ENCOUNTER — HOSPITAL ENCOUNTER (OUTPATIENT)
Dept: RADIOLOGY | Facility: OTHER | Age: 64
Discharge: HOME | End: 2024-08-06
Attending: OBSTETRICS & GYNECOLOGY
Payer: MEDICARE

## 2024-08-06 DIAGNOSIS — Z12.31 ENCOUNTER FOR SCREENING MAMMOGRAM FOR MALIGNANT NEOPLASM OF BREAST: ICD-10-CM

## 2024-08-06 DIAGNOSIS — B97.7 HIGH RISK HPV INFECTION: Primary | ICD-10-CM

## 2024-08-06 PROCEDURE — 77067 SCR MAMMO BI INCL CAD: CPT | Mod: TC,TXP

## 2024-08-06 PROCEDURE — 77063 BREAST TOMOSYNTHESIS BI: CPT | Mod: TC,TXP

## 2024-08-09 ENCOUNTER — CLINICAL SUPPORT (OUTPATIENT)
Dept: OBSTETRICS AND GYNECOLOGY | Facility: CLINIC | Age: 64
End: 2024-08-09
Payer: MEDICARE

## 2024-08-09 DIAGNOSIS — B97.7 HIGH RISK HPV INFECTION: ICD-10-CM

## 2024-08-14 ENCOUNTER — TELEPHONE (OUTPATIENT)
Dept: OBSTETRICS AND GYNECOLOGY | Facility: CLINIC | Age: 64
End: 2024-08-14
Payer: MEDICARE
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
## 2024-08-14 NOTE — TELEPHONE ENCOUNTER
----- Message from Lilian Ellington MD sent at 8/14/2024  2:47 PM CDT -----  Please call patient and let her know her mammogram was normal! We recommend yearly well woman exams.

## 2024-10-04 ENCOUNTER — TELEPHONE (OUTPATIENT)
Dept: OBSTETRICS AND GYNECOLOGY | Facility: CLINIC | Age: 64
End: 2024-10-04
Payer: MEDICARE

## 2024-10-04 NOTE — TELEPHONE ENCOUNTER
Pt would like to reschedule appt today bc of the rain. Pt is scheduled for 10/9 [facility]. Pt satisfied w appt.

## 2024-10-08 ENCOUNTER — TELEPHONE (OUTPATIENT)
Dept: TRANSPLANT | Facility: CLINIC | Age: 64
End: 2024-10-08
Payer: MEDICARE

## 2024-10-09 ENCOUNTER — CLINICAL SUPPORT (OUTPATIENT)
Dept: OBSTETRICS AND GYNECOLOGY | Facility: CLINIC | Age: 64
End: 2024-10-09
Payer: MEDICARE

## 2024-10-09 ENCOUNTER — TELEPHONE (OUTPATIENT)
Dept: OBSTETRICS AND GYNECOLOGY | Facility: CLINIC | Age: 64
End: 2024-10-09
Payer: MEDICARE

## 2024-10-09 DIAGNOSIS — Z23 NEED FOR HPV VACCINE: Primary | ICD-10-CM

## 2024-10-09 DIAGNOSIS — B97.7 HIGH RISK HPV INFECTION: Primary | ICD-10-CM

## 2024-10-09 DIAGNOSIS — Z23 NEED FOR HPV VACCINATION: ICD-10-CM

## 2024-10-09 PROCEDURE — 99999PBSHW PR PBB SHADOW TECHNICAL ONLY FILED TO HB: Mod: PBBFAC,,,

## 2024-10-09 PROCEDURE — 99999 PR PBB SHADOW E&M-EST. PATIENT-LVL I: CPT | Mod: PBBFAC,,,

## 2024-10-09 PROCEDURE — 90471 IMMUNIZATION ADMIN: CPT | Mod: PBBFAC

## 2024-10-09 PROCEDURE — 99211 OFF/OP EST MAY X REQ PHY/QHP: CPT | Mod: PBBFAC

## 2024-10-09 PROCEDURE — 90651 9VHPV VACCINE 2/3 DOSE IM: CPT | Mod: PBBFAC

## 2024-10-09 RX ADMIN — HUMAN PAPILLOMAVIRUS 9-VALENT VACCINE, RECOMBINANT 0.5 ML: 30; 40; 60; 40; 20; 20; 20; 20; 20 INJECTION, SUSPENSION INTRAMUSCULAR at 10:10

## 2024-10-09 NOTE — PROGRESS NOTES
Here for Gardasil # 2 of 3 injection, without complaint at this time. Reports no pain prior to or post injection. Advised to wait in lobby 15 minutes post injection and report any adverse reactions.    Return to clinic in 4 months for next injection.     Site: rb (per patient request)

## 2024-11-08 ENCOUNTER — TELEPHONE (OUTPATIENT)
Dept: TRANSPLANT | Facility: CLINIC | Age: 64
End: 2024-11-08
Payer: MEDICARE

## 2024-11-11 ENCOUNTER — HOSPITAL ENCOUNTER (OUTPATIENT)
Dept: PULMONOLOGY | Facility: CLINIC | Age: 64
Discharge: HOME | End: 2024-11-11
Payer: MEDICARE

## 2024-11-11 ENCOUNTER — OFFICE VISIT (OUTPATIENT)
Dept: TRANSPLANT | Facility: CLINIC | Age: 64
End: 2024-11-11
Payer: MEDICARE

## 2024-11-11 VITALS
HEIGHT: 69 IN | WEIGHT: 236 LBS | DIASTOLIC BLOOD PRESSURE: 86 MMHG | WEIGHT: 236 LBS | HEIGHT: 69 IN | BODY MASS INDEX: 34.96 KG/M2 | SYSTOLIC BLOOD PRESSURE: 171 MMHG | HEART RATE: 64 BPM | TEMPERATURE: 98 F | BODY MASS INDEX: 34.96 KG/M2 | OXYGEN SATURATION: 98 % | RESPIRATION RATE: 16 BRPM

## 2024-11-11 DIAGNOSIS — J84.9 ILD (INTERSTITIAL LUNG DISEASE): Primary | ICD-10-CM

## 2024-11-11 DIAGNOSIS — J84.9 ILD (INTERSTITIAL LUNG DISEASE): ICD-10-CM

## 2024-11-11 DIAGNOSIS — Z85.3 HISTORY OF BREAST CANCER: ICD-10-CM

## 2024-11-11 DIAGNOSIS — M32.13 SYSTEMIC LUPUS ERYTHEMATOSUS WITH LUNG INVOLVEMENT, UNSPECIFIED SLE TYPE: ICD-10-CM

## 2024-11-11 LAB
ERVN2 LLN: -16449.19
ERVN2 PRE REF: 4.3 %
ERVN2 PRE: 0.03 L (ref -16449.19–16450.81)
ERVN2 REF: 0.81
ERVN2ULN: ABNORMAL
FEF 25 75 LLN: 1.57
FEF 25 75 PRE REF: 45.4 %
FEF 25 75 REF: 2.97
FEV05 LLN: 1.19
FEV05 REF: 2.04
FEV1 FVC LLN: 67
FEV1 FVC PRE REF: 102.3 %
FEV1 FVC REF: 79
FEV1 LLN: 1.74
FEV1 PRE REF: 55.1 %
FEV1 REF: 2.42
FEV1FVCZSCORE: 0.27
FEV1ZSCORE: -2.58
FRCN2 LLN: 2.17
FRCN2 PRE REF: 32.1 %
FRCN2 REF: 2.99
FRCN2ULN: 3.81
FVC LLN: 2.26
FVC PRE REF: 53.5 %
FVC REF: 3.1
FVCZSCORE: -2.87
ICN2REF: 2.57
LLN IC N2: -16447.43
PEF LLN: 3.91
PEF PRE REF: 44.9 %
PEF REF: 6.24
PHYSICIAN COMMENT: ABNORMAL
PRE FEF 25 75: 1.35 L/S (ref 1.57–4.37)
PRE FET 100: 5.95 SEC
PRE FEV05 REF: 49.7 %
PRE FEV1 FVC: 80.49 % (ref 66.71–89.02)
PRE FEV1: 1.33 L (ref 1.74–3.07)
PRE FEV5: 1.02 L (ref 1.19–2.9)
PRE FRC N2: 0.96 L (ref 2.17–3.81)
PRE FVC: 1.66 L (ref 2.26–3.99)
PRE IC N2: 1.62 L (ref -16447.43–16452.57)
PRE PEF: 2.8 L/S (ref 3.91–8.57)
PRE REF IC N2: 63.2 %
RVN2 LLN: 1.6
RVN2 PRE REF: 42.5 %
RVN2 PRE: 0.93 L (ref 1.6–2.76)
RVN2 REF: 2.18
RVN2TLCN2 LLN: 30.79
RVN2TLCN2 PRE REF: 88.7 %
RVN2TLCN2 PRE: 35.83 % (ref 30.79–49.97)
RVN2TLCN2 REF: 40.38
RVN2TLCN2ULN: 49.97
RVN2ULN: 2.76
TLCN2 LLN: 4.79
TLCN2 PRE REF: 44.7 %
TLCN2 PRE: 2.58 L (ref 4.79–6.76)
TLCN2 REF: 5.78
TLCN2ULN: 6.76
TLCN2ZSCORE: -5.32
ULN IC N2: ABNORMAL
VCMAXN2 LLN: 2.26
VCMAXN2 PRE REF: 53.5 %
VCMAXN2 PRE: 1.66 L (ref 2.26–3.99)
VCMAXN2 REF: 3.1
VCMAXN2ULN: 3.99

## 2024-11-11 PROCEDURE — 99999 PR PBB SHADOW E&M-EST. PATIENT-LVL III: CPT | Mod: PBBFAC,TXP,, | Performed by: PHYSICIAN ASSISTANT

## 2024-11-11 PROCEDURE — 94618 PULMONARY STRESS TESTING: CPT | Mod: PBBFAC,NTX | Performed by: INTERNAL MEDICINE

## 2024-11-11 PROCEDURE — 94729 DIFFUSING CAPACITY: CPT | Mod: 53,PBBFAC,NTX | Performed by: INTERNAL MEDICINE

## 2024-11-11 PROCEDURE — 94727 GAS DIL/WSHOT DETER LNG VOL: CPT | Mod: PBBFAC,NTX | Performed by: INTERNAL MEDICINE

## 2024-11-11 PROCEDURE — 99213 OFFICE O/P EST LOW 20 MIN: CPT | Mod: PBBFAC,TXP,25 | Performed by: PHYSICIAN ASSISTANT

## 2024-11-11 PROCEDURE — 94010 BREATHING CAPACITY TEST: CPT | Mod: PBBFAC,NTX | Performed by: INTERNAL MEDICINE

## 2024-11-11 NOTE — LETTER
November 11, 2024        Nishith M. Mewada  [address]  [facility] [address]  Phone: [phone]  Fax: [phone]             [facility] Trans[facility] 1st Fl  [address]  [facility] [address]  Phone: [phone]   Patient: Katy Vega   MR Number: [id number]   YOB: 1960   Date of Visit: 11/11/2024       Dear Dr. Nishith M. Mewada    Thank you for referring Katy Vega to me for evaluation. Attached you will find relevant portions of my assessment and plan of care.    If you have questions, please do not hesitate to call me. I look forward to following Katy Vega along with you.    Sincerely,    Chelsi Sullivan PA-C    Enclosure    If you would like to receive this communication electronically, please contact [email or web address] or [phone] to request [employer] Link access.    [employer] Link is a tool which provides read-only access to select patient information with whom you have a relationship. Its easy to use and provides real time access to review your patients record including encounter summaries, notes, results, and demographic information.    If you feel you have received this communication in error or would no longer like to receive these types of communications, please e-mail [email or web address]

## 2024-11-11 NOTE — PROGRESS NOTES
ADVANCED LUNG DISEASE FOLLOW-UP    Referring Physician: Nishith M. 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 She is on no assisted ventilation.  Her New York Heart Association Class is II and a Karnofsky score of 80% - Normal activity with effort: some symptoms of disease. She is not diabetic.     Patient presents today for routine follow up for MCTD-ILD.  Overall doing very well since her last visit. She is awaiting scheduling of her reconstruction surgery. No recent exacerbations/hospitalizations. Reports some dyspnea since her last appointment; however, she admits to weight gain and inactivity. On rituxan every six months and tolerating well.     Brief history referring to [facility] from July 2019  Patient reports LEPE for years starting in [id number].  At that time, she also noticed increasing fatigue with daily activities.  She was first evaluated at [facility] in 2006 when she evacuated after [place].  She states at that time she was diagnosed with COPD and ILD.  Further work-up showed positive autoimmune serologies and she was diagnosed with Lupus.  She was started on Plaquenil and Prednisone.  She then had intermittent follow-up until she moved back to [place] in 2012.  At that time, she established with Rheumatology at  and was again started on Plaquenil and Prednisone which she remained on until a few months ago.  She then began following with [facility] at [facility].  CT chest showed evidence of GGOs and fibrosis which have been stable on imaging.  They noticed a decrease in her FVC and referred her to [facility] who have since  stopped the Prednisone, continued her on Plaquenil, and added MMF.  She has been unable to tolerate more than 1500mg per day due to GI side effects.  Since starting Prednisone years ago, she noticed an increased weight gain of approximately 80-90 lbs.  She has lost 15lbs since stopping.  Remains with LEPE but attempts to go to the gym and rides the stationary bike.  She does not require supplemental oxygen and does not have evidence of PH.  She currently takes Symbicort and prn Albuterol for her COPD.  Does not have exacerbations or require outpatient steroids/abx.     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  She feels she has negligible mild cognitive impairment and gait abnormality.  She has been seizure free for >7 years. She weaned off anti-epileptics on her own.    - Breast cancer about 3 years ago s/p lympectomy (L) with partial masectomy .       She is currently on workers comp following a fall at work which led to a back injury requiring surgery and pain management.  She previously worked as a .  She is  with her  dying from lung cancer in .  Her 2 brothers and father also  of lung cancer and she has had 2 sisters die of breast cancer.  She is UTD on mammograms and had a right axillary LN biopsy last year which was negative.  She is a never smoker but has extensive second hand exposure.  She does not drink or use illicits.     Review of Systems   Constitutional:  Negative for chills, diaphoresis, fever, malaise/fatigue and weight loss.   HENT:  Negative for congestion, ear discharge, ear pain, hearing loss, nosebleeds, sinus pain, sore throat and tinnitus.    Eyes:  Negative for blurred vision, double vision, photophobia, pain, discharge and redness.   Respiratory:  Negative for cough, hemoptysis, sputum production, shortness of breath, wheezing and stridor.    Cardiovascular:  Negative for  "chest pain, palpitations, orthopnea, claudication, leg swelling and PND.   Gastrointestinal:  Negative for abdominal pain, blood in stool, constipation, diarrhea, heartburn, melena, nausea and vomiting.   Genitourinary:  Negative for dysuria, flank pain, frequency, hematuria and urgency.   Musculoskeletal:  Negative for back pain, falls, joint pain, myalgias and neck pain.   Skin:  Negative for itching and rash.   Neurological:  Negative for dizziness, tingling, tremors, sensory change, speech change, focal weakness, seizures, loss of consciousness, weakness and headaches.   Endo/Heme/Allergies:  Negative for environmental allergies and polydipsia. Does not bruise/bleed easily.   Psychiatric/Behavioral:  Negative for depression, hallucinations, memory loss, substance abuse and suicidal ideas. The patient is not nervous/anxious and does not have insomnia.      Objective:   BP (!) 171/86 (BP Location: Right arm, Patient Position: Sitting)   Pulse 64   Temp 98.2 °F (36.8 °C) (Oral)   Resp 16   Ht 5' 9" (1.753 m)   Wt 107 kg (236 lb)   LMP  (LMP Unknown)   SpO2 98%   BMI 34.85 kg/m²   Physical Exam  Constitutional:       Appearance: Normal appearance. She is well-developed.   HENT:      Head: Normocephalic and atraumatic.   Eyes:      Conjunctiva/sclera: Conjunctivae normal.   Cardiovascular:      Rate and Rhythm: Normal rate and regular rhythm.      Heart sounds: Normal heart sounds.   Pulmonary:      Effort: Pulmonary effort is normal.      Breath sounds: Normal breath sounds.   Abdominal:      General: Bowel sounds are normal.      Palpations: Abdomen is soft.   Musculoskeletal:         General: Normal range of motion.      Cervical back: Normal range of motion and neck supple.   Skin:     General: Skin is warm and dry.   Neurological:      Mental Status: She is alert and oriented to person, place, and time.   Psychiatric:         Thought Content: Thought content normal.       Labs:    Lab Visit on 09/14/2020 "   Component Date Value    SARS-CoV2 (COVID-19) [place]* 09/14/2020 Not Detected            11/11/2024     1:37 PM 5/15/2024     1:34 PM 8/31/2023     2:14 PM 6/28/2022    10:00 AM 10/14/2021    11:00 AM 9/17/2020    11:00 AM 7/30/2019    11:00 AM   Pulmonary Function Tests   FVC 1.66 liters 1.75 liters 1.79 liters 1.87 liters 1.79 liters 1.53 liters 1.6 liters   FEV1 1.33 liters 1.33 liters 1.39 liters 1.46 liters 1.41 liters 1.25 liters 1.24 liters   TLC (liters) 2.58 liters 3.08 liters 3.1 liters 2.95 liters   2.54 liters   DLCO (ml/mmHg sec)  12.64 ml/mmHg sec 11.98 ml/mmHg sec 12.68 ml/mmHg sec  18.8 ml/mmHg sec 14.6 ml/mmHg sec   FVC% 53.5 56.1 56.9 59 55.8 47 45   FEV1% 55.1 54.8 56.6 59 56.2 49 44   FEF 25-75 1.35 1.29 1.29 1.31 1.41     FEF 25-75% 45.4 61.5 60.6 60 63.7     TLC% 44.7 53.4 53.7 51   43   RV 0.93 1.34 1.32    1.03   RV% 42.5 61.3 60.8    46   DLCO%  49.7 46.9 49  72 71         5/15/2024    12:08 PM 8/31/2023     1:52 PM 6/28/2022     9:36 AM 10/14/2021    10:31 AM 9/17/2020    10:20 AM 7/30/2019    10:48 AM   6MW   6MWT Status  completed without stopping completed with stops completed without stopping completed without stopping completed without stopping   Patient Reported Other (Comment) Dyspnea;Other (Comment) Lightheadedness;Leg pain;Dyspnea Dyspnea;Leg pain;Lightheadedness Dyspnea;Dizziness Lightheadedness;Dyspnea   Was O2 used?  No No No No No   6MW Distance walked (feet) 1250 feet 1300 feet 1100 feet 1300 feet 1200 feet 1277 feet   Distance walked (meters) 381 meters 396.24 meters 335.28 meters 396.24 meters 365.76 meters 389.23 meters   Did patient stop? No No Yes No No No   Oxygen Saturation 98 % 98 % 99 % 99 % 96 % 99 %   Supplemental Oxygen Room Air Room Air Room Air Room Air Room Air Room Air   Heart Rate 80 bpm 64 bpm 61 bpm 62 bpm 74 bpm 81 bpm   Blood Pressure 141/78 126/67 131/60 138/66 128/66 144/67   Tee Dyspnea Rating  moderate somewhat heavy somewhat heavy moderate somewhat  heavy somewhat heavy   Oxygen Saturation 96 % 97 % 100 % 96 % 98 % 90 %   Supplemental Oxygen Room Air Room Air Room Air Room Air Room Air Room Air   Heart Rate 113 bpm 74 bpm 66 bpm 85 bpm 94 bpm 79 bpm   Blood Pressure 131/83 149/66 144/65 158/72 129/59 161/64   Tee Dyspnea Rating  somewhat heavy heavy heavy heavy very heavy very heavy   Recovery Time (seconds) 90 seconds 166 seconds 120 seconds 125 seconds 74 seconds 137 seconds   Oxygen Saturation 98 % 100 % 100 % 99 % 97 % 98 %   Supplemental Oxygen Room Air Room Air Room Air Room Air Room Air Room Air   Heart Rate 91 bpm 73 bpm 63 bpm 72 bpm 69 bpm 68 bpm     TTE 04/18/2022    EXAMINATION:  CT CHEST WITHOUT CONTRAST     CLINICAL HISTORY:  Interstitial lung disease;Interstitial pulmonary disease, unspecified     TECHNIQUE:  Images of the chest extending from the supraclavicular regions to the upper abdomen were performed. Images were acquired without contrast administration. Multiplanar reconstructions were performed and pushed to PACS.     COMPARISON:  None.     FINDINGS:  Chest wall and lower neck: Asymmetric left breast soft tissue inseparable from the pectoralis muscle with a hyperdense structure likely from prior biopsy.     Lungs and pleura: Bilateral fine reticular/ground-glass opacities in the lung apices basilar prominent peripheral reticular and ground-glass confluent opacities with focal areas of cystic changes which may represent minimal honeycombing versus cystic bronchiectasis.     Mediastinum and adán: No mediastinal or hilar adenopathy.     Heart and pericardium: Heart size is normal. No pericardial effusion.     Vessels: No atheromatous disease is seen in the aorta.  The coronary arteries show no atheromatous disease.  Pulmonary trunk of normal size.     Upper abdomen: Left cortical renal cystic lesion measuring 2.7 cm.  Right cortical renal cystic lesion measuring 2.4 cm     Bones: Pectus excavatum with Jose J index of 3.2 (mild to moderate  close.  Mild degenerative changes.     Impression:     1. Bilateral bibasilar greater than bi apical confluent peripheral reticular and ground-glass opacities with minimal cystic changes focal bronchiectasis versus pulmonary confluent cysts.  Findings suspicious for interstitial lung disease NSIP pattern this can be seen as well in chronic aspiration.  2. Mild to moderate pectus excavatum.  3. Left asymmetric breast soft tissue with hyperdense structure likely from prior biopsy.  Correlate with mammography ultrasound.        Electronically signed by:Shakir Moya  Date:                                            10/03/2023  Time:                                           18:55    Assessment:-  1. ILD (interstitial lung disease)    2. Systemic lupus erythematosus with lung involvement, unspecified SLE type    3. History of breast cancer        Plan:      Lung reserve appears to be stable compared to prior studies. Good walk distance without desaturation. Overall clinically doing well with good respiratory status. She remains early for consideration for lung transplant workup. Current barrier for lung transplant is her < 5 year disease free period from breast cancer free interval. Previous TTE without evidence of PH. Recommend repeat TTE at [facility].     2.    Doing well on plaquenil with rituxan every 6 months at [facility]. Follow up with rheumatologist as previously scheduled.     3.     Breast cancer ( L Breast DCIS grade III, ER -/[place]-) s/p L breast segmental mastectomy and lymphectomy. States that she was diagnosed in 2021. Will need reconstructive surgery.     4.   Overall stable respiratory status. OFEV was discussed but she would like to defer at this time.      5.    Encouraged to remain active and weight loss through diet and exercise    6.  RTC in 6 months with PFTs and 6MWT. ARISCAT score is 41 points - intermediate (13.3%) risk of in-hospital post-op pulmonary complications.       Chelsi Sullivan,  PA-C  Advanced Lung Disease

## 2024-11-12 DIAGNOSIS — J84.9 ILD (INTERSTITIAL LUNG DISEASE): Primary | ICD-10-CM

## 2024-11-13 NOTE — PROCEDURES
Katy Vega is a 64 y.o.   female patient, who presents for a 6 minute walk test ordered by DO Jen.  The diagnosis is Interstitial Lung Disease.  The patient's BMI is 34.8 kg/m2.  Predicted distance (lower limit of normal) is 293.56 meters.      Test Results:    The test was completed without stopping.  The total time walked was 360 seconds.  During walking, the patient reported:  Dizziness, Dyspnea, Other (Comment) (right ankle pain). The patient used    during testing.     11/13/2024---------Distance: 415.14 meters (1362 feet)    Lap Walk Time  sec O2 Sat % Supplemental Oxygen  lpm Heart Rate  bpm Blood Pressure  mmHg Tee Scale   Pre-exercise  (Resting) 0  100 ra 50 142/92 4   During Exercise 1 50 99 ra 89     During Exercise 2 98 93 ra 91     During Exercise 3 151 96 ra 100     During Exercise 4 203 97 ra 107       During Exercise 5 256 95 ra 108     During Exercise 6 308 96 ra 109     End of Exercise  360 98 ra 105 149/67 5/6   Post-exercise  (Recovery)   100 ra 63         Recovery Time: 121 seconds    Performing nurse/tech: PAOLA Lopez      PREVIOUS STUDY:   The patient had a previous study.  05/15/2024---------Distance: 381.3 meters (1250 feet)       Lap Walk Time O2 Sat % Supplemental Oxygen Heart Rate Blood Pressure Tee Scale   Pre-exercise  (Resting) 0 0 98 % Room Air 80 bpm 141/78 mmHg 3   During Exercise 1 49 sec 95 % Room Air 109 bpm       During Exercise 2 104 sec 96 % Room Air 108 bpm       During Exercise 3 157 sec 98 % Room Air 107 bpm       During Exercise 4 215 sec 96 % Room Air 113 bpm       During Exercise 5 284 sec 97 % Room Air 112 bpm       During Exercise 6 345 sec 96 % Room Air 107 bpm       End of Exercise   360 sec 96 % Room Air 113 bpm 131/83 mmHg 4   Post-exercise  (Recovery)     98 % Room Air  91 bpm            CLINICAL INTERPRETATION:  Six minute walk distance is 415.14 meters (1362 feet) with heavy dyspnea.  During exercise, there was significant desaturation while  breathing room air.  Both blood pressure and heart rate remained stable with walking.  The patient reported non-pulmonary symptoms during exercise.  The patient did complete the study, walking 360 seconds of the 360 second test.  Since the previous study in 5/2024, exercise capacity is unchanged.  Based upon age and body mass index, exercise capacity is normal.

## 2024-11-20 ENCOUNTER — TELEPHONE (OUTPATIENT)
Dept: HEMATOLOGY/ONCOLOGY | Facility: CLINIC | Age: 64
End: 2024-11-20
Payer: MEDICARE

## 2024-11-25 ENCOUNTER — TELEPHONE (OUTPATIENT)
Dept: HEMATOLOGY/ONCOLOGY | Facility: CLINIC | Age: 64
End: 2024-11-25
Payer: MEDICARE

## 2024-11-25 NOTE — TELEPHONE ENCOUNTER
Called and spoke to patient to confirm appointment in office on 11/27/24 and she is bringing the paper questionnaire

## 2024-11-27 ENCOUNTER — OFFICE VISIT (OUTPATIENT)
Dept: HEMATOLOGY/ONCOLOGY | Facility: CLINIC | Age: 64
End: 2024-11-27
Payer: MEDICARE

## 2024-11-27 ENCOUNTER — LAB VISIT (OUTPATIENT)
Dept: LAB | Facility: HOSPITAL | Age: 64
End: 2024-11-27
Payer: MEDICARE

## 2024-11-27 DIAGNOSIS — Z80.3 FAMILY HISTORY OF BREAST CANCER: ICD-10-CM

## 2024-11-27 DIAGNOSIS — Z85.3 PERSONAL HISTORY OF BREAST CANCER: ICD-10-CM

## 2024-11-27 DIAGNOSIS — Z71.83 ENCOUNTER FOR NONPROCREATIVE GENETIC COUNSELING: Primary | ICD-10-CM

## 2024-11-27 LAB — MISCELLANEOUS GENETIC TEST NAME: NORMAL

## 2024-11-27 PROCEDURE — 99999 PR PBB SHADOW E&M-EST. PATIENT-LVL I: CPT | Mod: PBBFAC,TXP,, | Performed by: PHYSICIAN ASSISTANT

## 2024-11-27 PROCEDURE — 99205 OFFICE O/P NEW HI 60 MIN: CPT | Mod: NTX,S$GLB,, | Performed by: PHYSICIAN ASSISTANT

## 2024-11-27 PROCEDURE — 36415 COLL VENOUS BLD VENIPUNCTURE: CPT | Mod: NTX | Performed by: PHYSICIAN ASSISTANT

## 2024-11-27 PROCEDURE — 3044F HG A1C LEVEL LT 7.0%: CPT | Mod: CPTII,NTX,S$GLB, | Performed by: PHYSICIAN ASSISTANT

## 2024-11-27 NOTE — PROGRESS NOTES
Hereditary/High Risk Clinic  Department of Hematology and Oncology  [facility]    Cancer Genetics  Initial Consultation    Date of Service:  24  Visit Provider:  Anabella Oakes PA-C  Collaborating Physician:  Arlette Beyer MD    Patient:  Katy Vega  :  1960  MRN:   [id number]     Referring Provider    Lilian Ellington MD  [address] [place] [address]    Approximately 35 minutes were spent face-to-face with the patient.  Approximately 60 minutes in total were spent on this encounter, which includes face-to-face time and non-face-to-face time preparing to see the patient (e.g., review of tests), obtaining and/or reviewing separately obtained history, documenting clinical information in the electronic or other health record, independently interpreting results (not separately reported) and communicating results to the patient/family/caregiver, or care coordination (not separately reported).     ASSESSMENT   Based on the information provided, Katy Vega's personal and/or family history is mildly suggestive of a potential hereditary predisposition to cancer and meets current clinical guidelines for genetic testing. Katy was given the option of proceeding with testing now, deferring testing to a later date, or declining testing and opted to proceed.     PLAN     1. Encounter for nonprocreative genetic counseling    2. Personal history of breast cancer  - Ambulatory referral/consult to Genetics  - Genetic Misc Sendout Test, Blood; Future    3. Family history of breast cancer    - Proceed with germline genetic testing as noted below.   - Follow up for results review, post-test genetic counseling.    Genetics lab: [facility]   [id number]    Genetic test: [facility]   Indication/ICD Codes:  PH breast cancer at any age + FH 3 or more breast cancers on the same side of the family (Z85.3, Z80.3)   Specimen type: Blood   Specimen collection by:  [facility]    Specimen collection date: 24   Results expected by: Approximately 3 weeks after the genetic testing lab receives the specimen   Results disclosure plan: Notification by genetics team and post-test visit    Verbal informed consent: Obtained       Questions were encouraged and answered to the patient's satisfaction, and she verbalized understanding of information and agreement with the plan.       SUBJECTIVE   HPI:  Katy Vega is a 64 y.o. assigned female at birth who is new to the [facility] of [facility] and [facility] and to me.  She presents for genetic counseling and cancer risk assessment due to her personal/family history of cancer.     Focused personal history:   Germline cancer-genetic testing: no  Personal history of cancer:    Left breast DCIS, grade III, ER/ND neg s/p segmental mastectomy/ALND (2020, age 59), adjuvant radiation (-2020), reconstruction.   Benign tumors:  no  Blood disorder:  no  Pancreatitis:  no    Cancer Screening:   Colonoscopy: None  Well woman exam: 2024, s/p supracervical hysterectomy   Mammogram: 2024 benign    Family History  Ancestry:   [employer]:  No  Consanguinity:  No  Hereditary cancer genetic testing in blood relatives:  No  Pertinent family history:   Sister: Breast cancer 60s,   Sister: Breast cancer 60s,   Brothers x 2:  from metastatic cancer of uncertain primary at 67 and 75  Niece: Breast cancer 30s,  36  Maternal aunt: Breast cancer,  67  Paternal aunt: Breast cancer,  64  Paternal first-cousin: Breast cancer age?, living at 59        A larger copy of the pedigree is available in Media.       Social History     Tobacco Use    Smoking status: Never     Passive exposure: Past    Smokeless tobacco: Never   Substance Use Topics    Alcohol use: Yes     Comment: very occasional - holiday     Past Medical History:   Diagnosis Date    Aneurysm      Arthritis     Back pain at L4-L5 level     accident    Breast cancer 03/2020    Left breast DCIS    Chronic back pain     COPD (chronic obstructive pulmonary disease)     History of radiation therapy 06/2020    left breast    Hypertension     Lupus     Raynaud disease     Seizures     Stroke 12/2012     Review of Systems  See HPI.      OBJECTIVE   Physical Exam  Very pleasant patient.  Unaccompanied  Vitals signs:  There were no vitals filed for this visit.   Constitutional: No apparent distress.   Pulmonary: Normal effort  Neurological: Alert and oriented. No obvious neurological deficits.   Psychiatric: Normal mood, affect, thought content, speech, behavior, judgment.    COUNSELING     CANCER GENETICS    Cancer is caused by an accumulation of genetic mutations that allow cells to grow and divide uncontrollably to form a tumor. Individuals with certain risk factors are more likely to develop genetic mutations that lead to cancer.      Age: Advancing age is the most important risk factor for cancer overall and for many individual cancer types.    Alcohol: Drinking alcohol can increase your risk of cancer of the mouth, throat, esophagus, larynx, liver, and breast.    Smoking: Tobacco contains benzene and other chemicals and can cause cancer of the lung, larynx, mouth, esophagus, throat, bladder, kidney, liver, stomach, pancreas, colon and rectum, and cervix, as well as acute myeloid leukemia.    Chemicals: Asbestos, benzene, vinyl chloride, pesticides, fertilizer, wood dust, radon, aflatoxin (a food contaminant), arsenic (a drinking water contaminant), etc.  Radiation: Ultraviolet (sun, tanning beds) and ionizing radiation (radiation therapy, xrays, CT scans).  Chronic inflammation: Chronic infections (Jodee-Barr virus, HPV, hepatitis B and C, H. Pylori), abnormal immune reactions, and conditions like obesity and inflammatory bowel disease can cause DNA damage and cancer.   Hormones: Combined hormone therapy  (estrogen and progestin) increases the risk for breast cancer. Hormone therapy with estrogen alone increases the risk for endometrial cancer. Diethylstilbestrol (TIRSO) is a form of estrogen given to some pregnant women from 4988-0544 to prevent premature labor, miscarriages, and other problems. Women who took TIRSO have an increased risk for breast cancers and their daughters have an increased risk for vaginal and cervical cancer.   Medical conditions: Fatty liver disease, cirrhosis, type 2 diabetes, metabolic syndrome (obesity, high blood pressure, high cholesterol, insuline resistance).   Immunosuppression: Due to condition (HIV/AIDS) or taking immunosuppressive medications (after organ transplant, etc).     While most individuals have a family history of cancer, only 5-10% of cancers are hereditary, meaning they are caused by inherited genetic mutations. The remaining cancers are sporadic, meaning they are caused by mutations acquired during the individual's life as a result of aging, environmental exposures, and other causes.     Sporadic cancer (75-80%): Sporadic cancers are random occurrences caused by an accumulation of genetic mutations acquired during the individual's lifetime.   Hereditary cancer (5-10%): Hereditary cancers are caused by a combination of acquired mutations and an inherited mutation in a single gene. A family with a hereditary cancer syndrome will typically have individuals in every generation with the cancers caused by that gene, often diagnosed 10-20 years younger than usual.   Familial cancer (20%): Familial cancer refers to a clustering a cancer in a family that may be more than you would expect to see by chance, but they do not have an inherited mutation in a single gene that caused the cancers. Instead, their cancers are caused by a combination of shared genetic, environmental, and lifestyle factors.      GERMLINE GENETIC TESTING    Purpose:  Analyze specific genes for inherited  mutations.     Results: Positive, negative, and uncertain. A positive result means a cancer-causing mutation was detected. A negative result means no cancer-causing mutations were detected. Uncertain means a genetic change was detected but it is not known if that change results in an increased risk for cancer.     Benefits: When an individual is aware that they have a germline mutation that increases their risk for certain cancers, they can engage in the recommended screening and risk-reduction strategies.     Risks: Genetic discrimination occurs when any entity uses an individual's genetic information to make a decision that negatively impacts them. Examples would include an insurance refusing to issue a policy or an employer refusing to hire someone because they have a germline genetic mutation. The Genetic Information Nondiscrimination Act of 2008 (MARLEN) is a federal law that protects healthcare insurance and some employment. This law does not protect elective insurance such as life insurance, long- and short-term disability insurance, long-term care insurance, and cancer policies.     Cost: Many insurance policies cover genetic testing. With [facility] and [facility], most individuals with insurance pay $0-100 out of pocket for testing with a max of $250. The cash price for testing is $250. Both labs offer financial assistance.     REFERENCES   National Comprehensive Cancer Network (NCCN). (2024). Genetic/familial high-risk assessment: Breast, ovarian, and pancreatic. NCCN Clinical Practice Guidelines in Oncology (NCCN Guidelines), Version 1.2022.      SYLWIA Bowers, PAÁngelC  Physician Assistant, Hereditary & High Risk Clinic  Hematology Oncology, [facility]

## 2025-03-27 ENCOUNTER — TELEPHONE (OUTPATIENT)
Dept: TRANSPLANT | Facility: CLINIC | Age: 65
End: 2025-03-27
Payer: MEDICARE

## 2025-05-09 ENCOUNTER — TELEPHONE (OUTPATIENT)
Dept: TRANSPLANT | Facility: CLINIC | Age: 65
End: 2025-05-09
Payer: MEDICARE

## 2025-05-12 ENCOUNTER — HOSPITAL ENCOUNTER (OUTPATIENT)
Dept: PULMONOLOGY | Facility: CLINIC | Age: 65
Discharge: HOME | End: 2025-05-12
Payer: MEDICARE

## 2025-05-12 ENCOUNTER — OFFICE VISIT (OUTPATIENT)
Dept: TRANSPLANT | Facility: CLINIC | Age: 65
End: 2025-05-12
Payer: MEDICARE

## 2025-05-12 VITALS
OXYGEN SATURATION: 98 % | WEIGHT: 251 LBS | HEART RATE: 58 BPM | HEIGHT: 69 IN | RESPIRATION RATE: 16 BRPM | TEMPERATURE: 98 F | DIASTOLIC BLOOD PRESSURE: 75 MMHG | BODY MASS INDEX: 37.18 KG/M2 | SYSTOLIC BLOOD PRESSURE: 137 MMHG

## 2025-05-12 VITALS — BODY MASS INDEX: 37.18 KG/M2 | WEIGHT: 251 LBS | HEIGHT: 69 IN

## 2025-05-12 DIAGNOSIS — J84.9 ILD (INTERSTITIAL LUNG DISEASE): ICD-10-CM

## 2025-05-12 DIAGNOSIS — M32.13 SYSTEMIC LUPUS ERYTHEMATOSUS WITH LUNG INVOLVEMENT, UNSPECIFIED SLE TYPE: ICD-10-CM

## 2025-05-12 DIAGNOSIS — J84.9 ILD (INTERSTITIAL LUNG DISEASE): Primary | ICD-10-CM

## 2025-05-12 DIAGNOSIS — Z85.3 HISTORY OF BREAST CANCER: ICD-10-CM

## 2025-05-12 LAB
DLCO SINGLE BREATH LLN: 19.53
DLCO SINGLE BREATH PRE REF: 44.8 %
DLCO SINGLE BREATH REF: 25.26
DLCOC SBVA LLN: 3.13
DLCOC SBVA REF: 4.37
DLCOC SINGLE BREATH LLN: 19.53
DLCOC SINGLE BREATH REF: 25.26
DLCOCSBVAULN: 5.62
DLCOCSINGLEBREATHULN: 31
DLCOSINGLEBREATHULN: 31
DLCOSINGLEBREATHZSCORE: -4
DLCOVA LLN: 3.13
DLCOVA PRE REF: 106.6 %
DLCOVA PRE: 4.66 ML/(MIN*MMHG*L) (ref 3.13–5.62)
DLCOVA REF: 4.37
DLCOVAULN: 5.62
ERV LLN: -16449.21
ERV PRE REF: 20.5 %
ERV REF: 0.79
ERVULN: ABNORMAL
FEF 25 75 LLN: 1.54
FEF 25 75 PRE REF: 35.3 %
FEF 25 75 REF: 2.93
FEV05 LLN: 1.17
FEV05 REF: 2.03
FEV1 FVC LLN: 67
FEV1 FVC PRE REF: 95.2 %
FEV1 FVC REF: 79
FEV1 LLN: 1.73
FEV1 PRE REF: 53.5 %
FEV1 REF: 2.41
FEV1FVCZSCORE: -0.54
FEV1ZSCORE: -2.65
FRCPLETH LLN: 2.17
FRCPLETH PREREF: 62.9 %
FRCPLETH REF: 2.99
FRCPLETHULN: 3.81
FVC LLN: 2.24
FVC PRE REF: 55.8 %
FVC REF: 3.08
FVCZSCORE: -2.71
IVC PRE: 1.41 L (ref 2.24–3.97)
IVC SINGLE BREATH LLN: 2.24
IVC SINGLE BREATH PRE REF: 45.6 %
IVC SINGLE BREATH REF: 3.08
IVCSINGLEBREATHULN: 3.97
LLN IC: -16447.44
PEF LLN: 3.84
PEF PRE REF: 42.8 %
PEF REF: 6.17
PHYSICIAN COMMENT: ABNORMAL
PRE DLCO: 11.32 ML/(MIN*MMHG) (ref 19.53–31)
PRE ERV: 0.16 L (ref -16449.21–16450.79)
PRE FEF 25 75: 1.04 L/S (ref 1.54–4.33)
PRE FET 100: 6.04 SEC
PRE FEV05 REF: 48.9 %
PRE FEV1 FVC: 74.89 % (ref 66.59–89.02)
PRE FEV1: 1.29 L (ref 1.73–3.06)
PRE FEV5: 0.99 L (ref 1.17–2.88)
PRE FRC PL: 1.88 L (ref 2.17–3.81)
PRE FVC: 1.72 L (ref 2.24–3.97)
PRE IC: 1.56 L (ref -16447.44–16452.56)
PRE PEF: 2.64 L/S (ref 3.84–8.5)
PRE REF IC: 60.9 %
PRE RV: 1.72 L (ref 1.62–2.77)
PRE TLC: 3.44 L (ref 4.79–6.76)
RAW PRE REF: 210.5 %
RAW PRE: 6.44 CMH2O*S/L (ref 3.06–3.06)
RAW REF: 3.06
REF IC: 2.56
RV LLN: 1.62
RV PRE REF: 78.2 %
RV REF: 2.2
RVTLC LLN: 31
RVTLC PRE REF: 122.7 %
RVTLC PRE: 49.95 % (ref 31.13–50.31)
RVTLC REF: 41
RVTLCULN: 50
RVULN: 2.77
SGAW PRE REF: 66.6 %
SGAW PRE: 0.07 1/(CMH2O*S) (ref 0.1–0.1)
SGAW REF: 0.1
TLC LLN: 4.79
TLC PRE REF: 59.5 %
TLC REF: 5.78
TLC ULN: 6.76
TLCZSCORE: -3.9
ULN IC: ABNORMAL
VA PRE: 2.43 L (ref 5.63–5.63)
VA SINGLE BREATH LLN: 5.63
VA SINGLE BREATH PRE REF: 43.1 %
VA SINGLE BREATH REF: 5.63
VASINGLEBREATHULN: 5.63
VC LLN: 2.24
VC PRE REF: 55.8 %
VC PRE: 1.72 L (ref 2.24–3.97)
VC REF: 3.08
VC ULN: 3.97

## 2025-05-12 PROCEDURE — 3075F SYST BP GE 130 - 139MM HG: CPT | Mod: CPTII,NTX,S$GLB, | Performed by: PHYSICIAN ASSISTANT

## 2025-05-12 PROCEDURE — 3078F DIAST BP <80 MM HG: CPT | Mod: CPTII,NTX,S$GLB, | Performed by: PHYSICIAN ASSISTANT

## 2025-05-12 PROCEDURE — 99999 PR PBB SHADOW E&M-EST. PATIENT-LVL III: CPT | Mod: PBBFAC,TXP,, | Performed by: PHYSICIAN ASSISTANT

## 2025-05-12 PROCEDURE — 3008F BODY MASS INDEX DOCD: CPT | Mod: CPTII,NTX,S$GLB, | Performed by: PHYSICIAN ASSISTANT

## 2025-05-12 PROCEDURE — 1160F RVW MEDS BY RX/DR IN RCRD: CPT | Mod: CPTII,NTX,S$GLB, | Performed by: PHYSICIAN ASSISTANT

## 2025-05-12 PROCEDURE — 1159F MED LIST DOCD IN RCRD: CPT | Mod: CPTII,NTX,S$GLB, | Performed by: PHYSICIAN ASSISTANT

## 2025-05-12 PROCEDURE — 99214 OFFICE O/P EST MOD 30 MIN: CPT | Mod: 25,NTX,S$GLB, | Performed by: PHYSICIAN ASSISTANT

## 2025-05-12 NOTE — LETTER
May 12, 2025        Nishith M. Mewada  [address]  [facility] [address]  Phone: [phone]  Fax: [phone]             [facility] - Transplant 1st Fl  [address]  [facility] [address]  Phone: [phone]   Patient: Katy Vega   MR Number: [id number]   YOB: 1960   Date of Visit: 5/12/2025       Dear Dr. Nishith M. Mewada    Thank you for referring Katy Vega to me for evaluation. Attached you will find relevant portions of my assessment and plan of care.    If you have questions, please do not hesitate to call me. I look forward to following Katy Vega along with you.    Sincerely,    Chelsi Sullivan PA-C    Enclosure    If you would like to receive this communication electronically, please contact [email or web address] or [phone] to request [employer] access.    [employer] is a tool which provides read-only access to select patient information with whom you have a relationship. Its easy to use and provides real time access to review your patients record including encounter summaries, notes, results, and demographic information.    If you feel you have received this communication in error or would no longer like to receive these types of communications, please e-mail [email or web address]

## 2025-05-12 NOTE — PROCEDURES
Katy Vega is a 65 y.o.   female patient, who presents for a 6 minute walk test ordered by DO Jen.  The diagnosis is Interstitial Lung Disease.  The patient's BMI is 37 kg/m2.  Predicted distance (lower limit of normal) is 274 meters.      Test Results:    The test was completed without stopping. The total time walked was 360 seconds.  During walking, the patient reported:  Leg/hip pain, Dyspnea. The patient used no assistive devices  during testing.     05/12/2025---------Distance: 335.28 meters (1100 feet)     O2 Sat % Supplemental Oxygen Heart Rate Blood Pressure Tee Scale   Pre-exercise  (Resting) 93 % Room Air 59 bpm 154/78 mmHg 3   During Exercise 93 % Room Air 95 bpm 174/81 mmHg 7-8   Post-exercise  (Recovery) 98 % Room Air  86 bpm   mmHg       Recovery Time: 161 seconds    Performing nurse/tech: Bayron LOPES      PREVIOUS STUDY:   The patient had a previous study.  11/13/2024---------Distance: 415.14 meters (1362 feet)    Lap Walk Time  sec O2 Sat % Supplemental Oxygen  lpm Heart Rate  bpm Blood Pressure  mmHg Tee Scale   Pre-exercise  (Resting) 0   100 ra 50 142/92 4   During Exercise 1 50 99 ra 89       During Exercise 2 98 93 ra 91       During Exercise 3 151 96 ra 100       During Exercise 4 203 97 ra 107          During Exercise 5 256 95 ra 108       During Exercise 6 308 96 ra 109       End of Exercise   360 98 ra 105 149/67 5/6   Post-exercise  (Recovery)     100 ra 63               CLINICAL INTERPRETATION:  Six minute walk distance is 335.28 meters (1100 feet) with very, very heavy dyspnea.  During exercise, there was no significant desaturation while breathing room air.  Both blood pressure and heart rate remained stable with walking.  The patient reported non-pulmonary symptoms during exercise.  The patient did complete the study, walking 360 seconds of the 360 second test.  Since the previous study in 11/2024, exercise capacity may be somewhat worse.  Based upon age and body mass  index, exercise capacity is normal.

## 2025-05-12 NOTE — PROGRESS NOTES
ADVANCED LUNG DISEASE FOLLOW-UP    Referring Physician: Nishith M. 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 She is on no assisted ventilation.  Her New York Heart Association Class is II and a Karnofsky score of 80% - Normal activity with effort: some symptoms of disease. She is not diabetic.     Patient presents today for routine follow up for MCTD/SLE-ILD.  Overall doing very well since her last visit. No recent exacerbations/hospitalizations. Notes some increased dyspnea since her last appointment. Is awaiting breast reconstruction surgery and is requesting pulmonary clearance. Remains on plaquenil. She was due for rituximab and needs to schedule.     Brief history referring to [facility] from July 2019  Patient reports LEPE for years starting in [id number].  At that time, she also noticed increasing fatigue with daily activities.  She was first evaluated at [facility] in 2006 when she evacuated after [place].  She states at that time she was diagnosed with COPD and ILD.  Further work-up showed positive autoimmune serologies and she was diagnosed with Lupus.  She was started on Plaquenil and Prednisone.  She then had intermittent follow-up until she moved back to [place] in 2012.  At that time, she established with Rheumatology at  and was again started on Plaquenil and Prednisone which she remained on until a few months ago.  She then began following with [facility] at [facility].  CT chest showed evidence of GGOs and fibrosis which have been stable on imaging.  They noticed a decrease in her FVC and referred her to [facility]ology who have  since stopped the Prednisone, continued her on Plaquenil, and added MMF.  She has been unable to tolerate more than 1500mg per day due to GI side effects.  Since starting Prednisone years ago, she noticed an increased weight gain of approximately 80-90 lbs.  She has lost 15lbs since stopping.  Remains with LEPE but attempts to go to the gym and rides the stationary bike.  She does not require supplemental oxygen and does not have evidence of PH.  She currently takes Symbicort and prn Albuterol for her COPD.  Does not have exacerbations or require outpatient steroids/abx.     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  She feels she has negligible mild cognitive impairment and gait abnormality.  She has been seizure free for >7 years. She weaned off anti-epileptics on her own.    - Breast cancer about 3 years ago s/p lympectomy (L) with partial masectomy .       She is currently on workers comp following a fall at work which led to a back injury requiring surgery and pain management.  She previously worked as a .  She is  with her  dying from lung cancer in .  Her 2 brothers and father also  of lung cancer and she has had 2 sisters die of breast cancer.  She is UTD on mammograms and had a right axillary LN biopsy last year which was negative.  She is a never smoker but has extensive second hand exposure.  She does not drink or use illicits.     Review of Systems   Constitutional:  Negative for chills, diaphoresis, fever, malaise/fatigue and weight loss.   HENT:  Negative for congestion, ear discharge, ear pain, hearing loss, nosebleeds, sinus pain, sore throat and tinnitus.    Eyes:  Negative for blurred vision, double vision, photophobia, pain, discharge and redness.   Respiratory:  Negative for cough, hemoptysis, sputum production, shortness of breath, wheezing and stridor.    Cardiovascular:   Negative for chest pain, palpitations, orthopnea, claudication, leg swelling and PND.   Gastrointestinal:  Negative for abdominal pain, blood in stool, constipation, diarrhea, heartburn, melena, nausea and vomiting.   Genitourinary:  Negative for dysuria, flank pain, frequency, hematuria and urgency.   Musculoskeletal:  Negative for back pain, falls, joint pain, myalgias and neck pain.   Skin:  Negative for itching and rash.   Neurological:  Negative for dizziness, tingling, tremors, sensory change, speech change, focal weakness, seizures, loss of consciousness, weakness and headaches.   Endo/Heme/Allergies:  Negative for environmental allergies and polydipsia. Does not bruise/bleed easily.   Psychiatric/Behavioral:  Negative for depression, hallucinations, memory loss, substance abuse and suicidal ideas. The patient is not nervous/anxious and does not have insomnia.      Objective:   LMP  (LMP Unknown)   Physical Exam  Constitutional:       Appearance: Normal appearance. She is well-developed.   HENT:      Head: Normocephalic and atraumatic.   Eyes:      Conjunctiva/sclera: Conjunctivae normal.   Cardiovascular:      Rate and Rhythm: Normal rate and regular rhythm.      Heart sounds: Normal heart sounds.   Pulmonary:      Effort: Pulmonary effort is normal.      Breath sounds: Normal breath sounds.   Abdominal:      General: Bowel sounds are normal.      Palpations: Abdomen is soft.   Musculoskeletal:         General: Normal range of motion.      Cervical back: Normal range of motion and neck supple.   Skin:     General: Skin is warm and dry.   Neurological:      Mental Status: She is alert and oriented to person, place, and time.   Psychiatric:         Thought Content: Thought content normal.       Labs:    Lab Visit on 09/14/2020   Component Date Value    SARS-CoV2 (COVID-19) [place]* 09/14/2020 Not Detected            5/12/2025     1:37 PM 11/11/2024     1:37 PM 5/15/2024     1:34 PM 8/31/2023     2:14 PM 6/28/2022     10:00 AM 10/14/2021    11:00 AM 9/17/2020    11:00 AM   Pulmonary Function Tests   FVC 1.72 liters 1.66 liters 1.75 liters 1.79 liters 1.87 liters 1.79 liters 1.53 liters   FEV1 1.29 liters 1.33 liters 1.33 liters 1.39 liters 1.46 liters 1.41 liters 1.25 liters   TLC (liters) 3.44 liters 2.58 liters 3.08 liters 3.1 liters 2.95 liters     DLCO (ml/mmHg sec) 11.32 ml/mmHg sec  12.64 ml/mmHg sec 11.98 ml/mmHg sec 12.68 ml/mmHg sec  18.8 ml/mmHg sec   FVC% 55.8 53.5 56.1 56.9 59 55.8 47   FEV1% 53.5 55.1 54.8 56.6 59 56.2 49   FEF 25-75 1.04 1.35 1.29 1.29 1.31 1.41    FEF 25-75% 35.3 45.4 61.5 60.6 60 63.7    TLC% 59.5 44.7 53.4 53.7 51     RV 1.72 0.93 1.34 1.32      RV% 78.2 42.5 61.3 60.8      DLCO% 44.8  49.7 46.9 49  72         5/12/2025    12:54 PM 11/11/2024     1:43 PM 5/15/2024    12:08 PM 8/31/2023     1:52 PM 6/28/2022     9:36 AM 10/14/2021    10:31 AM 9/17/2020    10:20 AM   6MW   6MWT Status completed without stopping completed without stopping  completed without stopping completed with stops completed without stopping completed without stopping   Patient Reported Leg/hip pain;Dyspnea Dizziness;Dyspnea;Other (Comment) Other (Comment)  Dyspnea;Other (Comment)  Lightheadedness;Leg pain;Dyspnea  Dyspnea;Leg pain;Lightheadedness  Dyspnea;Dizziness    Was O2 used? No No  No No No No   6MW Distance walked (feet) 1100 feet 1362 feet 1250 feet 1300 feet 1100 feet 1300 feet 1200 feet   Distance walked (meters) 335.28 meters 415.14 meters 381 meters 396.24 meters 335.28 meters 396.24 meters 365.76 meters   Did patient stop? No No No No Yes No No   Oxygen Saturation 93 % 100 % 98 % 98 % 99 % 99 % 96 %   Supplemental Oxygen Room Air Room Air Room Air  Room Air  Room Air  Room Air  Room Air    Heart Rate 59 bpm 50 bpm 80 bpm 64 bpm 61 bpm 62 bpm 74 bpm   Blood Pressure 154/78 142/92 141/78 126/67 131/60 138/66 128/66   Tee Dyspnea Rating  moderate somewhat heavy moderate somewhat heavy somewhat heavy moderate  somewhat heavy   Oxygen Saturation 93 % 98 % 96 % 97 % 100 % 96 % 98 %   Supplemental Oxygen Room Air Room Air Room Air  Room Air  Room Air  Room Air  Room Air    Heart Rate 95 bpm 105 bpm 113 bpm 74 bpm 66 bpm 85 bpm 94 bpm   Blood Pressure 174/81 149/67 131/83 149/66 144/65 158/72 129/59   Tee Dyspnea Rating  very heavy heavy somewhat heavy heavy heavy heavy very heavy   Recovery Time (seconds) 161 seconds 121 seconds 90 seconds 166 seconds 120 seconds 125 seconds 74 seconds   Oxygen Saturation 98 % 100 % 98 % 100 % 100 % 99 % 97 %   Supplemental Oxygen Room Air Room Air Room Air  Room Air  Room Air  Room Air  Room Air    Heart Rate 86 bpm 63 bpm 91 bpm 73 bpm 63 bpm 72 bpm 69 bpm       Data saved with a previous flowsheet row definition     TTE 04/18/2022    EXAMINATION:  CT CHEST WITHOUT CONTRAST     CLINICAL HISTORY:  Interstitial lung disease;Interstitial pulmonary disease, unspecified     TECHNIQUE:  Images of the chest extending from the supraclavicular regions to the upper abdomen were performed. Images were acquired without contrast administration. Multiplanar reconstructions were performed and pushed to PACS.     COMPARISON:  None.     FINDINGS:  Chest wall and lower neck: Asymmetric left breast soft tissue inseparable from the pectoralis muscle with a hyperdense structure likely from prior biopsy.     Lungs and pleura: Bilateral fine reticular/ground-glass opacities in the lung apices basilar prominent peripheral reticular and ground-glass confluent opacities with focal areas of cystic changes which may represent minimal honeycombing versus cystic bronchiectasis.     Mediastinum and adán: No mediastinal or hilar adenopathy.     Heart and pericardium: Heart size is normal. No pericardial effusion.     Vessels: No atheromatous disease is seen in the aorta.  The coronary arteries show no atheromatous disease.  Pulmonary trunk of normal size.     Upper abdomen: Left cortical renal cystic lesion measuring  2.7 cm.  Right cortical renal cystic lesion measuring 2.4 cm     Bones: Pectus excavatum with Jose J index of 3.2 (mild to moderate close.  Mild degenerative changes.     Impression:     1. Bilateral bibasilar greater than bi apical confluent peripheral reticular and ground-glass opacities with minimal cystic changes focal bronchiectasis versus pulmonary confluent cysts.  Findings suspicious for interstitial lung disease NSIP pattern this can be seen as well in chronic aspiration.  2. Mild to moderate pectus excavatum.  3. Left asymmetric breast soft tissue with hyperdense structure likely from prior biopsy.  Correlate with mammography ultrasound.        Electronically signed by:Shakir Moya  Date:                                            10/03/2023  Time:                                           18:55    Assessment:-  1. ILD (interstitial lung disease)    2. Systemic lupus erythematosus with lung involvement, unspecified SLE type    3. History of breast cancer          Plan:      FVC/DLCO stable. No exertional hypoxemia. She remains early for consideration for lung transplant workup. Current barrier for lung transplant is her < 5 year disease free period from breast cancer free interval and BMI. Previous TTE without evidence of PH. Recommend repeat TTE.    2.    Doing well on plaquenil with rituxan every 6 months at [facility]. Follow up with rheumatologist as previously scheduled.     3.     Breast cancer ( L Breast DCIS grade III, ER -/[place]-) s/p L breast segmental mastectomy and lymphectomy. States that she was diagnosed in 2021. Will need reconstructive surgery.     4.   Overall stable respiratory status. OFEV was discussed but she would like to defer at this time.      5.    Encouraged to remain active and weight loss through diet and exercise    6.  RTC in 6 months with PFTs and 6MWT. ARISCAT score is 41 points - intermediate (13.3%) risk of in-hospital post-op pulmonary complications. Her PFTs have  remained stable since 2020 and does not have oxygen requirements.         Chelsi Sullivan PA-C  Advanced Lung Disease

## 2025-05-30 ENCOUNTER — HOSPITAL ENCOUNTER (OUTPATIENT)
Dept: CARDIOLOGY | Facility: HOSPITAL | Age: 65
Discharge: HOME | End: 2025-05-30
Attending: PHYSICIAN ASSISTANT
Payer: MEDICARE

## 2025-05-30 VITALS
DIASTOLIC BLOOD PRESSURE: 75 MMHG | HEIGHT: 69 IN | WEIGHT: 251 LBS | BODY MASS INDEX: 37.18 KG/M2 | HEART RATE: 70 BPM | SYSTOLIC BLOOD PRESSURE: 137 MMHG

## 2025-05-30 DIAGNOSIS — J84.9 ILD (INTERSTITIAL LUNG DISEASE): ICD-10-CM

## 2025-05-30 LAB
AORTIC SIZE INDEX (SOV): 1.4 CM/M2
AORTIC SIZE INDEX: 1.4 CM/M2
ASCENDING AORTA: 3.2 CM
AV AREA BY CONTINUOUS VTI: 3 CM2
AV INDEX (PROSTH): 0.88
AV LVOT MEAN GRADIENT: 4 MMHG
AV LVOT PEAK GRADIENT: 7 MMHG
AV MEAN GRADIENT: 5 MMHG
AV PEAK GRADIENT: 9 MMHG
AV VALVE AREA BY VELOCITY RATIO: 3 CM²
AV VALVE AREA: 3 CM2
AV VELOCITY RATIO: 0.87
BSA FOR ECHO PROCEDURE: 2.35 M2
CV ECHO LV RWT: 0.43 CM
DOP CALC AO PEAK VEL: 1.5 M/S
DOP CALC AO VTI: 31.7 CM
DOP CALC LVOT AREA: 3.5 CM2
DOP CALC LVOT DIAMETER: 2.1 CM
DOP CALC LVOT PEAK VEL: 1.3 M/S
DOP CALC LVOT STROKE VOLUME: 96.6 CM3
DOP CALCLVOT PEAK VEL VTI: 27.9 CM
E WAVE DECELERATION TIME: 161 MS
E/A RATIO: 1.45
E/E' RATIO: 9 M/S
ECHO EF ESTIMATED: 56 %
ECHO LV POSTERIOR WALL: 1 CM (ref 0.6–1.1)
FRACTIONAL SHORTENING: 29.8 % (ref 28–44)
INTERVENTRICULAR SEPTUM: 1 CM (ref 0.6–1.1)
IVC DIAMETER: 1.96 CM
IVRT: 91 MS
LA MAJOR: 6 CM
LA MINOR: 6 CM
LA WIDTH: 4.6 CM
LEFT ATRIUM SIZE: 3.7 CM
LEFT ATRIUM VOLUME INDEX MOD: 33 ML/M2
LEFT ATRIUM VOLUME INDEX: 38 ML/M2
LEFT ATRIUM VOLUME MOD: 75 ML
LEFT ATRIUM VOLUME: 87 CM3
LEFT INTERNAL DIMENSION IN SYSTOLE: 3.3 CM (ref 2.1–4)
LEFT VENTRICLE DIASTOLIC VOLUME INDEX: 44.49 ML/M2
LEFT VENTRICLE DIASTOLIC VOLUME: 101 ML
LEFT VENTRICLE MASS INDEX: 72.4 G/M2
LEFT VENTRICLE SYSTOLIC VOLUME INDEX: 19.8 ML/M2
LEFT VENTRICLE SYSTOLIC VOLUME: 45 ML
LEFT VENTRICULAR INTERNAL DIMENSION IN DIASTOLE: 4.7 CM (ref 3.5–6)
LEFT VENTRICULAR MASS: 164.5 G
LV LATERAL E/E' RATIO: 7.4
LV SEPTAL E/E' RATIO: 12.3
MV A" WAVE DURATION": 201.71 MS
MV PEAK A VEL: 0.51 M/S
MV PEAK E VEL: 0.74 M/S
OHS CV RV/LV RATIO: 0.57 CM
PISA TR MAX VEL: 2.8 M/S
PULM VEIN A" WAVE DURATION": 201.71 MS
PULM VEIN S/D RATIO: 1.05
PULMONIC VEIN PEAK A VELOCITY: 0.2 M/S
PV PEAK D VEL: 0.37 M/S
PV PEAK S VEL: 0.39 M/S
RA MAJOR: 5.73 CM
RA PRESSURE ESTIMATED: 3 MMHG
RA WIDTH: 3.24 CM
RIGHT ATRIAL AREA: 16.4 CM2
RIGHT VENTRICLE DIASTOLIC BASEL DIMENSION: 2.7 CM
RV TB RVSP: 6 MMHG
RV TISSUE DOPPLER FREE WALL SYSTOLIC VELOCITY 1 (APICAL 4 CHAMBER VIEW): 11.62 CM/S
SINUS: 3.24 CM
STJ: 2.9 CM
TDI LATERAL: 0.1 M/S
TDI SEPTAL: 0.06 M/S
TDI: 0.08 M/S
TRICUSPID ANNULAR PLANE SYSTOLIC EXCURSION: 1.6 CM
TV PEAK GRADIENT: 30 MMHG
TV REST PULMONARY ARTERY PRESSURE: 34 MMHG
Z-SCORE OF LEFT VENTRICULAR DIMENSION IN END DIASTOLE: -5.85
Z-SCORE OF LEFT VENTRICULAR DIMENSION IN END SYSTOLE: -3.45

## 2025-05-30 PROCEDURE — 93306 TTE W/DOPPLER COMPLETE: CPT | Mod: 26,NTX,, | Performed by: INTERNAL MEDICINE

## 2025-05-30 PROCEDURE — 93306 TTE W/DOPPLER COMPLETE: CPT | Mod: TXP

## 2025-08-22 ENCOUNTER — TELEPHONE (OUTPATIENT)
Dept: TRANSPLANT | Facility: CLINIC | Age: 65
End: 2025-08-22
Payer: MEDICARE